# Patient Record
Sex: FEMALE | Race: WHITE | NOT HISPANIC OR LATINO | URBAN - METROPOLITAN AREA
[De-identification: names, ages, dates, MRNs, and addresses within clinical notes are randomized per-mention and may not be internally consistent; named-entity substitution may affect disease eponyms.]

---

## 2017-01-09 ENCOUNTER — APPOINTMENT (OUTPATIENT)
Dept: URBAN - METROPOLITAN AREA CLINIC 200 | Age: 52
Setting detail: DERMATOLOGY
End: 2017-01-14

## 2017-01-09 DIAGNOSIS — L21.8 OTHER SEBORRHEIC DERMATITIS: ICD-10-CM

## 2017-01-09 PROBLEM — D23.5 OTHER BENIGN NEOPLASM OF SKIN OF TRUNK: Status: ACTIVE | Noted: 2017-01-09

## 2017-01-09 PROCEDURE — 99213 OFFICE O/P EST LOW 20 MIN: CPT

## 2017-01-09 PROCEDURE — OTHER PRESCRIPTION: OTHER

## 2017-01-09 PROCEDURE — OTHER COUNSELING: OTHER

## 2017-01-09 RX ORDER — KETOCONAZOLE 20 MG/G
CREAM TOPICAL
Qty: 1 | Refills: 4 | Status: ERX | COMMUNITY
Start: 2017-01-09

## 2017-01-09 ASSESSMENT — LOCATION DETAILED DESCRIPTION DERM: LOCATION DETAILED: RIGHT CENTRAL FRONTAL SCALP

## 2017-01-09 ASSESSMENT — LOCATION SIMPLE DESCRIPTION DERM: LOCATION SIMPLE: RIGHT SCALP

## 2017-01-09 ASSESSMENT — LOCATION ZONE DERM: LOCATION ZONE: SCALP

## 2018-03-05 ENCOUNTER — TRANSCRIBE ORDERS (OUTPATIENT)
Dept: SCHEDULING | Age: 53
End: 2018-03-05

## 2018-03-05 DIAGNOSIS — D25.9 UTERINE LEIOMYOMA, UNSPECIFIED LOCATION: Primary | ICD-10-CM

## 2018-03-09 ENCOUNTER — HOSPITAL ENCOUNTER (OUTPATIENT)
Dept: RADIOLOGY | Facility: HOSPITAL | Age: 53
Discharge: HOME | End: 2018-03-09
Attending: OBSTETRICS & GYNECOLOGY
Payer: COMMERCIAL

## 2018-03-09 DIAGNOSIS — D25.9 UTERINE LEIOMYOMA, UNSPECIFIED LOCATION: ICD-10-CM

## 2018-03-09 PROCEDURE — 76856 US EXAM PELVIC COMPLETE: CPT

## 2018-03-22 RX ORDER — VENLAFAXINE HYDROCHLORIDE 150 MG/1
1 CAPSULE, EXTENDED RELEASE ORAL DAILY
COMMUNITY
End: 2018-03-26 | Stop reason: DRUGHIGH

## 2018-03-22 RX ORDER — BUDESONIDE AND FORMOTEROL FUMARATE DIHYDRATE 160; 4.5 UG/1; UG/1
2 AEROSOL RESPIRATORY (INHALATION) 2 TIMES DAILY
COMMUNITY
End: 2018-03-26 | Stop reason: DRUGHIGH

## 2018-03-22 RX ORDER — MONTELUKAST SODIUM 10 MG/1
1 TABLET ORAL DAILY
COMMUNITY
End: 2018-12-19 | Stop reason: SDUPTHER

## 2018-03-26 ENCOUNTER — OFFICE VISIT (OUTPATIENT)
Dept: GYNECOLOGIC ONCOLOGY | Facility: CLINIC | Age: 53
End: 2018-03-26
Attending: OBSTETRICS & GYNECOLOGY
Payer: COMMERCIAL

## 2018-03-26 VITALS
HEIGHT: 65 IN | DIASTOLIC BLOOD PRESSURE: 92 MMHG | WEIGHT: 162 LBS | SYSTOLIC BLOOD PRESSURE: 143 MMHG | BODY MASS INDEX: 26.99 KG/M2 | HEART RATE: 105 BPM

## 2018-03-26 DIAGNOSIS — D25.2 SUBMUCOUS AND SUBSEROUS LEIOMYOMA OF UTERUS: Primary | ICD-10-CM

## 2018-03-26 DIAGNOSIS — D25.0 SUBMUCOUS AND SUBSEROUS LEIOMYOMA OF UTERUS: Primary | ICD-10-CM

## 2018-03-26 PROBLEM — D25.1 INTRAMURAL AND SUBMUCOUS LEIOMYOMA OF UTERUS: Status: ACTIVE | Noted: 2018-03-26

## 2018-03-26 PROCEDURE — 99204 OFFICE O/P NEW MOD 45 MIN: CPT | Performed by: OBSTETRICS & GYNECOLOGY

## 2018-03-26 RX ORDER — SERTRALINE HYDROCHLORIDE 50 MG/1
25 TABLET, FILM COATED ORAL DAILY
Refills: 2 | COMMUNITY
Start: 2018-01-26 | End: 2019-02-10 | Stop reason: SDUPTHER

## 2018-03-26 RX ORDER — DILTIAZEM HYDROCHLORIDE 60 MG/1
1 TABLET, FILM COATED ORAL DAILY
Refills: 5 | COMMUNITY
Start: 2017-12-29 | End: 2019-01-15 | Stop reason: SDUPTHER

## 2018-03-26 RX ORDER — ALBUTEROL SULFATE 90 UG/1
2 AEROSOL, METERED RESPIRATORY (INHALATION) 4 TIMES DAILY PRN
Refills: 2 | COMMUNITY
Start: 2018-01-24 | End: 2019-09-05 | Stop reason: SDUPTHER

## 2018-03-26 ASSESSMENT — ENCOUNTER SYMPTOMS
NEUROLOGICAL NEGATIVE: 1
EYES NEGATIVE: 1
ALLERGIC/IMMUNOLOGIC COMMENTS: REVIEWED AND UPDATED
PSYCHIATRIC NEGATIVE: 1
RESPIRATORY NEGATIVE: 1
MUSCULOSKELETAL NEGATIVE: 1
CONSTITUTIONAL NEGATIVE: 1
ENDOCRINE NEGATIVE: 1
GASTROINTESTINAL NEGATIVE: 1
CARDIOVASCULAR NEGATIVE: 1

## 2018-03-26 ASSESSMENT — PAIN SCALES - GENERAL: PAINLEVEL: 0-NO PAIN

## 2018-03-26 NOTE — PROGRESS NOTES
3/26/2018    Madonna Tipton is a 52 y.o. female who presents with uterine fibroids. Periods are regular every 21-24 days, lasting 3 days. Dysmenorrhea:mild, occurring first 1-2 days of flow. Cyclic symptoms include bloating and pelvic pain. No intermenstrual bleeding, spotting, or discharge.    Current contraception: Infertility, tubal factor  History of abnormal Pap smear: no  Family history of uterine or ovarian cancer: no  Regular self breast exam: not applicable  History of abnormal mammogram: no  Family history of breast cancer: yes - Mother age 56 yo Mammogram normal May 2018  History of abnormal lipids: no  Menstrual History:  OB History      Para Term  AB Living    2 1     1 1    SAB TAB Ectopic Multiple Live Births                      Menarche age: 14  No LMP recorded. Patient is not currently having periods (Reason: Premenopausal).  Period Cycle (Days): 21  Period Duration (Days): 3  Period Pattern: Regular  Menstrual Flow: Moderate  ULTRASOUND PELVIS W TRANSVAGINAL   Status: Final result   Study Result     CLINICAL HISTORY:  History of fibroids; LMP three weeks ago.     COMMENT: Transabdominal and transvaginal ultrasound of the pelvis is performed.  Transvaginal examination was performed to better evaluate the endometrium and  the ovaries.  There are no prior examinations for comparison.  Transabdominally,  the urinary bladder is normal.  Uterus measures 13.7 cm in length x 5.1 cm in AP  x 8.6 cm in transverse dimension.  The endometrial stripe is homogeneous  measuring 0.9 cm in thickness.  There is no abnormal vascular flow within the  endometrium.  Multiple fibroids are identified.  The first fibroid is seen in  the right aspect of the mid-uterus measuring 5.3 cm x 5.8 cm x 6.2 cm.  Second  fibroid is seen in the left lower uterine segment anteriorly measuring 1.9 cm x  1.7 cm x 2.2 cm.  Third fibroid is seen posterior right aspect of the fundus  measuring 8.6 cm x 6.1 cm x 7.2 cm.   "Right ovary is not seen.  Left ovary  measures 3.4 cm x 4.7 cm x 4.1 cm.  A cyst is visualized within it measuring 3.4  cm x 2.3 cm x 2.8 cm.  No free fluid is noted.     --  IMPRESSION:  1.  Fibroid uterus with normal-appearing endometrial stripe.  2.  Nonvisualization of the right ovary with normal-appearing left ovary.   Result History     ULTRASOUND PELVIS W TRANSVAGINAL (Order #[id number]) on 3/9/2018 - Order Result History Report - Result Edited   Percentile Graphs     No pregnancy episode available   Signed by     Reading Radiologist Date/Time    Tomy Duran MD 3/09/2018 14:55       The following portions of the patient's history were reviewed and updated as appropriate: allergies, current medications, past family history, past medical history, past social history, past surgical history and problem list.    BP (!) 143/92   Pulse (!) 105   Ht 1.651 m (5' 5\")   Wt 73.5 kg (162 lb)   BMI 26.96 kg/m²   HPI  Review of Systems   Constitutional: Negative.    HENT: Negative.    Eyes: Negative.    Respiratory: Negative.    Cardiovascular: Negative.    Gastrointestinal: Negative.    Endocrine: Negative.    Genitourinary:        As per HPI   Musculoskeletal: Negative.    Skin: Negative.    Allergic/Immunologic:        Reviewed and updated   Neurological: Negative.    Psychiatric/Behavioral: Negative.      Physical Exam   Constitutional: She is oriented to person, place, and time. She appears well-developed and well-nourished.   Genitourinary: Vagina normal.   External female genitalia normal.   Urethral meatus normal.   Urethra normal.   Vagina normal.   Cervix exam normal.Uterus is enlarged (18 week size and irregular with low anterior fibroid.). Uterine contour is irregular.   Adnexa normal.   Cardiovascular: Normal rate.    Pulmonary/Chest: Effort normal.   Abdominal: Soft. She exhibits distension. There is no tenderness. There is no guarding.   Neurological: She is alert and oriented to person, place, and time. "   Skin: Skin is warm and dry.   Vitals reviewed.      Assessment/Plan   Symptomatic uterine fibroids.  Abdominal ultrasound..  Return if symptoms worsen or fail to improve, for Prep for Surgery >7 days REquires EMBx.  Ciro Monroe MD

## 2018-03-26 NOTE — PATIENT INSTRUCTIONS
Patient Education   Total Laparoscopic Hysterectomy  A total laparoscopic hysterectomy is a minimally invasive surgery to remove your uterus and cervix. This surgery is performed by making several small cuts (incisions) in your abdomen. It can also be done with a thin, lighted tube (laparoscope) inserted into two small incisions in your lower abdomen. Your fallopian tubes and ovaries can be removed (bilateral salpingo-oophorectomy) during this surgery as well. Benefits of minimally invasive surgery include:  · Less pain.  · Less risk of blood loss.  · Less risk of infection.  · Quicker return to normal activities.  Tell a health care provider about:  · Any allergies you have.  · All medicines you are taking, including vitamins, herbs, eye drops, creams, and over-the-counter medicines.  · Any problems you or family members have had with anesthetic medicines.  · Any blood disorders you have.  · Any surgeries you have had.  · Any medical conditions you have.  What are the risks?  Generally, this is a safe procedure. However, as with any procedure, complications can occur. Possible complications include:  · Bleeding.  · Blood clots in the legs or lung.  · Infection.  · Injury to surrounding organs.  · Problems with anesthesia.  · Early menopause symptoms (hot flashes, night sweats, insomnia).  · Risk of conversion to an open abdominal incision.  What happens before the procedure?  · Ask your health care provider about changing or stopping your regular medicines.  · Do not take aspirin or blood thinners (anticoagulants) for 1 week before the surgery or as told by your health care provider.  · Do not eat or drink anything for 8 hours before the surgery or as told by your health care provider.  · Quit smoking if you smoke.  · Arrange for a ride home after surgery and for someone to help you at home during recovery.  What happens during the procedure?  · You will be given antibiotic medicine.  · An IV tube will be placed in  your arm. You will be given medicine to make you sleep (general anesthetic).  · A gas (carbon dioxide) will be used to inflate your abdomen. This will allow your surgeon to look inside your abdomen, perform your surgery, and treat any other problems found if necessary.  · Three or four small incisions (often less than 1/2 inch) will be made in your abdomen. One of these incisions will be made in the area of your belly button (navel). The laparoscope will be inserted into the incision. Your surgeon will look through the laparoscope while doing your procedure.  · Other surgical instruments will be inserted through the other incisions.  · Your uterus may be removed through your vagina or cut into small pieces and removed through the small incisions.  · Your incisions will be closed.  What happens after the procedure?  · The gas will be released from inside your abdomen.  · You will be taken to the recovery area where a nurse will watch and check your progress. Once you are awake, stable, and taking fluids well, without other problems, you will return to your room or be allowed to go home.  · There is usually minimal discomfort following the surgery because the incisions are so small.  · You will be given pain medicine while you are in the hospital and for when you go home.  This information is not intended to replace advice given to you by your health care provider. Make sure you discuss any questions you have with your health care provider.  Document Released: 10/14/2008 Document Revised: 05/25/2017 Document Reviewed: 07/08/2014  [employer] Interactive Patient Education © 2017 [employer]

## 2018-04-02 PROBLEM — D25.2 SUBMUCOUS AND SUBSEROUS LEIOMYOMA OF UTERUS: Status: ACTIVE | Noted: 2018-04-02

## 2018-04-02 PROBLEM — D25.0 SUBMUCOUS AND SUBSEROUS LEIOMYOMA OF UTERUS: Status: ACTIVE | Noted: 2018-04-02

## 2018-05-07 ENCOUNTER — HOSPITAL ENCOUNTER (OUTPATIENT)
Dept: RADIOLOGY | Age: 53
Discharge: HOME | End: 2018-05-07
Attending: OBSTETRICS & GYNECOLOGY
Payer: COMMERCIAL

## 2018-05-07 DIAGNOSIS — Z12.31 ENCOUNTER FOR SCREENING MAMMOGRAM FOR MALIGNANT NEOPLASM OF BREAST: ICD-10-CM

## 2018-05-07 PROCEDURE — 77067 SCR MAMMO BI INCL CAD: CPT

## 2018-05-21 ENCOUNTER — OFFICE VISIT (OUTPATIENT)
Dept: SURGERY | Facility: CLINIC | Age: 53
End: 2018-05-21
Attending: SURGERY
Payer: COMMERCIAL

## 2018-05-21 VITALS
WEIGHT: 166.2 LBS | SYSTOLIC BLOOD PRESSURE: 126 MMHG | BODY MASS INDEX: 27.66 KG/M2 | TEMPERATURE: 98.4 F | DIASTOLIC BLOOD PRESSURE: 76 MMHG

## 2018-05-21 DIAGNOSIS — Z91.89 AT HIGH RISK FOR BREAST CANCER: Primary | ICD-10-CM

## 2018-05-21 PROCEDURE — 99213 OFFICE O/P EST LOW 20 MIN: CPT | Performed by: SURGERY

## 2018-05-21 NOTE — LETTER
May 23, 2018     Brenda Agustin MD  [address]  [address]  [place] [facility] [id number]    Patient: Madonna Tipton   YOB: 1965   Date of Visit: 2018       Dear Dr. Agustin:    Thank you for referring Madonna Tipton to me for evaluation. Below are my notes for this consultation.    If you have questions, please do not hesitate to call me. I look forward to following your patient along with you.         Sincerely,        Benja Herring MD        CC: Yardlie Toussaint-Foster, DO William Bradford Carter, MD  2018 10:02 AM  Signed  Patient ID: Madonna Tipton                              : 1965    Visit Date: 2018  Referring Provider: Brenda Agustin MD  PCP: Brenda Agustin MD  GYN: No care team member to display    Subjective   Chief Complaint: Breast Check (Follow up on Mammo and US)    Madonna Tipton is a 52 y.o. old female presenting today for follow-up for high risk for breast cancer.  Madonna has high risk for breast cancer with a [facility] lifetime risk of 24%.  She then followed with serial MRIs in , 2015, and 2016.  She is noted no change to her breast since her last exam.  She did have mammogram performed in May which I chance review.  The showed no architectural distortion masses or calcifications of concern for malignancy.       Review of Systems   Constitutional: Negative for fatigue and unexpected weight change.   HENT: Negative for trouble swallowing and voice change.    Eyes: Negative for photophobia and visual disturbance.   Respiratory: Negative for chest tightness, shortness of breath, wheezing and stridor.    Cardiovascular: Negative for chest pain, palpitations and leg swelling.   Gastrointestinal: Negative for abdominal pain and blood in stool.   Endocrine: Negative for cold intolerance, heat intolerance and polyuria.   Genitourinary: Negative for flank pain, frequency and hematuria.   Musculoskeletal: Negative for arthralgias, back  pain, joint swelling and myalgias.   Skin: Negative for color change and rash.   Neurological: Negative for syncope, weakness and headaches.   Hematological: Negative for adenopathy. Does not bruise/bleed easily.   Psychiatric/Behavioral: Negative for agitation, confusion and decreased concentration. The patient is not nervous/anxious.          Family History: family history includes Breast cancer in her mother; Colon cancer in her maternal grandfather.  Allergies: is allergic to feathers; mold; and shellfish derived.    Objective   Vitals: /76 (BP Location: Right forearm, Patient Position: Sitting)   Temp 36.9 °C (98.4 °F) (Temporal)   Wt 75.4 kg (166 lb 3.2 oz)   BMI 27.66 kg/m²    Physical Exam   Pulmonary/Chest:   There is no supraclavicular or cervical adenopathy.  Extra adenopathy is absent.  There is no scleral icterus.  The breasts are symmetrical. There is no dominant mass in either breast.There is no erythema or nipple discharge. There is no clinical suspicion for malignancy.            Assessment/Plan   Problem List Items Addressed This Visit     At high risk for breast cancer - Primary    Relevant Orders    BI SCREENING MAMMOGRAM BILATERAL        She appears without clinical evidence of disease at this time.  We have recommended bilateral mammogram in 1 year with clinical exam.  She is due for an MRI in 6 months.  Return in about 1 year (around 5/21/2019).       Benja Herring MD

## 2018-05-23 DIAGNOSIS — Z91.89 AT HIGH RISK FOR BREAST CANCER: Primary | ICD-10-CM

## 2018-05-23 ASSESSMENT — ENCOUNTER SYMPTOMS
MYALGIAS: 0
HEMATURIA: 0
BRUISES/BLEEDS EASILY: 0
HEADACHES: 0
STRIDOR: 0
ABDOMINAL PAIN: 0
SHORTNESS OF BREATH: 0
CHEST TIGHTNESS: 0
ADENOPATHY: 0
UNEXPECTED WEIGHT CHANGE: 0
VOICE CHANGE: 0
JOINT SWELLING: 0
AGITATION: 0
BLOOD IN STOOL: 0
FATIGUE: 0
CONFUSION: 0
FREQUENCY: 0
NERVOUS/ANXIOUS: 0
ARTHRALGIAS: 0
COLOR CHANGE: 0
DECREASED CONCENTRATION: 0
PALPITATIONS: 0
WHEEZING: 0
PHOTOPHOBIA: 0
TROUBLE SWALLOWING: 0
FLANK PAIN: 0
WEAKNESS: 0
BACK PAIN: 0

## 2018-05-30 ENCOUNTER — TELEPHONE (OUTPATIENT)
Dept: SURGERY | Facility: CLINIC | Age: 53
End: 2018-05-30

## 2018-05-30 NOTE — TELEPHONE ENCOUNTER
LEFT MESSAGE ([phone]) REGARDING BREAST MRI AUTH# [id number], EFFECTIVE DATES, 5/30/18 TO 7/28/18, SCHEDULING PHONE # [phone] AND TO CALL ME BACK AS I CAN SCHEDULE IT FOR HER NO PROBLEM.

## 2018-05-31 ENCOUNTER — TELEPHONE (OUTPATIENT)
Dept: SURGERY | Facility: CLINIC | Age: 53
End: 2018-05-31

## 2018-06-04 ENCOUNTER — PREP FOR CASE (OUTPATIENT)
Dept: GYNECOLOGIC ONCOLOGY | Facility: CLINIC | Age: 53
End: 2018-06-04

## 2018-06-04 ENCOUNTER — CONSULT (OUTPATIENT)
Dept: GYNECOLOGIC ONCOLOGY | Facility: CLINIC | Age: 53
End: 2018-06-04
Attending: OBSTETRICS & GYNECOLOGY
Payer: COMMERCIAL

## 2018-06-04 ENCOUNTER — APPOINTMENT (OUTPATIENT)
Dept: PREADMISSION TESTING | Facility: HOSPITAL | Age: 53
End: 2018-06-04
Attending: OBSTETRICS & GYNECOLOGY
Payer: COMMERCIAL

## 2018-06-04 VITALS
HEART RATE: 93 BPM | DIASTOLIC BLOOD PRESSURE: 81 MMHG | HEIGHT: 65 IN | BODY MASS INDEX: 27.32 KG/M2 | SYSTOLIC BLOOD PRESSURE: 158 MMHG | TEMPERATURE: 98.8 F | WEIGHT: 164 LBS | RESPIRATION RATE: 18 BRPM

## 2018-06-04 VITALS
BODY MASS INDEX: 27.29 KG/M2 | WEIGHT: 163.8 LBS | SYSTOLIC BLOOD PRESSURE: 163 MMHG | HEIGHT: 65 IN | RESPIRATION RATE: 16 BRPM | DIASTOLIC BLOOD PRESSURE: 84 MMHG | HEART RATE: 96 BPM

## 2018-06-04 DIAGNOSIS — D25.2 INTRAMURAL, SUBMUCOUS, AND SUBSEROUS LEIOMYOMA OF UTERUS: Primary | ICD-10-CM

## 2018-06-04 DIAGNOSIS — D25.1 INTRAMURAL AND SUBMUCOUS LEIOMYOMA OF UTERUS: Primary | ICD-10-CM

## 2018-06-04 DIAGNOSIS — D25.0 INTRAMURAL AND SUBMUCOUS LEIOMYOMA OF UTERUS: Primary | ICD-10-CM

## 2018-06-04 DIAGNOSIS — D25.0 INTRAMURAL, SUBMUCOUS, AND SUBSEROUS LEIOMYOMA OF UTERUS: Primary | ICD-10-CM

## 2018-06-04 DIAGNOSIS — D25.1 INTRAMURAL, SUBMUCOUS, AND SUBSEROUS LEIOMYOMA OF UTERUS: Primary | ICD-10-CM

## 2018-06-04 DIAGNOSIS — Z01.818 PREOP EXAMINATION: Primary | ICD-10-CM

## 2018-06-04 LAB
ABO + RH BLD: NORMAL
ANION GAP SERPL CALC-SCNC: 10 MEQ/L (ref 3–15)
ATRIAL RATE: 92
BLD GP AB SCN SERPL QL: NEGATIVE
BLOOD BANK CMNT PATIENT-IMP: NORMAL
BUN SERPL-MCNC: 14 MG/DL (ref 8–20)
CALCIUM SERPL-MCNC: 9.9 MG/DL (ref 8.9–10.3)
CHLORIDE SERPL-SCNC: 100 MMOL/L (ref 98–109)
CO2 SERPL-SCNC: 25 MMOL/L (ref 22–32)
CREAT SERPL-MCNC: 0.7 MG/DL (ref 0.6–1.1)
D AG BLD QL: NEGATIVE
ERYTHROCYTE [DISTWIDTH] IN BLOOD BY AUTOMATED COUNT: 12.5 % (ref 11.7–14.4)
GFR SERPL CREATININE-BSD FRML MDRD: >60 ML/MIN/1.73M*2
GLUCOSE SERPL-MCNC: 112 MG/DL (ref 70–99)
HCG UR QL: NEGATIVE
HCT VFR BLDCO AUTO: 43.7 % (ref 35–45)
HGB BLD-MCNC: 14.6 G/DL (ref 11.8–15.7)
LABORATORY COMMENT REPORT: NORMAL
MCH RBC QN AUTO: 31.9 PG (ref 28–33.2)
MCHC RBC AUTO-ENTMCNC: 33.4 G/DL (ref 32.2–35.5)
MCV RBC AUTO: 95.6 FL (ref 83–98)
P AXIS: 46
PDW BLD AUTO: 9.7 FL (ref 9.4–12.3)
PLATELET # BLD AUTO: 272 K/UL (ref 150–369)
POTASSIUM SERPL-SCNC: 4.2 MMOL/L (ref 3.6–5.1)
PR INTERVAL: 136
QRS DURATION: 94
QT INTERVAL: 378
QTC CALCULATION(BAZETT): 467
R AXIS: 24
RBC # BLD AUTO: 4.57 M/UL (ref 3.93–5.22)
SODIUM SERPL-SCNC: 135 MMOL/L (ref 136–144)
T WAVE AXIS: 42
VENTRICULAR RATE: 92
WBC # BLD AUTO: 5.13 K/UL (ref 3.8–10.5)

## 2018-06-04 PROCEDURE — 80048 BASIC METABOLIC PNL TOTAL CA: CPT

## 2018-06-04 PROCEDURE — 84703 CHORIONIC GONADOTROPIN ASSAY: CPT

## 2018-06-04 PROCEDURE — 85027 COMPLETE CBC AUTOMATED: CPT

## 2018-06-04 PROCEDURE — 93005 ELECTROCARDIOGRAM TRACING: CPT

## 2018-06-04 PROCEDURE — 99213 OFFICE O/P EST LOW 20 MIN: CPT | Mod: 25 | Performed by: OBSTETRICS & GYNECOLOGY

## 2018-06-04 PROCEDURE — 36415 COLL VENOUS BLD VENIPUNCTURE: CPT

## 2018-06-04 PROCEDURE — 88305 TISSUE EXAM BY PATHOLOGIST: CPT | Performed by: OBSTETRICS & GYNECOLOGY

## 2018-06-04 PROCEDURE — 58100 BIOPSY OF UTERUS LINING: CPT | Performed by: OBSTETRICS & GYNECOLOGY

## 2018-06-04 PROCEDURE — 86900 BLOOD TYPING SEROLOGIC ABO: CPT

## 2018-06-04 PROCEDURE — 93010 ELECTROCARDIOGRAM REPORT: CPT | Performed by: INTERNAL MEDICINE

## 2018-06-04 RX ORDER — ACETAMINOPHEN 325 MG/1
975 TABLET ORAL ONCE
Status: CANCELLED | OUTPATIENT
Start: 2018-06-04 | End: 2018-06-04

## 2018-06-04 RX ORDER — SODIUM CHLORIDE, SODIUM LACTATE, POTASSIUM CHLORIDE, CALCIUM CHLORIDE 600; 310; 30; 20 MG/100ML; MG/100ML; MG/100ML; MG/100ML
INJECTION, SOLUTION INTRAVENOUS CONTINUOUS
Status: CANCELLED | OUTPATIENT
Start: 2018-06-04 | End: 2018-06-05

## 2018-06-04 RX ORDER — CELECOXIB 100 MG/1
400 CAPSULE ORAL ONCE
Status: CANCELLED | OUTPATIENT
Start: 2018-06-04 | End: 2018-06-04

## 2018-06-04 RX ORDER — GABAPENTIN 100 MG/1
600 CAPSULE ORAL ONCE
Status: CANCELLED | OUTPATIENT
Start: 2018-06-04 | End: 2018-06-04

## 2018-06-04 ASSESSMENT — ENCOUNTER SYMPTOMS
ALLERGIC/IMMUNOLOGIC COMMENTS: REVIEWED AND UPDATED
MUSCULOSKELETAL NEGATIVE: 1
GASTROINTESTINAL NEGATIVE: 1
RESPIRATORY NEGATIVE: 1
PSYCHIATRIC NEGATIVE: 1
ENDOCRINE NEGATIVE: 1
CARDIOVASCULAR NEGATIVE: 1
EYES NEGATIVE: 1
NEUROLOGICAL NEGATIVE: 1
CONSTITUTIONAL NEGATIVE: 1

## 2018-06-04 ASSESSMENT — PAIN SCALES - GENERAL: PAINLEVEL: 0-NO PAIN

## 2018-06-04 NOTE — PROGRESS NOTES
2018    Madonna Tipton is a 53 y.o. LMP 31 May 2018 female who presents with uterine fibroids. Periods are regular every 21-24 days, lasting 3 days. Dysmenorrhea:mild, occurring first 1-2 days of flow. Cyclic symptoms include bloating and pelvic pain. No intermenstrual bleeding, spotting, or discharge. Patient reports no change in flow or symptoms and presents for preoperative exam for hysterectomy.    Current contraception: Infertility, tubal factor  History of abnormal Pap smear: no  Family history of uterine or ovarian cancer: no  Regular self breast exam: not applicable  History of abnormal mammogram: no  Family history of breast cancer: yes - Mother age 56 yo Mammogram normal May 2018  History of abnormal lipids: no  Menstrual History:  OB History      Para Term  AB Living    2 1     1 1    SAB TAB Ectopic Multiple Live Births                      Menarche age: 14  Patient's last menstrual period was 2018.     ULTRASOUND PELVIS W TRANSVAGINAL   Status: Final result   Study Result     CLINICAL HISTORY:  History of fibroids; LMP three weeks ago.     COMMENT: Transabdominal and transvaginal ultrasound of the pelvis is performed.  Transvaginal examination was performed to better evaluate the endometrium and  the ovaries.  There are no prior examinations for comparison.  Transabdominally,  the urinary bladder is normal.  Uterus measures 13.7 cm in length x 5.1 cm in AP  x 8.6 cm in transverse dimension.  The endometrial stripe is homogeneous  measuring 0.9 cm in thickness.  There is no abnormal vascular flow within the  endometrium.  Multiple fibroids are identified.  The first fibroid is seen in  the right aspect of the mid-uterus measuring 5.3 cm x 5.8 cm x 6.2 cm.  Second  fibroid is seen in the left lower uterine segment anteriorly measuring 1.9 cm x  1.7 cm x 2.2 cm.  Third fibroid is seen posterior right aspect of the fundus  measuring 8.6 cm x 6.1 cm x 7.2 cm.  Right ovary is not  "seen.  Left ovary  measures 3.4 cm x 4.7 cm x 4.1 cm.  A cyst is visualized within it measuring 3.4  cm x 2.3 cm x 2.8 cm.  No free fluid is noted.     --  IMPRESSION:  1.  Fibroid uterus with normal-appearing endometrial stripe.  2.  Nonvisualization of the right ovary with normal-appearing left ovary.   Result History     ULTRASOUND PELVIS W TRANSVAGINAL (Order #[id number]) on 3/9/2018 - Order Result History Report - Result Edited   Percentile Graphs     No pregnancy episode available   Signed by     Reading Radiologist Date/Time    Tomy Duran MD 3/09/2018 14:55       The following portions of the patient's history were reviewed and updated as appropriate: allergies, current medications, past family history, past medical history, past social history, past surgical history and problem list.    BP (!) 163/84 (BP Location: Left upper arm, Patient Position: Sitting)   Pulse 96   Resp 16   Ht 1.651 m (5' 5\")   Wt 74.3 kg (163 lb 12.8 oz)   LMP 05/31/2018   Breastfeeding? No   BMI 27.26 kg/m²   HPI  Review of Systems   Constitutional: Negative.    HENT: Negative.    Eyes: Negative.    Respiratory: Negative.    Cardiovascular: Negative.    Gastrointestinal: Negative.    Endocrine: Negative.    Genitourinary:        As per HPI   Musculoskeletal: Negative.    Skin: Negative.    Allergic/Immunologic:        Reviewed and updated   Neurological: Negative.    Psychiatric/Behavioral: Negative.      Physical Exam   Constitutional: She is oriented to person, place, and time. She appears well-developed and well-nourished.   Genitourinary: Vagina normal.   External female genitalia normal.   Urethral meatus normal.   Urethra normal.   Vagina normal.   Cervix exam normal.Uterus is enlarged (18 week size and irregular with low anterior fibroid.). Uterine contour is irregular.   Adnexa normal.   Cardiovascular: Normal rate.    Pulmonary/Chest: Effort normal.   Abdominal: Soft. She exhibits distension. There is no tenderness. " There is no guarding.   Neurological: She is alert and oriented to person, place, and time.   Skin: Skin is warm and dry.   Vitals reviewed.      Assessment/Plan   Symptomatic uterine fibroids.  Desires total hysterectomy bilateral salpingectomy cystoscopy  with ovarian conservation.  Risk and beneifts of procedure as well as alternatives discussed.  EMB done.    Counseling    The patient was counseled for  total laparoscopic hysterectomy, bilateral salpingectomy, cystoscopy and possible open abdominal surgery.  She was counseled for the risk of surgery to include bleeding infection or allergies to medication or anesthesia.  She was further informed of the risk of hysterectomy including risk of injury to adjacent organs including but not limited to bowel, bladder, ureters, major blood vessels and nerves.  Patient is aware of injury identified intraoperatively would be repaired at the time of surgery although there is the risk for additional surgery, prolonged surgical time or hospitalization related to unanticipated complication.  The patient is aware that the inability to complete procedure safely laparoscopically may require an open abdominal surgery which may further prolonged hospital stay and recovery time.  She was counseled of the risk for excessive operative blood loss requiring transfusion of blood products and complications of wound healing including wound separation and infection.  Patient verbalized understanding of the associated operative risk. Additional questions including sexual function, vaginal length and risk benefits of ovarian conservation were addressed.  Procedure: The patient was counseled on the necessity of endometrial biopsy to rule out pathology. She was advised of the risks and agreed to proceed.    The cervix was visualized with a speculum and prepped with betadine. The anterior lip was steadied with a single-tooth tenaculum. The os was dilated with plastic dilator and the uterus  sounded to 10 cm. A moderate amount of tissue with blood was obtained. The tenaculum site was hemostatic. The patient tolerated the procedure well.          Return in about 4 weeks (around 7/2/2018) for Post Op Visit after 6/28 Surgery.  Ciro Monroe MD

## 2018-06-04 NOTE — PATIENT INSTRUCTIONS
Patient Education   Total Laparoscopic Hysterectomy, Care After  Refer to this sheet in the next few weeks. These instructions provide you with information on caring for yourself after your procedure. Your health care provider may also give you more specific instructions. Your treatment has been planned according to current medical practices, but problems sometimes occur. Call your health care provider if you have any problems or questions after your procedure.  What can I expect after the procedure?  · Pain and bruising at the incision sites. You will be given pain medicine to control it.  · Menopausal symptoms such as hot flashes, night sweats, and insomnia if your ovaries were removed.  · Sore throat from the breathing tube that was inserted during surgery.  Follow these instructions at home:  · Only take over-the-counter or prescription medicines for pain, discomfort, or fever as directed by your health care provider.  · Do not take aspirin. It can cause bleeding.  · Do not drive when taking pain medicine.  · Follow your health care provider's advice regarding diet, exercise, lifting, driving, and general activities.  · Resume your usual diet as directed and allowed.  · Get plenty of rest and sleep.  · Do not douche, use tampons, or have sexual intercourse for at least 6 weeks, or until your health care provider gives you permission.  · Change your bandages (dressings) as directed by your health care provider.  · Monitor your temperature and notify your health care provider of a fever.  · Take showers instead of baths for 2-3 weeks.  · Do not drink alcohol until your health care provider gives you permission.  · If you develop constipation, you may take a mild laxative with your health care provider's permission. Bran foods may help with constipation problems. Drinking enough fluids to keep your urine clear or pale yellow may help as well.  · Try to have someone home with you for 1-2 weeks to help around the  house.  · Keep all of your follow-up appointments as directed by your health care provider.  · Activity  Walkling at your pace as tolerated, No heavy lifting or abdominal Exercises  Contact a health care provider if:  · You have swelling, redness, or increasing pain around your incision sites.  · You have pus coming from your incision.  · You notice a bad smell coming from your incision.  · Your incision breaks open.  · You feel dizzy or lightheaded.  · You have pain or bleeding when you urinate.  · You have persistent diarrhea.  · You have persistent nausea and vomiting.  · You have abnormal vaginal discharge.  · You have a rash.  · You have any type of abnormal reaction or develop an allergy to your medicine.  · You have poor pain control with your prescribed medicine.  Get help right away if:  · You have chest pain or shortness of breath.  · You have severe abdominal pain that is not relieved with pain medicine.  · You have pain or swelling in your legs.  This information is not intended to replace advice given to you by your health care provider. Make sure you discuss any questions you have with your health care provider.  Document Released: 10/08/2014 Document Revised: 05/25/2017 Document Reviewed: 07/08/2014  [employer] Interactive Patient Education © 2017 [employer]     POST-OP PAIN CONTROL  *TYLENOL 650mg by mouth every 6 hours  *IBUPROFEN 600MG BY MOUTH EVERY 6 HOURS  *COLACE 100MG by mouth 2 times a day   TRAMADOL 50 MG BY MOUTH AS NEEDED FOR PAIN

## 2018-06-04 NOTE — PRE-PROCEDURE INSTRUCTIONS
1. We will call you between 3 pm and 7 pm on June 27, 2018 to determine that arrival time for your procedure. If you do not hear by 6PM. Please call [phone] for arrival time.    2. Please report to [facility] [place] [facility] [place], walk into [facility] and report to the admission desk on first floor on the day of your procedure.   3. Please follow the following fasting guidelines:   Nothing to eat or drink after midnight unless otherwise instructed by  your physician.    4. Early on the morning of the procedure please take your usual dose of the listed medications with a sip of water:    Bring your inhaler on day of surgery.    AVOID ASPIRIN, ANTI-INFLAMMATORY MEDICATION 1 WEEK PRIOR TO SURGERY.     5. Other Instructions:    6. If you develop a cold, cough, fever, rash, or other symptom prior to the data of the procedure, please report it to your physician immediately.   7. If you need to cancel the procedure for any reason, please contact your physician or call the unit listed above.   8. Make arrangements to have someone drive you home from the procedure. If you have not arranged for transportation home, your surgery may be cancelled.    9. You may not take public transportation unless accompanied by a responsible person.   10. You may not drive a car or operate complex or potentially dangerous machinery for 24 hours following anesthesia and/or sedation.   11. If it is medically necessary for you to have a longer stay, you will be informed as soon as the decision is made.   12. Do not wear or bring anything of value to the hospital including jewelry of any kind. Do not wear make-up or contact lenses. DO bring your glasses and hearing aid.   13. No lotion, creams, powders, or oils on skin the morning of procedure    14. Dress in comfortable clothes.   15.  If instructed, please bring a copy of your Advanced Directive (Living Will/Durable Power of ) on the day of your procedure.      Pre operative instructions  given as per protocol.  Form explained by: EMMA Petty     I have read and understand the above information. I have had sufficient opportunity to ask questions I might have and they have been answered to my satisfaction. I agree to comply with the Patient Responsibilities listed above and have received a copy of this form.

## 2018-06-06 LAB
CASE RPRT: NORMAL
PATH REPORT.FINAL DX SPEC: NORMAL
PATH REPORT.GROSS SPEC: NORMAL

## 2018-06-28 ENCOUNTER — HOSPITAL ENCOUNTER (OUTPATIENT)
Facility: HOSPITAL | Age: 53
Discharge: HOME | End: 2018-06-29
Attending: OBSTETRICS & GYNECOLOGY | Admitting: OBSTETRICS & GYNECOLOGY
Payer: COMMERCIAL

## 2018-06-28 ENCOUNTER — ANESTHESIA (OUTPATIENT)
Dept: OPERATING ROOM | Facility: HOSPITAL | Age: 53
Setting detail: OBSERVATION
End: 2018-06-28
Payer: COMMERCIAL

## 2018-06-28 DIAGNOSIS — D25.1 INTRAMURAL, SUBMUCOUS, AND SUBSEROUS LEIOMYOMA OF UTERUS: ICD-10-CM

## 2018-06-28 DIAGNOSIS — D25.0 SUBMUCOUS AND SUBSEROUS LEIOMYOMA OF UTERUS: ICD-10-CM

## 2018-06-28 DIAGNOSIS — D25.2 SUBMUCOUS AND SUBSEROUS LEIOMYOMA OF UTERUS: ICD-10-CM

## 2018-06-28 DIAGNOSIS — D25.0 INTRAMURAL, SUBMUCOUS, AND SUBSEROUS LEIOMYOMA OF UTERUS: ICD-10-CM

## 2018-06-28 DIAGNOSIS — D25.2 INTRAMURAL, SUBMUCOUS, AND SUBSEROUS LEIOMYOMA OF UTERUS: ICD-10-CM

## 2018-06-28 LAB
B-HCG UR QL: NEGATIVE
ERYTHROCYTE [DISTWIDTH] IN BLOOD BY AUTOMATED COUNT: 12.7 % (ref 11.7–14.4)
ERYTHROCYTE [DISTWIDTH] IN BLOOD BY AUTOMATED COUNT: 13.2 % (ref 11.7–14.4)
HCT VFR BLDCO AUTO: 28.6 % (ref 35–45)
HCT VFR BLDCO AUTO: 31.3 % (ref 35–45)
HGB BLD-MCNC: 10.8 G/DL (ref 11.8–15.7)
HGB BLD-MCNC: 9.5 G/DL (ref 11.8–15.7)
MCH RBC QN AUTO: 32.3 PG (ref 28–33.2)
MCH RBC QN AUTO: 32.7 PG (ref 28–33.2)
MCHC RBC AUTO-ENTMCNC: 33.2 G/DL (ref 32.2–35.5)
MCHC RBC AUTO-ENTMCNC: 34.5 G/DL (ref 32.2–35.5)
MCV RBC AUTO: 94.8 FL (ref 83–98)
MCV RBC AUTO: 97.3 FL (ref 83–98)
PDW BLD AUTO: 9 FL (ref 9.4–12.3)
PDW BLD AUTO: 9.6 FL (ref 9.4–12.3)
PLATELET # BLD AUTO: 206 K/UL (ref 150–369)
PLATELET # BLD AUTO: 209 K/UL (ref 150–369)
RBC # BLD AUTO: 2.94 M/UL (ref 3.93–5.22)
RBC # BLD AUTO: 3.3 M/UL (ref 3.93–5.22)
WBC # BLD AUTO: 13.97 K/UL (ref 3.8–10.5)
WBC # BLD AUTO: 15.94 K/UL (ref 3.8–10.5)

## 2018-06-28 PROCEDURE — 36000004 HC OR LEVEL 4 INITIAL 30MIN: Performed by: OBSTETRICS & GYNECOLOGY

## 2018-06-28 PROCEDURE — 63700000 HC SELF-ADMINISTRABLE DRUG: Performed by: OBSTETRICS & GYNECOLOGY

## 2018-06-28 PROCEDURE — G0378 HOSPITAL OBSERVATION PER HR: HCPCS

## 2018-06-28 PROCEDURE — 36415 COLL VENOUS BLD VENIPUNCTURE: CPT | Performed by: OBSTETRICS & GYNECOLOGY

## 2018-06-28 PROCEDURE — 25000000 HC PHARMACY GENERAL: Performed by: OBSTETRICS & GYNECOLOGY

## 2018-06-28 PROCEDURE — 85027 COMPLETE CBC AUTOMATED: CPT | Performed by: OBSTETRICS & GYNECOLOGY

## 2018-06-28 PROCEDURE — P9016 RBC LEUKOCYTES REDUCED: HCPCS

## 2018-06-28 PROCEDURE — 25800000 HC PHARMACY IV SOLUTIONS: Performed by: NURSE ANESTHETIST, CERTIFIED REGISTERED

## 2018-06-28 PROCEDURE — 36000014 HC OR LEVEL 4 EA ADDL MIN: Performed by: OBSTETRICS & GYNECOLOGY

## 2018-06-28 PROCEDURE — 25000000 HC PHARMACY GENERAL: Performed by: NURSE ANESTHETIST, CERTIFIED REGISTERED

## 2018-06-28 PROCEDURE — 63600000 HC DRUGS/DETAIL CODE: Performed by: NURSE ANESTHETIST, CERTIFIED REGISTERED

## 2018-06-28 PROCEDURE — 36430 TRANSFUSION BLD/BLD COMPNT: CPT | Performed by: ANESTHESIOLOGY

## 2018-06-28 PROCEDURE — 27200000 HC STERILE SUPPLY: Performed by: OBSTETRICS & GYNECOLOGY

## 2018-06-28 PROCEDURE — P9045 ALBUMIN (HUMAN), 5%, 250 ML: HCPCS | Performed by: NURSE ANESTHETIST, CERTIFIED REGISTERED

## 2018-06-28 PROCEDURE — 58573 TLH W/T/O UTERUS OVER 250 G: CPT | Performed by: OBSTETRICS & GYNECOLOGY

## 2018-06-28 PROCEDURE — 63600000 HC DRUGS/DETAIL CODE: Performed by: OBSTETRICS & GYNECOLOGY

## 2018-06-28 PROCEDURE — 37000001 HC ANESTHESIA GENERAL: Performed by: OBSTETRICS & GYNECOLOGY

## 2018-06-28 PROCEDURE — 25800000 HC PHARMACY IV SOLUTIONS: Performed by: OBSTETRICS & GYNECOLOGY

## 2018-06-28 PROCEDURE — 71000001 HC PACU PHASE 1 INITIAL 30MIN: Performed by: OBSTETRICS & GYNECOLOGY

## 2018-06-28 PROCEDURE — 0UT74ZZ RESECTION OF BILATERAL FALLOPIAN TUBES, PERCUTANEOUS ENDOSCOPIC APPROACH: ICD-10-PCS | Performed by: OBSTETRICS & GYNECOLOGY

## 2018-06-28 PROCEDURE — 71000011 HC PACU PHASE 1 EA ADDL MIN: Performed by: OBSTETRICS & GYNECOLOGY

## 2018-06-28 PROCEDURE — 30233N1 TRANSFUSION OF NONAUTOLOGOUS RED BLOOD CELLS INTO PERIPHERAL VEIN, PERCUTANEOUS APPROACH: ICD-10-PCS | Performed by: OBSTETRICS & GYNECOLOGY

## 2018-06-28 PROCEDURE — 0UT94ZZ RESECTION OF UTERUS, PERCUTANEOUS ENDOSCOPIC APPROACH: ICD-10-PCS | Performed by: OBSTETRICS & GYNECOLOGY

## 2018-06-28 PROCEDURE — 63600000 HC DRUGS/DETAIL CODE: Mod: JW | Performed by: OBSTETRICS & GYNECOLOGY

## 2018-06-28 PROCEDURE — 88307 TISSUE EXAM BY PATHOLOGIST: CPT | Performed by: OBSTETRICS & GYNECOLOGY

## 2018-06-28 RX ORDER — ONDANSETRON 4 MG/1
4 TABLET, ORALLY DISINTEGRATING ORAL EVERY 8 HOURS PRN
Status: DISCONTINUED | OUTPATIENT
Start: 2018-06-28 | End: 2018-06-29

## 2018-06-28 RX ORDER — CEFAZOLIN SODIUM/WATER 1 G/10 ML
2 SYRINGE (ML) INTRAVENOUS
Status: COMPLETED | OUTPATIENT
Start: 2018-06-28 | End: 2018-06-28

## 2018-06-28 RX ORDER — ALBUMIN HUMAN 50 G/1000ML
SOLUTION INTRAVENOUS AS NEEDED
Status: DISCONTINUED | OUTPATIENT
Start: 2018-06-28 | End: 2018-06-28 | Stop reason: SURG

## 2018-06-28 RX ORDER — CELECOXIB 200 MG/1
400 CAPSULE ORAL ONCE
Status: COMPLETED | OUTPATIENT
Start: 2018-06-28 | End: 2018-06-28

## 2018-06-28 RX ORDER — GABAPENTIN 300 MG/1
600 CAPSULE ORAL ONCE
Status: COMPLETED | OUTPATIENT
Start: 2018-06-28 | End: 2018-06-28

## 2018-06-28 RX ORDER — ALBUTEROL SULFATE 0.83 MG/ML
2.5 SOLUTION RESPIRATORY (INHALATION) EVERY 6 HOURS PRN
Status: DISCONTINUED | OUTPATIENT
Start: 2018-06-28 | End: 2018-06-29

## 2018-06-28 RX ORDER — ONDANSETRON HYDROCHLORIDE 2 MG/ML
4 INJECTION, SOLUTION INTRAVENOUS EVERY 8 HOURS PRN
Status: DISCONTINUED | OUTPATIENT
Start: 2018-06-28 | End: 2018-06-29

## 2018-06-28 RX ORDER — ONDANSETRON HYDROCHLORIDE 2 MG/ML
4 INJECTION, SOLUTION INTRAVENOUS
Status: DISCONTINUED | OUTPATIENT
Start: 2018-06-28 | End: 2018-06-28 | Stop reason: HOSPADM

## 2018-06-28 RX ORDER — SODIUM CHLORIDE, SODIUM GLUCONATE, SODIUM ACETATE, POTASSIUM CHLORIDE AND MAGNESIUM CHLORIDE 30; 37; 368; 526; 502 MG/100ML; MG/100ML; MG/100ML; MG/100ML; MG/100ML
INJECTION, SOLUTION INTRAVENOUS CONTINUOUS PRN
Status: DISCONTINUED | OUTPATIENT
Start: 2018-06-28 | End: 2018-06-28 | Stop reason: SURG

## 2018-06-28 RX ORDER — HYDROMORPHONE HYDROCHLORIDE 1 MG/ML
0.5 INJECTION, SOLUTION INTRAMUSCULAR; INTRAVENOUS; SUBCUTANEOUS
Status: DISCONTINUED | OUTPATIENT
Start: 2018-06-28 | End: 2018-06-29

## 2018-06-28 RX ORDER — DEXTROSE 50 % IN WATER (D50W) INTRAVENOUS SYRINGE
25 AS NEEDED
Status: DISCONTINUED | OUTPATIENT
Start: 2018-06-28 | End: 2018-06-29

## 2018-06-28 RX ORDER — SODIUM CHLORIDE, SODIUM LACTATE, POTASSIUM CHLORIDE, CALCIUM CHLORIDE 600; 310; 30; 20 MG/100ML; MG/100ML; MG/100ML; MG/100ML
INJECTION, SOLUTION INTRAVENOUS CONTINUOUS
Status: DISCONTINUED | OUTPATIENT
Start: 2018-06-28 | End: 2018-06-29

## 2018-06-28 RX ORDER — LIDOCAINE HYDROCHLORIDE 10 MG/ML
INJECTION, SOLUTION INFILTRATION; PERINEURAL AS NEEDED
Status: DISCONTINUED | OUTPATIENT
Start: 2018-06-28 | End: 2018-06-28 | Stop reason: SURG

## 2018-06-28 RX ORDER — KETOROLAC TROMETHAMINE 30 MG/ML
15 INJECTION, SOLUTION INTRAMUSCULAR; INTRAVENOUS EVERY 6 HOURS
Status: DISCONTINUED | OUTPATIENT
Start: 2018-06-28 | End: 2018-06-29

## 2018-06-28 RX ORDER — FENTANYL CITRATE 50 UG/ML
50 INJECTION, SOLUTION INTRAMUSCULAR; INTRAVENOUS
Status: DISCONTINUED | OUTPATIENT
Start: 2018-06-28 | End: 2018-06-28 | Stop reason: HOSPADM

## 2018-06-28 RX ORDER — PHENYLEPHRINE HYDROCHLORIDE 10 MG/ML
INJECTION INTRAVENOUS AS NEEDED
Status: DISCONTINUED | OUTPATIENT
Start: 2018-06-28 | End: 2018-06-28 | Stop reason: SURG

## 2018-06-28 RX ORDER — SODIUM CHLORIDE 9 MG/ML
5 INJECTION, SOLUTION INTRAVENOUS AS NEEDED
Status: DISCONTINUED | OUTPATIENT
Start: 2018-06-28 | End: 2018-06-29

## 2018-06-28 RX ORDER — SODIUM CHLORIDE 9 MG/ML
INJECTION, SOLUTION INTRAVENOUS CONTINUOUS
Status: DISCONTINUED | OUTPATIENT
Start: 2018-06-28 | End: 2018-06-29

## 2018-06-28 RX ORDER — ACETAMINOPHEN 325 MG/1
975 TABLET ORAL EVERY 6 HOURS
Status: DISCONTINUED | OUTPATIENT
Start: 2018-06-28 | End: 2018-06-29

## 2018-06-28 RX ORDER — SODIUM CHLORIDE 9 MG/ML
INJECTION, SOLUTION INTRAVENOUS CONTINUOUS PRN
Status: DISCONTINUED | OUTPATIENT
Start: 2018-06-28 | End: 2018-06-28 | Stop reason: SURG

## 2018-06-28 RX ORDER — PROPOFOL 10 MG/ML
INJECTION, EMULSION INTRAVENOUS AS NEEDED
Status: DISCONTINUED | OUTPATIENT
Start: 2018-06-28 | End: 2018-06-28 | Stop reason: SURG

## 2018-06-28 RX ORDER — DOCUSATE SODIUM 100 MG/1
100 CAPSULE, LIQUID FILLED ORAL 2 TIMES DAILY
Status: DISCONTINUED | OUTPATIENT
Start: 2018-06-28 | End: 2018-06-29

## 2018-06-28 RX ORDER — FENTANYL CITRATE 50 UG/ML
INJECTION, SOLUTION INTRAMUSCULAR; INTRAVENOUS AS NEEDED
Status: DISCONTINUED | OUTPATIENT
Start: 2018-06-28 | End: 2018-06-28 | Stop reason: SURG

## 2018-06-28 RX ORDER — DEXTROSE 40 %
15-30 GEL (GRAM) ORAL AS NEEDED
Status: DISCONTINUED | OUTPATIENT
Start: 2018-06-28 | End: 2018-06-29

## 2018-06-28 RX ORDER — ACETAMINOPHEN 325 MG/1
975 TABLET ORAL ONCE
Status: COMPLETED | OUTPATIENT
Start: 2018-06-28 | End: 2018-06-28

## 2018-06-28 RX ORDER — ONDANSETRON HYDROCHLORIDE 2 MG/ML
INJECTION, SOLUTION INTRAVENOUS AS NEEDED
Status: DISCONTINUED | OUTPATIENT
Start: 2018-06-28 | End: 2018-06-28 | Stop reason: SURG

## 2018-06-28 RX ORDER — ROCURONIUM BROMIDE 10 MG/ML
INJECTION, SOLUTION INTRAVENOUS AS NEEDED
Status: DISCONTINUED | OUTPATIENT
Start: 2018-06-28 | End: 2018-06-28 | Stop reason: SURG

## 2018-06-28 RX ORDER — DEXAMETHASONE SODIUM PHOSPHATE 4 MG/ML
INJECTION, SOLUTION INTRA-ARTICULAR; INTRALESIONAL; INTRAMUSCULAR; INTRAVENOUS; SOFT TISSUE AS NEEDED
Status: DISCONTINUED | OUTPATIENT
Start: 2018-06-28 | End: 2018-06-28 | Stop reason: SURG

## 2018-06-28 RX ORDER — HYDROMORPHONE HYDROCHLORIDE 1 MG/ML
0.5 INJECTION, SOLUTION INTRAMUSCULAR; INTRAVENOUS; SUBCUTANEOUS
Status: DISCONTINUED | OUTPATIENT
Start: 2018-06-28 | End: 2018-06-28 | Stop reason: HOSPADM

## 2018-06-28 RX ORDER — HYDROMORPHONE HYDROCHLORIDE 2 MG/ML
INJECTION, SOLUTION INTRAMUSCULAR; INTRAVENOUS; SUBCUTANEOUS AS NEEDED
Status: DISCONTINUED | OUTPATIENT
Start: 2018-06-28 | End: 2018-06-28 | Stop reason: SURG

## 2018-06-28 RX ORDER — BUPIVACAINE HYDROCHLORIDE 5 MG/ML
INJECTION, SOLUTION EPIDURAL; INTRACAUDAL AS NEEDED
Status: DISCONTINUED | OUTPATIENT
Start: 2018-06-28 | End: 2018-06-28 | Stop reason: HOSPADM

## 2018-06-28 RX ORDER — HYDROMORPHONE HYDROCHLORIDE 2 MG/1
2-4 TABLET ORAL EVERY 4 HOURS PRN
Status: DISCONTINUED | OUTPATIENT
Start: 2018-06-28 | End: 2018-06-29

## 2018-06-28 RX ORDER — MONTELUKAST SODIUM 10 MG/1
10 TABLET ORAL DAILY
Status: DISCONTINUED | OUTPATIENT
Start: 2018-06-29 | End: 2018-06-29

## 2018-06-28 RX ORDER — MIDAZOLAM HYDROCHLORIDE 2 MG/2ML
INJECTION, SOLUTION INTRAMUSCULAR; INTRAVENOUS AS NEEDED
Status: DISCONTINUED | OUTPATIENT
Start: 2018-06-28 | End: 2018-06-28 | Stop reason: SURG

## 2018-06-28 RX ORDER — SERTRALINE HYDROCHLORIDE 25 MG/1
25 TABLET, FILM COATED ORAL DAILY
Status: DISCONTINUED | OUTPATIENT
Start: 2018-06-29 | End: 2018-06-29

## 2018-06-28 RX ORDER — GLYCOPYRROLATE 0.6MG/3ML
SYRINGE (ML) INTRAVENOUS AS NEEDED
Status: DISCONTINUED | OUTPATIENT
Start: 2018-06-28 | End: 2018-06-28 | Stop reason: SURG

## 2018-06-28 RX ORDER — IBUPROFEN 200 MG
16-32 TABLET ORAL AS NEEDED
Status: DISCONTINUED | OUTPATIENT
Start: 2018-06-28 | End: 2018-06-29

## 2018-06-28 RX ORDER — NEOSTIGMINE METHYLSULFATE 1 MG/ML
INJECTION INTRAVENOUS AS NEEDED
Status: DISCONTINUED | OUTPATIENT
Start: 2018-06-28 | End: 2018-06-28 | Stop reason: SURG

## 2018-06-28 RX ORDER — SENNOSIDES 8.6 MG/1
1 TABLET ORAL 2 TIMES DAILY PRN
Status: DISCONTINUED | OUTPATIENT
Start: 2018-06-28 | End: 2018-06-29

## 2018-06-28 RX ADMIN — PHENYLEPHRINE HYDROCHLORIDE 100 MCG: 10 INJECTION INTRAVENOUS at 08:03

## 2018-06-28 RX ADMIN — PHENYLEPHRINE HYDROCHLORIDE 100 MCG: 10 INJECTION INTRAVENOUS at 09:53

## 2018-06-28 RX ADMIN — HYDROMORPHONE HYDROCHLORIDE 0.5 MG: 2 INJECTION, SOLUTION INTRAMUSCULAR; INTRAVENOUS; SUBCUTANEOUS at 09:06

## 2018-06-28 RX ADMIN — PHENYLEPHRINE HYDROCHLORIDE 100 MCG: 10 INJECTION INTRAVENOUS at 10:51

## 2018-06-28 RX ADMIN — ROCURONIUM BROMIDE 10 MG: 10 INJECTION, SOLUTION INTRAVENOUS at 13:19

## 2018-06-28 RX ADMIN — LIDOCAINE HYDROCHLORIDE 5 ML: 10 INJECTION, SOLUTION INFILTRATION; PERINEURAL at 07:35

## 2018-06-28 RX ADMIN — Medication 2 G: at 07:35

## 2018-06-28 RX ADMIN — ONDANSETRON 4 MG: 2 INJECTION INTRAMUSCULAR; INTRAVENOUS at 07:50

## 2018-06-28 RX ADMIN — ROCURONIUM BROMIDE 10 MG: 10 INJECTION, SOLUTION INTRAVENOUS at 09:43

## 2018-06-28 RX ADMIN — PHENYLEPHRINE HYDROCHLORIDE 100 MCG: 10 INJECTION INTRAVENOUS at 09:12

## 2018-06-28 RX ADMIN — ROCURONIUM BROMIDE 10 MG: 10 INJECTION, SOLUTION INTRAVENOUS at 13:02

## 2018-06-28 RX ADMIN — FENTANYL CITRATE 50 MCG: 50 INJECTION, SOLUTION INTRAMUSCULAR; INTRAVENOUS at 08:16

## 2018-06-28 RX ADMIN — PHENYLEPHRINE HYDROCHLORIDE 150 MCG: 10 INJECTION INTRAVENOUS at 09:33

## 2018-06-28 RX ADMIN — ROCURONIUM BROMIDE 10 MG: 10 INJECTION, SOLUTION INTRAVENOUS at 11:10

## 2018-06-28 RX ADMIN — PHENYLEPHRINE HYDROCHLORIDE 150 MCG: 10 INJECTION INTRAVENOUS at 09:35

## 2018-06-28 RX ADMIN — KETOROLAC TROMETHAMINE 15 MG: 30 INJECTION, SOLUTION INTRAMUSCULAR at 23:48

## 2018-06-28 RX ADMIN — FENTANYL CITRATE 100 MCG: 50 INJECTION, SOLUTION INTRAMUSCULAR; INTRAVENOUS at 07:35

## 2018-06-28 RX ADMIN — PHENYLEPHRINE HYDROCHLORIDE 100 MCG: 10 INJECTION INTRAVENOUS at 09:22

## 2018-06-28 RX ADMIN — PHENYLEPHRINE HYDROCHLORIDE 100 MCG: 10 INJECTION INTRAVENOUS at 09:43

## 2018-06-28 RX ADMIN — HYDROMORPHONE HYDROCHLORIDE 0.75 MG: 2 INJECTION, SOLUTION INTRAMUSCULAR; INTRAVENOUS; SUBCUTANEOUS at 14:01

## 2018-06-28 RX ADMIN — KETOROLAC TROMETHAMINE 15 MG: 30 INJECTION, SOLUTION INTRAMUSCULAR at 17:51

## 2018-06-28 RX ADMIN — ALBUMIN (HUMAN) 250 ML: 12.5 INJECTION, SOLUTION INTRAVENOUS at 10:01

## 2018-06-28 RX ADMIN — ACETAMINOPHEN 975 MG: 325 TABLET ORAL at 20:13

## 2018-06-28 RX ADMIN — PHENYLEPHRINE HYDROCHLORIDE 100 MCG: 10 INJECTION INTRAVENOUS at 08:12

## 2018-06-28 RX ADMIN — CELECOXIB 400 MG: 200 CAPSULE ORAL at 06:55

## 2018-06-28 RX ADMIN — HYDROMORPHONE HYDROCHLORIDE 0.25 MG: 2 INJECTION, SOLUTION INTRAMUSCULAR; INTRAVENOUS; SUBCUTANEOUS at 11:35

## 2018-06-28 RX ADMIN — PROPOFOL 200 MG: 10 INJECTION, EMULSION INTRAVENOUS at 07:35

## 2018-06-28 RX ADMIN — PHENYLEPHRINE HYDROCHLORIDE 100 MCG: 10 INJECTION INTRAVENOUS at 09:28

## 2018-06-28 RX ADMIN — SODIUM CHLORIDE: 9 INJECTION, SOLUTION INTRAVENOUS at 07:30

## 2018-06-28 RX ADMIN — SODIUM CHLORIDE 1000 ML: 9 INJECTION, SOLUTION INTRAVENOUS at 16:07

## 2018-06-28 RX ADMIN — SODIUM CHLORIDE, SODIUM GLUCONATE, SODIUM ACETATE, POTASSIUM CHLORIDE AND MAGNESIUM CHLORIDE: 526; 502; 368; 37; 30 INJECTION, SOLUTION INTRAVENOUS at 07:39

## 2018-06-28 RX ADMIN — GLYCOPYRROLATE 0.6 MG: 0.2 INJECTION INTRAMUSCULAR; INTRAVENOUS at 13:33

## 2018-06-28 RX ADMIN — MIDAZOLAM HYDROCHLORIDE 2 MG: 1 INJECTION, SOLUTION INTRAMUSCULAR; INTRAVENOUS at 07:29

## 2018-06-28 RX ADMIN — ROCURONIUM BROMIDE 10 MG: 10 INJECTION, SOLUTION INTRAVENOUS at 10:13

## 2018-06-28 RX ADMIN — Medication 1 G: at 11:35

## 2018-06-28 RX ADMIN — FENTANYL CITRATE 50 MCG: 50 INJECTION, SOLUTION INTRAMUSCULAR; INTRAVENOUS at 08:34

## 2018-06-28 RX ADMIN — GABAPENTIN 600 MG: 300 CAPSULE ORAL at 06:55

## 2018-06-28 RX ADMIN — DEXAMETHASONE SODIUM PHOSPHATE 4 MG: 4 INJECTION, SOLUTION INTRAMUSCULAR; INTRAVENOUS at 07:50

## 2018-06-28 RX ADMIN — ONDANSETRON 4 MG: 2 INJECTION INTRAMUSCULAR; INTRAVENOUS at 13:24

## 2018-06-28 RX ADMIN — SODIUM CHLORIDE: 9 INJECTION, SOLUTION INTRAVENOUS at 23:52

## 2018-06-28 RX ADMIN — SODIUM CHLORIDE, SODIUM LACTATE, POTASSIUM CHLORIDE, CALCIUM CHLORIDE: 600; 310; 30; 20 INJECTION, SOLUTION INTRAVENOUS at 06:56

## 2018-06-28 RX ADMIN — ROCURONIUM BROMIDE 50 MG: 10 INJECTION, SOLUTION INTRAVENOUS at 07:35

## 2018-06-28 RX ADMIN — ACETAMINOPHEN 975 MG: 325 TABLET ORAL at 06:54

## 2018-06-28 RX ADMIN — PHENYLEPHRINE HYDROCHLORIDE 150 MCG: 10 INJECTION INTRAVENOUS at 09:58

## 2018-06-28 RX ADMIN — ROCURONIUM BROMIDE 10 MG: 10 INJECTION, SOLUTION INTRAVENOUS at 09:06

## 2018-06-28 RX ADMIN — PHENYLEPHRINE HYDROCHLORIDE 100 MCG: 10 INJECTION INTRAVENOUS at 10:10

## 2018-06-28 RX ADMIN — PHENYLEPHRINE HYDROCHLORIDE 25 MCG/MIN: 10 INJECTION INTRAVENOUS at 11:05

## 2018-06-28 RX ADMIN — ROCURONIUM BROMIDE 20 MG: 10 INJECTION, SOLUTION INTRAVENOUS at 08:15

## 2018-06-28 RX ADMIN — Medication 3 MG: at 13:33

## 2018-06-28 RX ADMIN — DOCUSATE SODIUM 100 MG: 100 CAPSULE, LIQUID FILLED ORAL at 20:14

## 2018-06-28 RX ADMIN — ROCURONIUM BROMIDE 10 MG: 10 INJECTION, SOLUTION INTRAVENOUS at 08:45

## 2018-06-28 RX ADMIN — PHENYLEPHRINE HYDROCHLORIDE 100 MCG: 10 INJECTION INTRAVENOUS at 09:37

## 2018-06-28 RX ADMIN — HYDROMORPHONE HYDROCHLORIDE 0.5 MG: 2 INJECTION, SOLUTION INTRAMUSCULAR; INTRAVENOUS; SUBCUTANEOUS at 08:46

## 2018-06-28 NOTE — OP NOTE
HYSTERECTOMY ABDOMINAL TOTAL LAPAROSCOPIC, Bilateral salpingectomy (B), EUA, CYSTO, PROCTO Procedure Note     Procedure:    HYSTERECTOMY ABDOMINAL TOTAL LAPAROSCOPIC, Bilateral salpingectomy  CPT(R) Code:  52861 - UT LAPAROSCOPY TOT HYSTERECTOMY UTERUS >250 GRAM W TUBE/OVARY    Procedure:    EUA, CYSTO, PROCTO  CPT(R) Code:  79246 - UT CYSTOURETHROSCOPY  o  Indications: The patient was admitted to the hospital with a brief history of fibroid uterus and bleeding. A n exam revealed findings of large fibroid uterus. The patient now presents for total lap hysterectomy, bilateral salpingectomy and cystoscopy after discussing therapeutic alternatives.          Pre-op Diagnosis     * Submucous and subserous leiomyoma of uterus [D25.0, D25.2]    Post-op Diagnosis     * Submucous and subserous leiomyoma of uterus [D25.0, D25.2]    Surgeon: Ciro Monroe MD     Assistants: Jo Ann Waller Py3     Anesthesia: General endotracheal anesthesia    ASA Class: 2      Findings:  18 Week size irregular uterus, Normal external genitalia, urethra, skenes and bartholin glands.  laparoscopy with pelvic adhesions of bowel to left pelvic sidewall and adnexa.  Large uterine fundal Fiborid >10cm with uterine fundus and bilateral cornu approximately 9cm from internal cervical os  Large subserosal fibroid approximately 5 cm in left lower uterine segment.  Climbed left fallopian tube.  Normal appearing bilateral ovaries and right fallopian tube    Procedure Details   TLH, Cysto  Procedure:  The patient was identified in pre-op and patient/surgeon mutually identified. Consents were reconfirmed. She was then taken to the operating room. She was given general anesthesia and endotracheal intubation by the anesthesia team was completed without complication.  She was then placed in the dorsal lithotomy position in universal stirrups and sequential compression devices were put on the lower extremities.  She was prepped and draped in the normal  sterile fashion and a timeout was taken.  A Dickson catheter was placed and a Gray speculum was placed in the vagina.  The cervix was identified and was grasped on the anterior lip with a single-tooth tenaculum. anteriior lip secured with 0-vicyl on Ur6 needle. The uterus sounded to 8cm.  A Matilda 2 uterine manipulator and colpotomy ring was then placed and the remaining instruments were removed. Cervical suture was secured to the colpotomy ring.      After change of gloves, we then turned our attention to the patient's abdomen.  A 5mm supra umbilical incision was made using a scalpel. Verress needle entry technique using a Verrees needle  was used for abdominal entry .Correct placement was confirmed with the drip test. The underlying bowel was examined with no signs of trauma or injury. The abdomen was then insufflated with CO2 gas and pneumoperitoneum then obtained. The patient was placed in Trendelenberg. Left and Right lower quadrant ports were then placed under direct visualization 12 and 5mm respectedly. An additional 5mm port was placed in the Left lower abdomen at the level of the umbilicus.  The uterus was noted to be enlarge in appearance and  mobile. There was a abnormal appearing left tube and normal ovary.  Additional findings included as noted above. A brief survey of the upper abdomen revealed no abnormalities. There were left pelvic sidewall adhesions elsewhere which was removed with Lysis of adhesions to expose the adnexa..      We focused our attention on the left adnexae using harmonic scalpel throughout the case is energy. The left fallopian tube was isolated along the mesosalpinx, serially cauterized and transected to approximately 2 cm lateral to the uterine fundus.  The fallopian tube was grasped at its proximal location removed through 12mm port. The uteroovarian and round ligaments were then serially cauterized transected.  The retroperitoneal space was dissected to identify the course of the  ureter but inferior to uterine artery.  The anterior bladder flap was then created from the left side, and further dissected off the cervix.  The uterine artery was cauterized at the level of the internal cervical loss and transected. Subsequent bleeding in this area persisted despit application of clips laterally on the pedical.  A ligasure device was utilized both proximal and distal to transection site to control active and back-bleeding.    We then focused our attention on careful dissection of the right adnexal adhesions.  The left fallopian tube was asked at its distal end and isolated along the mesosalpinx, serially cauterized and transected to approximately 2 cm lateral to the uterine fundus.  The tube was grasped at its most proximal and and removed through 12mm port.  The right ureter was visualized trans peritoneum. The right round ligament was then transected and a retroperitoneal dissection identified the course of the ureter was completed.  An anterior bladder flap was completed using both blunt and sharp dissection.The uretero-ovarian ligament was then cauterized transected.  The right uterine artery was skeletonized cauterized and transected with Ligasure energy.     At this time, the bladder was well isolated from the cervical cup in avascular planes. copious irrigation and suction to clear blood and clot was performed.The cardinal ligaments were then transected towards the colpotomizer ring.  The posterior colpotomy was made and the dissection was completed in a circumferential manner using the colpotomizer as a guide until the entire specimen was detached from the vagina.  The specimen was then removed to the upper abdomen.    A gel port was placed through a 4cm infra umbilical incsion and cap applied.  A 2 L contained extraction bag was  placed into the abdomen through the umbilical port.  The abdomen was insufflated and the specimen placed inside the bag.  Traction bag was delivered through the  umbilical port and the protective barrier placed.  The specimen was extrctracted utilizinag the contained extraction technique.  The specimen was handed off for final pathology.    The bag was removed and noted to be intact.  The abdomen was reinsufflated and inspected for hemostasis.  Was copiously irrigated and cleared of clot and debris with suction. An area of left ovary was cauterized with ligasure.        Survey of the pelvis was then carried out. A suction  was used to clean out the pelvic cavity and hemostasis of the cuff was apparent. No active bleeding was apparent.  The vaginal cuff was then closed with a running stitch of 2-0 unidirectional barbed suture double layer closure. The ureters were again visualized bilaterally and noted to be peristalsing.     At that time, the Dickson catheter was removed and the bladder was filled with 300 mL of sterile water and a cystoscopy was carried out.  Thorough bladder survey was performed. The bladder was intact with no obvious injuries and both ureteral orifices were identified and seen active ureteral jets bilaterally. The bladder was drained. The Dickson catheter was not replaced.    Pneumoperitoneum was then released through the laparoscopic ports, which were then removed. The fascia of the infra umbilicalport was approximated with 0 PDS in a running  and LLQ port fascia approximated with 0-vicryl stitch.  The all incision and port sites were closed each with a subcuticular stitch of 4-0 Monocryl suture and Dermabond dressing. All instruments were removed and counts were correct by the operating room staff. The vagina was inspected and noted clear of all instruments and sponges. She was extubated and taken to the recovery room in stable condition.  She tolerated the procedure well.      Estimated Blood Loss:  700 mL           Drains: none           Total IV Fluids: See Anesthesia record;  Includes 1unit PRBC  & colloid           Specimens:   ID Type Source  Tests Collected by Time Destination   1 : Left Fallopian Tube  Tissue Fallopian Tube, Left PATHOLOGY TISSUE EXAM Ciro Monroe MD 6/28/2018 0849    2 : Right Fallopian Tube Tissue Fallopian Tube, Right PATHOLOGY TISSUE EXAM Ciro Monroe MD 6/28/2018 0852    3 : Uterus and Cervix Tissue Uterus PATHOLOGY TISSUE EXAM Ciro Monroe MD 6/28/2018 1325    A : CBC Blood Blood, Venous CBC Sheila Gardner CRNA 6/28/2018 1031               Implants: * No implants in log *           Complications:  None; complexity of procedure increased secondary to pelvic adhesions requiring lysisi of adhesions for approximately 1-20 minutes to expose adnexa on left and vasculature.  Furthermore, complex opertaion secondary to  large uterus with parasitic vessels and greater than expected bleeding and requiring manual contained extraction.            Disposition: PACU - hemodynamically stable.           Condition: stable    Ciro Monroe MD  Phone Number: [phone]

## 2018-06-28 NOTE — INTERVAL H&P NOTE
H&P reviewed. The patient was examined and there are no changes to the H&P. patient acknowledges planned procedure TLH/BS/Cystoscopy and desires to retain ovaries accepting associated risk and benefits.  Presents with spouse and authorizes release of perioperative findings.

## 2018-06-28 NOTE — DISCHARGE INSTRUCTIONS
Laparoscopic Surgery  Discharge Instructions    What to Expect  It is a myth that patients who have laparoscopic surgery bounce right back after surgery. Please resume activities slowly.    It is normal for the incisions to have some staining/spotting initially. It is also normal for one incision to be slighter larger and sometimes more painful than the rest.    Typically patients only need narcotic pain medication for a few days following surgery. You should continue to take Tylenol (Acetaminophen) Extra Strength 2 tablets every 6 hours or Ibuprofen (Motrin, Advil) 600 every 6 hours with food if needed for pain.    Try to drink plenty of fluids. It you become constipated you may try taking a laxative (e.g. Milk of Magnesia, Miralax, Sennakot, Dulcolax).      Activity  Do not lift more than 25lbs. for at least 2 weeks from the time of surgery.    Climbing stairs is ok but keep to a minimum the first couple of weeks.    If you have had a hysterectomy (removal of the uterus) no sexual intercourse, tampons or douching until you’re examined at your first post operative exam.    You may resume driving when you feel able to control a vehicle. Do not drive if using prescription pain medications.     You may shower normally. The incisions usually have dissolvable sutures and/or glue at the incision sites. Please keep clean and dry. Just pat the area dry after a shower.      PLEASE CALL THE OFFICE TO SCHEDULE A FOLLOW-UP EXAM IN 2 WEEKS FROM TIME OF SURGERY.    PLEASE CALL THE OFFICE IF YOU EXPERIENCE ANY OF THE FOLLOWING:  - Heavy bleeding. This is if you are using more than 1 pad per hour.  - Fever greater than 100.4.   - Foul smelling vaginal discharge.   - Constipation not relieved by milk of magnesia.  - Redness at incision or worsening pain or any other concerns.

## 2018-06-28 NOTE — ANESTHESIA PROCEDURE NOTES
Airway  Urgency: elective    Start Time: 6/28/2018 7:37 AM  Airway not difficult    General Information and Staff    Patient location during procedure: OR  Anesthesiologist: AYSE CORNEJO  Resident/CRNA: JON JOLLEY  Performed: anesthesiologist and resident/CRNA     Indications and Patient Condition  Indications for airway management: anesthesia  Sedation level: deep  Preoxygenated: yes  Patient position: sniffing  Mask difficulty assessment: 1 - vent by mask    Final Airway Details  Final airway type: endotracheal airway      Successful airway: ETT  Cuffed: yes   Successful intubation technique: direct laryngoscopy  Facilitating devices/methods: intubating stylet  Endotracheal tube insertion site: oral  Blade: Nydia  Blade size: #3  ETT size: 7.0 mm  Cormack-Lehane Classification: grade I - full view of glottis  Placement verified by: chest auscultation and capnometry   Measured from: lips  ETT to lips (cm): 22  Number of attempts at approach: 1  Atraumatic airway insertion

## 2018-06-28 NOTE — ANESTHESIA PREPROCEDURE EVALUATION
Anesthesia ROS/MED HX      Pulmonary    asthma  Neuro/Psych    Anxiety   Substance abuse (daily wine)  Musculoskeletal   Arthritis (RA - well controlled. No steroid use)  ROS/MED HX Comments:    Cardiology: palpitations    Patient Active Problem List   Diagnosis   • Intramural and submucous leiomyoma of uterus   • Submucous and subserous leiomyoma of uterus   • At high risk for breast cancer   • Fibroid (bleeding) (uterine)       Past Surgical History:   Procedure Laterality Date   • COLONOSCOPY  2016   • HERNIA REPAIR  12/2016       Current Facility-Administered Medications   Medication Dose Route Frequency   • ceFAZolin  2 g intravenous On call to OR   • celecoxib  400 mg oral Once   • gabapentin  600 mg oral Once   • lactated ringer's   intravenous Continuous       Prior to Admission medications    Medication Sig Start Date End Date Taking? Authorizing Provider   montelukast (SINGULAIR) 10 mg tablet Take 1 tablet by mouth daily.    Historical Provider, MD   PROAIR HFA 90 mcg/actuation inhaler Inhale 2 puffs 4 (four) times a day as needed. 1/24/18   Historical Provider, MD   sertraline (ZOLOFT) 50 mg tablet Take 25 mg by mouth daily.   1/26/18   Historical Provider, MD   SYMBICORT 80-4.5 mcg/actuation inhaler Inhale 1 puff daily. 12/29/17   Historical Provider, MD       CBC Results       06/04/18                          0821           WBC 5.13           RBC 4.57           HGB 14.6           HCT 43.7           MCV 95.6           MCH 31.9           MCHC 33.4                                      BMP Results       06/04/18                          0821            (L)           K 4.2           Cl 100           CO2 25           Glucose 112 (H)           BUN 14           Creatinine 0.7           Calcium 9.9           Anion Gap 10           EGFR &gt;60.0                           Lab Results   Component Value Date    HCGPREGUR Negative 06/28/2018             No orders to display       Physical  Exam    Airway   Mallampati: III   TM distance: >3 FB   Neck ROM: full  Cardiovascular - normal   Rhythm: regular   Rate: normal  Pulmonary - normal   clear to auscultation  Dental - normal        Anesthesia Plan    Plan: general    Technique: general endotracheal     Lines and Monitors: PIV and additional IV     Airway: direct visual laryngoscopy and oral intubation   ASA 2  Anesthetic plan and risks discussed with: patient  Induction:    intravenous   Postop Plan:   Patient Disposition: inpatient floor planned admission   Pain Management: IV analgesics

## 2018-06-28 NOTE — H&P (VIEW-ONLY)
2018    Madonna Tipton is a 53 y.o. LMP 31 May 2018 female who presents with uterine fibroids. Periods are regular every 21-24 days, lasting 3 days. Dysmenorrhea:mild, occurring first 1-2 days of flow. Cyclic symptoms include bloating and pelvic pain. No intermenstrual bleeding, spotting, or discharge. Patient reports no change in flow or symptoms and presents for preoperative exam for hysterectomy.    Current contraception: Infertility, tubal factor  History of abnormal Pap smear: no  Family history of uterine or ovarian cancer: no  Regular self breast exam: not applicable  History of abnormal mammogram: no  Family history of breast cancer: yes - Mother age 54 yo Mammogram normal May 2018  History of abnormal lipids: no  Menstrual History:  OB History      Para Term  AB Living    2 1     1 1    SAB TAB Ectopic Multiple Live Births                      Menarche age: 14  Patient's last menstrual period was 2018.     ULTRASOUND PELVIS W TRANSVAGINAL   Status: Final result   Study Result     CLINICAL HISTORY:  History of fibroids; LMP three weeks ago.     COMMENT: Transabdominal and transvaginal ultrasound of the pelvis is performed.  Transvaginal examination was performed to better evaluate the endometrium and  the ovaries.  There are no prior examinations for comparison.  Transabdominally,  the urinary bladder is normal.  Uterus measures 13.7 cm in length x 5.1 cm in AP  x 8.6 cm in transverse dimension.  The endometrial stripe is homogeneous  measuring 0.9 cm in thickness.  There is no abnormal vascular flow within the  endometrium.  Multiple fibroids are identified.  The first fibroid is seen in  the right aspect of the mid-uterus measuring 5.3 cm x 5.8 cm x 6.2 cm.  Second  fibroid is seen in the left lower uterine segment anteriorly measuring 1.9 cm x  1.7 cm x 2.2 cm.  Third fibroid is seen posterior right aspect of the fundus  measuring 8.6 cm x 6.1 cm x 7.2 cm.  Right ovary is not  "seen.  Left ovary  measures 3.4 cm x 4.7 cm x 4.1 cm.  A cyst is visualized within it measuring 3.4  cm x 2.3 cm x 2.8 cm.  No free fluid is noted.     --  IMPRESSION:  1.  Fibroid uterus with normal-appearing endometrial stripe.  2.  Nonvisualization of the right ovary with normal-appearing left ovary.   Result History     ULTRASOUND PELVIS W TRANSVAGINAL (Order #[id number]) on 3/9/2018 - Order Result History Report - Result Edited   Percentile Graphs     No pregnancy episode available   Signed by     Reading Radiologist Date/Time    Tomy Duran MD 3/09/2018 14:55       The following portions of the patient's history were reviewed and updated as appropriate: allergies, current medications, past family history, past medical history, past social history, past surgical history and problem list.    BP (!) 163/84 (BP Location: Left upper arm, Patient Position: Sitting)   Pulse 96   Resp 16   Ht 1.651 m (5' 5\")   Wt 74.3 kg (163 lb 12.8 oz)   LMP 05/31/2018   Breastfeeding? No   BMI 27.26 kg/m²   HPI  Review of Systems   Constitutional: Negative.    HENT: Negative.    Eyes: Negative.    Respiratory: Negative.    Cardiovascular: Negative.    Gastrointestinal: Negative.    Endocrine: Negative.    Genitourinary:        As per HPI   Musculoskeletal: Negative.    Skin: Negative.    Allergic/Immunologic:        Reviewed and updated   Neurological: Negative.    Psychiatric/Behavioral: Negative.      Physical Exam   Constitutional: She is oriented to person, place, and time. She appears well-developed and well-nourished.   Genitourinary: Vagina normal.   External female genitalia normal.   Urethral meatus normal.   Urethra normal.   Vagina normal.   Cervix exam normal.Uterus is enlarged (18 week size and irregular with low anterior fibroid.). Uterine contour is irregular.   Adnexa normal.   Cardiovascular: Normal rate.    Pulmonary/Chest: Effort normal.   Abdominal: Soft. She exhibits distension. There is no tenderness. " There is no guarding.   Neurological: She is alert and oriented to person, place, and time.   Skin: Skin is warm and dry.   Vitals reviewed.      Assessment/Plan   Symptomatic uterine fibroids.  Desires total hysterectomy bilateral salpingectomy cystoscopy  with ovarian conservation.  Risk and beneifts of procedure as well as alternatives discussed.  EMB done.    Counseling    The patient was counseled for  total laparoscopic hysterectomy, bilateral salpingectomy, cystoscopy and possible open abdominal surgery.  She was counseled for the risk of surgery to include bleeding infection or allergies to medication or anesthesia.  She was further informed of the risk of hysterectomy including risk of injury to adjacent organs including but not limited to bowel, bladder, ureters, major blood vessels and nerves.  Patient is aware of injury identified intraoperatively would be repaired at the time of surgery although there is the risk for additional surgery, prolonged surgical time or hospitalization related to unanticipated complication.  The patient is aware that the inability to complete procedure safely laparoscopically may require an open abdominal surgery which may further prolonged hospital stay and recovery time.  She was counseled of the risk for excessive operative blood loss requiring transfusion of blood products and complications of wound healing including wound separation and infection.  Patient verbalized understanding of the associated operative risk. Additional questions including sexual function, vaginal length and risk benefits of ovarian conservation were addressed.  Procedure: The patient was counseled on the necessity of endometrial biopsy to rule out pathology. She was advised of the risks and agreed to proceed.    The cervix was visualized with a speculum and prepped with betadine. The anterior lip was steadied with a single-tooth tenaculum. The os was dilated with plastic dilator and the uterus  sounded to 10 cm. A moderate amount of tissue with blood was obtained. The tenaculum site was hemostatic. The patient tolerated the procedure well.          Return in about 4 weeks (around 7/2/2018) for Post Op Visit after 6/28 Surgery.  Ciro Monroe MD

## 2018-06-28 NOTE — ANESTHESIA POSTPROCEDURE EVALUATION
Patient: Madonna Tipton    Procedure Summary     Date:  06/28/18 Room / Location:  [facility] OR 6 / [facility]    Anesthesia Start:  0730 Anesthesia Stop:  1355    Procedures:       HYSTERECTOMY ABDOMINAL TOTAL LAPAROSCOPIC, Bilateral salpingectomy (Bilateral )      EUA, CYSTO, PROCTO (N/A ) Diagnosis:       Submucous and subserous leiomyoma of uterus      (Submucous and subserous leiomyoma of uterus [D25.0, D25.2])    Surgeon:  Ciro Monroe MD Responsible Provider:  Harvey Villagomez MD    Anesthesia Type:  general ASA Status:  2          Anesthesia Type: general  PACU Vitals  6/28/2018 1351 - 6/28/2018 1451      6/28/2018 1400 6/28/2018 1415 6/28/2018 1430 6/28/2018 1445    BP: 133/72 (!)  96/58 (!)  109/58 (!)  103/52    Temp: 36.7 °C (98 °F) - - -    Pulse: (!)  116 (!)  104 100 (!)  114    Resp: (!)  7 13 12 20    SpO2: 99 % 99 % 99 % 99 %            Anesthesia Post Evaluation    Pain management: satisfactory to patient  Mode of pain management: IV medication  Patient location during evaluation: PACU  Patient participation: complete - patient participated  Level of consciousness: awake and alert  Cardiovascular status: acceptable and hemodynamically stable  Airway Patency: adequate  Respiratory status: acceptable  Hydration status: stable  Anesthetic complications: no

## 2018-06-28 NOTE — PROGRESS NOTES
Post-Op Progress Note    Subjective  Seen and examined after Dr. Monroe. Pain is controlled with toradol and tylenol; not requiring narcotics yet. Not yet OOB, but planning to sit in chair now. Tolerating clears, ordered dinner chair. Dickson was removed in OR, for TOV by 2000. Denies HA, CP, SOB, N/V, dizziness, palpitations.    Vitals  Temp:  [36.5 °C (97.7 °F)-36.8 °C (98.3 °F)] 36.7 °C (98 °F)  Heart Rate:  [] 82  Resp:  [7-20] 16  BP: ()/(52-72) 122/63    I&O    Intake/Output Summary (Last 24 hours) at 06/28/18 1844  Last data filed at 06/28/18 1322   Gross per 24 hour   Intake             3625 ml   Output             1050 ml   Net             2575 ml       Physical Exam  General: A&Ox3 and NAD   Heart: Regular rate and rhythm  Lungs: Clear to auscultation bilaterally  Abdomen: soft, mild distention, normoactive BS, mild tenderness to palpation, no rebound/rigidity/guarding.  Incision: c/d/i with dermabond  Extremities: symmetric and no edema  Neuro: Grossly intact, no deficits noted.      Assessment/Plan   Problem-based Assessment and Plan    Madonna Tipton is a 53 y.o. POD#0 s/p TLH, BS, cystoscopy 2/2 fibroids    1. AFVSS  2. Dickson catheter removed in OR, for TOV until 2200  3. Pain control: continue tylenol/toradol. Dilaudid PO/IV PRN  4. EBL 700cc - s/p 1u pRBC in OR. PACU CBC 10.8. For repeat CBC in AM  5. DVT px: SCDs, encourage ambulation tonight.   6. Advance diet as tolerated  7. Anxiety: zolofrt 25mg qD   8. Discussed with Dr. Monroe. Continue routine PO care.    Julisa Mccormick MD     ATTENDING NOTE    I saw and evaluated the patient.  I discussed the procedure and findings including indication for transfusion with the patient and spouse.   I reviewed the resident's note and agree with the findings and plan as documented in the note.Antiicapte discharge tomorrow if meeting discharge criteria.      MARIA LUISA BYRNES M.D. FACOG  ATTENDING

## 2018-06-29 VITALS
RESPIRATION RATE: 18 BRPM | WEIGHT: 164 LBS | OXYGEN SATURATION: 100 % | BODY MASS INDEX: 27.32 KG/M2 | DIASTOLIC BLOOD PRESSURE: 60 MMHG | HEIGHT: 65 IN | HEART RATE: 87 BPM | TEMPERATURE: 98 F | SYSTOLIC BLOOD PRESSURE: 117 MMHG

## 2018-06-29 LAB
CASE RPRT: NORMAL
CLINICAL INFO: NORMAL
ERYTHROCYTE [DISTWIDTH] IN BLOOD BY AUTOMATED COUNT: 13.9 % (ref 11.7–14.4)
ERYTHROCYTE [DISTWIDTH] IN BLOOD BY AUTOMATED COUNT: 14.2 % (ref 11.7–14.4)
HCT VFR BLDCO AUTO: 24 % (ref 35–45)
HCT VFR BLDCO AUTO: 25.9 % (ref 35–45)
HGB BLD-MCNC: 8 G/DL (ref 11.8–15.7)
HGB BLD-MCNC: 8.6 G/DL (ref 11.8–15.7)
MCH RBC QN AUTO: 32.3 PG (ref 28–33.2)
MCH RBC QN AUTO: 32.3 PG (ref 28–33.2)
MCHC RBC AUTO-ENTMCNC: 33.2 G/DL (ref 32.2–35.5)
MCHC RBC AUTO-ENTMCNC: 33.3 G/DL (ref 32.2–35.5)
MCV RBC AUTO: 96.8 FL (ref 83–98)
MCV RBC AUTO: 97.4 FL (ref 83–98)
PATH REPORT.FINAL DX SPEC: NORMAL
PATH REPORT.GROSS SPEC: NORMAL
PDW BLD AUTO: 9.7 FL (ref 9.4–12.3)
PDW BLD AUTO: 9.8 FL (ref 9.4–12.3)
PLATELET # BLD AUTO: 158 K/UL (ref 150–369)
PLATELET # BLD AUTO: 170 K/UL (ref 150–369)
RBC # BLD AUTO: 2.48 M/UL (ref 3.93–5.22)
RBC # BLD AUTO: 2.66 M/UL (ref 3.93–5.22)
WBC # BLD AUTO: 9.55 K/UL (ref 3.8–10.5)
WBC # BLD AUTO: 9.75 K/UL (ref 3.8–10.5)

## 2018-06-29 PROCEDURE — 63700000 HC SELF-ADMINISTRABLE DRUG: Performed by: OBSTETRICS & GYNECOLOGY

## 2018-06-29 PROCEDURE — 36415 COLL VENOUS BLD VENIPUNCTURE: CPT | Performed by: OBSTETRICS & GYNECOLOGY

## 2018-06-29 PROCEDURE — 63600000 HC DRUGS/DETAIL CODE: Performed by: OBSTETRICS & GYNECOLOGY

## 2018-06-29 PROCEDURE — 85027 COMPLETE CBC AUTOMATED: CPT | Performed by: OBSTETRICS & GYNECOLOGY

## 2018-06-29 PROCEDURE — G0378 HOSPITAL OBSERVATION PER HR: HCPCS

## 2018-06-29 RX ORDER — IBUPROFEN 400 MG/1
400 TABLET ORAL ONCE
Status: DISCONTINUED | OUTPATIENT
Start: 2018-06-29 | End: 2018-06-29 | Stop reason: HOSPADM

## 2018-06-29 RX ADMIN — ACETAMINOPHEN 975 MG: 325 TABLET ORAL at 09:20

## 2018-06-29 RX ADMIN — KETOROLAC TROMETHAMINE 15 MG: 30 INJECTION, SOLUTION INTRAMUSCULAR at 05:52

## 2018-06-29 RX ADMIN — ACETAMINOPHEN 975 MG: 325 TABLET ORAL at 02:11

## 2018-06-29 RX ADMIN — DOCUSATE SODIUM 100 MG: 100 CAPSULE, LIQUID FILLED ORAL at 09:20

## 2018-06-29 NOTE — PLAN OF CARE
Problem: Patient Care Overview  Goal: Plan of Care Review  Outcome: Ongoing (interventions implemented as appropriate)   06/29/18 0512   Coping/Psychosocial   Plan Of Care Reviewed With spouse;patient   Plan of Care Review   Progress progress toward functional goals as expected

## 2018-06-29 NOTE — PROGRESS NOTES
Progress Note    Events  Pt seen/examined. No acute events overnight.     Subjective  Pain: Yes,  controlled  Voiding: without difficulty  Diet: taking regular diet  Bowel: no flatus no bowel movement  Ambulating: as tolerated  Denies: HA, CP, SOB, N/V and F/C    Vitals  Temp:  [36.5 °C (97.7 °F)-36.8 °C (98.3 °F)] 36.7 °C (98 °F)  Heart Rate:  [] 80  Resp:  [7-166] 16  BP: ()/(52-72) 108/52    I&O    Intake/Output Summary (Last 24 hours) at 06/29/18 0644  Last data filed at 06/29/18 0556   Gross per 24 hour   Intake             5385 ml   Output             2825 ml   Net             2560 ml       Physical Exam  General: A&Ox3 and NAD   Heart: Regular rate and rhythm  Lungs: Clear to auscultation bilaterally  Abdomen: soft, mildly distended, minimally tender to palpation, positive bowel sounds, no rebound/rigidity/guarding.  Incisions: healing well  Extremities: symmetric and no edema      Assessment/Plan   Problem-based Assessment and Plan    Madonna Tipton is a 53 y.o. POD#0 s/p TLH, BS, cystoscopy 2/2 fibroids     1. AFVSS. Patient voiding and tolerating regular diet.  2. Pain control: continue tylenol/toradol. Dilaudid PO/IV PRN  3. EBL 700cc - s/p 1u pRBC in OR. Hgb 14.6 preop --> 9.5 intraop --> 10.8 immediately postop s/p 1U PRBCs --> 8.0 this Am. Patient with no signs or symptoms of acute anemia.   4. DVT px: SCDs, encourage ambulation.   5. Anxiety: zolofrt 25mg qD   6. Routine postop care. Likely stable for discharge home today.     Will discuss further plan of care with Dr. Monroe.       Jo Ann Waller MD     ATTENDING NOTE    I saw and evaluated the patient.  I reviewed the resident's note and agree with the findings and plan as documented in the note. n patient ambulating at time of visit alert and conversive. Objectively - VSS, adequate UOP, Exam - Abd mild distention with no rebound guarding or pain.  Appropriate wound healing. hgb 10.8-8.0.  Patient meeting all clinical criteria for  discharge.  Hgb drop is concerning for on-going blood loss vs. Equilibration. No clinical evidence of hemopeirtoneum or on-going blood loss. Repeat at 4 hours.  Patient aware.  May be discharged to home if stable.      MARIA LUISA BYRNES M.D. FACOG  ATTENDING

## 2018-06-29 NOTE — NURSING NOTE
Pt was bladder scanned at 2030 for 386ml. She was only able to void 75ml. I straight cathed her for 550ml at 2135.

## 2018-06-29 NOTE — PROGRESS NOTES
Met with patient at bedside; resides with spouse and disabled son in [address] home; has first floor bedroom and bath; spouse available to assist with care of son; no barriers to surgical follow up; confirmed PCP and pharmacy information; no skilled needs identified; patient with no concerns regarding discharge.

## 2018-06-29 NOTE — NURSING NOTE
I verified with Nuzhat Winslow that pt received 1 unit prbc's and the 2nd unit is on hold pending H+H in the am.

## 2018-06-29 NOTE — PROGRESS NOTES
Patient: Madonna Blackin  Procedure(s):  HYSTERECTOMY ABDOMINAL TOTAL LAPAROSCOPIC, Bilateral salpingectomy  EUA, CYSTO, PROCTO  Patient location: [facility] Floor    Last vitals:   Vitals:    06/29/18 0630   BP: (!) 111/55   Pulse: 84   Resp: 16   Temp: 36.7 °C (98 °F)   SpO2:      Level of consciousness: awake, alert and oriented  Post-anesthesia pain: adequate analgesia  Anesthetic complications: no

## 2018-07-02 ENCOUNTER — TELEPHONE (OUTPATIENT)
Dept: GYNECOLOGIC ONCOLOGY | Facility: CLINIC | Age: 53
End: 2018-07-02

## 2018-07-02 LAB
CROSSMATCH: NORMAL
CROSSMATCH: NORMAL
ISBT CODE: 600
ISBT CODE: 600
PRODUCT CODE: NORMAL
PRODUCT CODE: NORMAL
PRODUCT STATUS: NORMAL
PRODUCT STATUS: NORMAL
SPECIMEN EXP DATE BLD: NORMAL
SPECIMEN EXP DATE BLD: NORMAL
UNIT ABO: NORMAL
UNIT ABO: NORMAL
UNIT ID: NORMAL
UNIT ID: NORMAL
UNIT RH: NEGATIVE
UNIT RH: NEGATIVE

## 2018-07-02 NOTE — TELEPHONE ENCOUNTER
I tried to get in contact with Madonna about her concerns and left a message. I let her know I will be back in the office tomorrow so we can talk further about what is happening.

## 2018-07-30 ENCOUNTER — OFFICE VISIT (OUTPATIENT)
Dept: GYNECOLOGIC ONCOLOGY | Facility: CLINIC | Age: 53
End: 2018-07-30
Attending: OBSTETRICS & GYNECOLOGY
Payer: COMMERCIAL

## 2018-07-30 VITALS
WEIGHT: 158.2 LBS | HEIGHT: 65 IN | SYSTOLIC BLOOD PRESSURE: 147 MMHG | HEART RATE: 103 BPM | DIASTOLIC BLOOD PRESSURE: 89 MMHG | TEMPERATURE: 98.3 F | BODY MASS INDEX: 26.36 KG/M2

## 2018-07-30 DIAGNOSIS — Z78.0 MENOPAUSE: ICD-10-CM

## 2018-07-30 DIAGNOSIS — Z90.710 STATUS POST LAPAROSCOPIC HYSTERECTOMY: Primary | ICD-10-CM

## 2018-07-30 PROCEDURE — 99024 POSTOP FOLLOW-UP VISIT: CPT | Performed by: OBSTETRICS & GYNECOLOGY

## 2018-07-30 ASSESSMENT — ENCOUNTER SYMPTOMS
MUSCULOSKELETAL NEGATIVE: 1
ENDOCRINE NEGATIVE: 1
ALLERGIC/IMMUNOLOGIC COMMENTS: REVIEWED AND UPDATED
CONSTITUTIONAL NEGATIVE: 1
CARDIOVASCULAR NEGATIVE: 1
GASTROINTESTINAL NEGATIVE: 1
EYES NEGATIVE: 1
PSYCHIATRIC NEGATIVE: 1
NEUROLOGICAL NEGATIVE: 1
RESPIRATORY NEGATIVE: 1

## 2018-07-30 ASSESSMENT — PAIN SCALES - GENERAL: PAINLEVEL: 0-NO PAIN

## 2018-07-30 NOTE — PROGRESS NOTES
"Patient ID: Madonna Tipton                              : 1965  MRN: [id number]                                          Visit Date: 2018     Patient presents to the office 4 weeks status post TLH BS for 18 week  fibroid uterus 18 for fibroids. Doing well postoperatively.   Eating a regular diet without difficulty. Bowel movements are normal. Pain is controlled without any medications.    Operative findings again reviewed. Pathology report discussed.    1. Continue any current medications.  2. Wound care discussed.  3. Activity restrictions: Pelvic rest; nothing in the vagina x 8 weeks  4. Anticipated return to work: now.  5. Follow up: 1 year  for annual GYN exam.    The following have been reviewed and updated as appropriate in this visit:      BP (!) 147/89 (BP Location: Left upper arm, Patient Position: Sitting)   Pulse (!) 103   Temp 36.8 °C (98.3 °F) (Oral)   Ht 1.651 m (5' 5\")   Wt 71.8 kg (158 lb 3.2 oz)   LMP 2018   Breastfeeding? No   BMI 26.33 kg/m²           Review of Systems   Constitutional: Negative.    HENT: Negative.    Eyes: Negative.    Respiratory: Negative.    Cardiovascular: Negative.    Gastrointestinal: Negative.    Endocrine: Negative.    Genitourinary:        As per HPI   Musculoskeletal: Negative.    Skin: Negative.    Allergic/Immunologic:        Reviewed and updated   Neurological: Negative.    Psychiatric/Behavioral: Negative.      Physical Exam   Constitutional: She is oriented to person, place, and time. She appears well-developed and well-nourished.   Genitourinary: Vagina normal.   External female genitalia normal.   Urethral meatus normal.   Urethra normal.   Normal bladder.   Vagina normal. The cervix is absent.   Uterus is absent.   Adnexa normal.   Genitourinary Comments: Vaginal cuff intact with residual v-loc suture fragments.  Well supported without evidence of cystocele or rectocele.   Eyes: Pupils are equal, round, and reactive to light. "   Cardiovascular: Normal rate.    Pulmonary/Chest: Effort normal. No respiratory distress.   Neurological: She is alert and oriented to person, place, and time.   Skin: Skin is warm and dry.   Well healed laparoscopic incisions.  4-0 suture tail at RUQ site removed with pick ups     Psychiatric: She has a normal mood and affect.   Nursing note and vitals reviewed.    Notes Recorded by Ciro Monroe MD on 7/6/2018 at 12:00 PM EDT  FYI    1mo ago Resulting Agency   Final Diagnosis   A.  Left Fallopian Tube :    Benign fallopian tube with no specific pathologic changes.     B.  Right Fallopian Tube:    Benign fallopian tube with no specific pathologic changes.     C.  Uterus and Cervix:    Benign secretory endometrium.  Leiomyomas.  Cervix with no specific pathologic changes.         Electronically signed by Vidhya Arzola MD on 6/29/2018 at 1558             The following portions of the patient's history were reviewed and updated as appropriate: allergies, current medications, past family history, past medical history, past social history, past surgical history and problem list.  Diagnoses and all orders for this visit:    Status post laparoscopic hysterectomy  Comments:  4 weeks s/p TLH/ bilateral salpingectomy.  Doing well.  May resume normal activity with out restriction.  No vaginal intercourse for 8weeks post op.     Menopause    .  All questions answered.  Pathology reviewed..  Follow-up  Annual Exam.  No further PAP indicated.   Ciro Monroe MD

## 2018-08-29 ENCOUNTER — OFFICE VISIT (OUTPATIENT)
Dept: GYNECOLOGIC ONCOLOGY | Facility: CLINIC | Age: 53
End: 2018-08-29
Attending: OBSTETRICS & GYNECOLOGY
Payer: COMMERCIAL

## 2018-08-29 ENCOUNTER — TELEPHONE (OUTPATIENT)
Dept: GYNECOLOGIC ONCOLOGY | Facility: CLINIC | Age: 53
End: 2018-08-29

## 2018-08-29 VITALS
RESPIRATION RATE: 18 BRPM | HEIGHT: 65 IN | HEART RATE: 97 BPM | SYSTOLIC BLOOD PRESSURE: 144 MMHG | DIASTOLIC BLOOD PRESSURE: 88 MMHG | TEMPERATURE: 98.7 F | WEIGHT: 164.4 LBS | BODY MASS INDEX: 27.39 KG/M2

## 2018-08-29 DIAGNOSIS — Z90.710 STATUS POST LAPAROSCOPIC HYSTERECTOMY: Primary | ICD-10-CM

## 2018-08-29 PROCEDURE — 99024 POSTOP FOLLOW-UP VISIT: CPT | Performed by: OBSTETRICS & GYNECOLOGY

## 2018-09-05 ASSESSMENT — ENCOUNTER SYMPTOMS
RESPIRATORY NEGATIVE: 1
CARDIOVASCULAR NEGATIVE: 1
ENDOCRINE NEGATIVE: 1
GASTROINTESTINAL NEGATIVE: 1
EYES NEGATIVE: 1
ALLERGIC/IMMUNOLOGIC COMMENTS: REVIEWED AND UPDATED
PSYCHIATRIC NEGATIVE: 1
NEUROLOGICAL NEGATIVE: 1
MUSCULOSKELETAL NEGATIVE: 1
CONSTITUTIONAL NEGATIVE: 1

## 2018-09-05 NOTE — PROGRESS NOTES
"Patient ID: Madonna Tipton   : 1965  MRN: [id number]   Visit Date: 2018    Subjective   Madonna Tipton is presenting today for Follow-up      HPI:  Ms. Tipton is s/p [facility] BS for 18 week  fibroid uterus 18 who reports rob-incisional pain at umbilicus and concern for hernia.  She denies a palpable mass, nausea or vomiting, abdominal or pelvic pain.  Emanuel reports no change in bowel or bladder.  There is no fever or chills.          Vital Signs for this encounter: BP (!) 144/88   Pulse 97   Temp 37.1 °C (98.7 °F) (Oral)   Resp 18   Ht 1.651 m (5' 5\")   Wt 74.6 kg (164 lb 6.4 oz)   LMP 2018   BMI 27.36 kg/m²     Obstetric History:   OB History      Para Term  AB Living    2 1     1 1    SAB TAB Ectopic Multiple Live Births                     Past Medical History:  has a past medical history of Abnormal Pap smear of cervix; Anxiety; Asthma; Chlamydia; H/O mammogram (2017); Palpitations; Rheumatoid arthritis (CMS/HCC) ([facility]); Sensation of pressure in bladder area; and Urinary retention with incomplete bladder emptying.  Past Surgical History:  has a past surgical history that includes Colonoscopy (); Hernia repair (2016); Myomectomy; and Laparoscopic total hysterectomy (N/A, 2018).  Family History: family history includes Breast cancer in her mother; Colon cancer in her maternal grandfather.  Social History:  reports that she has quit smoking. Her smoking use included Cigarettes. She has never used smokeless tobacco. She reports that she drinks about 4.2 oz of alcohol per week . She reports that she does not use drugs.  Medications:   Current Outpatient Prescriptions:   •  montelukast (SINGULAIR) 10 mg tablet, Take 1 tablet by mouth daily., Disp: , Rfl:   •  PROAIR HFA 90 mcg/actuation inhaler, Inhale 2 puffs 4 (four) times a day as needed., Disp: , Rfl: 2  •  sertraline (ZOLOFT) 50 mg tablet, Take 25 mg by mouth daily.  , Disp: , Rfl: 2  •  SYMBICORT " 80-4.5 mcg/actuation inhaler, Inhale 1 puff daily., Disp: , Rfl: 5    Allergies: is allergic to feathers; mold; and shellfish derived.       Review of Systems   Constitutional: Negative.    HENT: Negative.    Eyes: Negative.    Respiratory: Negative.    Cardiovascular: Negative.    Gastrointestinal: Negative.    Endocrine: Negative.    Genitourinary:        As per HPI   Musculoskeletal: Negative.    Skin: Negative.    Allergic/Immunologic:        Reviewed and updated   Neurological: Negative.    Psychiatric/Behavioral: Negative.      Physical Exam   Constitutional: She is oriented to person, place, and time. She appears well-developed and well-nourished.   HENT:   Head: Normocephalic and atraumatic.   Eyes: Pupils are equal, round, and reactive to light.   Cardiovascular: Normal rate.    Pulmonary/Chest: Effort normal. No respiratory distress.   Abdominal: Soft. She exhibits no distension. There is no tenderness.   No abdominal mass or periumbilical bulge with valsalva.  Non tender to palpation.  Laparoscopic incision sies appropriately healing and intact.  No induration, erythema or wound drainage.    Neurological: She is alert and oriented to person, place, and time.   Skin: Skin is warm and dry.   Psychiatric: She has a normal mood and affect.   Nursing note and vitals reviewed.    Assessment/Plan   Problem List Items Addressed This Visit     Status post laparoscopic hysterectomy - Primary      Appropriate wound healing and pain control.  No evidenc of wound infection or hernia.          The following have been marked reviewed and updated as appropriate in this visit:  Allergies  Meds  Problems       Return if symptoms worsen or fail to improve.  Ciro Monroe MD

## 2018-09-19 PROBLEM — E55.9 MILD VITAMIN D DEFICIENCY: Status: ACTIVE | Noted: 2018-09-19

## 2018-09-19 PROBLEM — J45.909 MILD ASTHMA: Status: ACTIVE | Noted: 2018-09-19

## 2018-09-24 ENCOUNTER — OFFICE VISIT (OUTPATIENT)
Dept: PRIMARY CARE | Facility: CLINIC | Age: 53
End: 2018-09-24
Payer: COMMERCIAL

## 2018-09-24 VITALS
SYSTOLIC BLOOD PRESSURE: 128 MMHG | HEART RATE: 110 BPM | WEIGHT: 164 LBS | DIASTOLIC BLOOD PRESSURE: 84 MMHG | BODY MASS INDEX: 27.32 KG/M2 | HEIGHT: 65 IN

## 2018-09-24 DIAGNOSIS — Z23 NEED FOR PROPHYLACTIC VACCINATION AND INOCULATION AGAINST INFLUENZA: ICD-10-CM

## 2018-09-24 DIAGNOSIS — M53.3 COCCYGODYNIA: Primary | ICD-10-CM

## 2018-09-24 PROCEDURE — 90686 IIV4 VACC NO PRSV 0.5 ML IM: CPT | Performed by: INTERNAL MEDICINE

## 2018-09-24 PROCEDURE — 90471 IMMUNIZATION ADMIN: CPT | Performed by: INTERNAL MEDICINE

## 2018-09-24 PROCEDURE — 99213 OFFICE O/P EST LOW 20 MIN: CPT | Mod: 25 | Performed by: INTERNAL MEDICINE

## 2018-09-24 RX ORDER — MELOXICAM 15 MG/1
15 TABLET ORAL DAILY
Qty: 30 TABLET | Refills: 0 | Status: SHIPPED | OUTPATIENT
Start: 2018-09-24 | End: 2018-10-23 | Stop reason: SDUPTHER

## 2018-09-24 RX ORDER — GABAPENTIN 100 MG/1
100 CAPSULE ORAL 3 TIMES DAILY
Qty: 90 CAPSULE | Refills: 0 | Status: SHIPPED | OUTPATIENT
Start: 2018-09-24 | End: 2019-04-01

## 2018-09-24 ASSESSMENT — ENCOUNTER SYMPTOMS
WEAKNESS: 0
BOWEL INCONTINENCE: 0
NUMBNESS: 0
JOINT SWELLING: 0
DIZZINESS: 0
TREMORS: 0
BACK PAIN: 1

## 2018-09-24 NOTE — PROGRESS NOTES
[facility]  Dr. Brenda Agustin  [address]  [place] [address]  Phone: [phone]  Fax: [phone]      Patient ID: Madonna Tipton                              : 1965    Visit Date: 2018    Chief Complaint: Back Pain (c/o tailbone pain starting in February-remembers no injury. )      HPI  Pain in tailbone since 2018.  Had hysterectomy in 2018, for enlarged uterus with the gynecologist oncologist at [facility]. Surgery took 5 hours.Was having problems with having her period q 2 weeks and bladder pressure issues.   The sxs with bleeding and bladder got better but not the coccyx pain. Always worse around the time of her cycle and she still has cyclical feelings even though she has not uterus. Never had low back pain before. Last saw gynecologist, Dr. Toussaint-Foster.   More with sitting back amd relieved with sitting forward. Has not taken any medication to relieve it.      Back Pain   This is a chronic problem. The current episode started more than 1 month ago. The problem occurs constantly. The quality of the pain is described as aching. The pain is moderate. Pertinent negatives include no bladder incontinence, bowel incontinence, numbness, pelvic pain or weakness. She has tried NSAIDs for the symptoms. The treatment provided mild relief.       Subjective      Patient ID: Madonna Tipton is a 53 y.o. female.    Patient Active Problem List   Diagnosis   • Intramural and submucous leiomyoma of uterus   • Submucous and subserous leiomyoma of uterus   • At high risk for breast cancer   • Fibroid (bleeding) (uterine)   • Status post laparoscopic hysterectomy   • Body mass index (BMI) greater than 99th percentile for age in overweight child   • Mild asthma   • Mild vitamin D deficiency         Current Outpatient Prescriptions:   •  montelukast (SINGULAIR) 10 mg tablet, Take 1 tablet by mouth daily., Disp: , Rfl:   •  PROAIR HFA 90  mcg/actuation inhaler, Inhale 2 puffs 4 (four) times a day as needed., Disp: , Rfl: 2  •  sertraline (ZOLOFT) 50 mg tablet, Take 25 mg by mouth daily.  , Disp: , Rfl: 2  •  SYMBICORT 80-4.5 mcg/actuation inhaler, Inhale 1 puff daily., Disp: , Rfl: 5  •  gabapentin (NEURONTIN) 100 mg capsule, Take 1 capsule (100 mg total) by mouth 3 (three) times a day., Disp: 90 capsule, Rfl: 0  •  meloxicam (MOBIC) 15 mg tablet, Take 1 tablet (15 mg total) by mouth daily., Disp: 30 tablet, Rfl: 0    Allergies   Allergen Reactions   • Feathers Shortness of breath   • Mold Shortness of breath   • Shellfish Derived GI intolerance       Social History     Social History   • Marital status:      Spouse name: N/A   • Number of children: N/A   • Years of education: N/A     Occupational History   • Not on file.     Social History Main Topics   • Smoking status: Former Smoker     Types: Cigarettes   • Smokeless tobacco: Never Used   • Alcohol use 4.2 oz/week     7 Glasses of wine per week   • Drug use: No   • Sexual activity: Yes     Partners: Male     Other Topics Concern   • Not on file     Social History Narrative   • No narrative on file       Health Maintenance   Topic Date Due   • Influenza Vaccine (1) 09/01/2018   • Mammogram  05/07/2020   • DTaP, Tdap, and Td Vaccines (2 - Td) 06/27/2022   • Colorectal Cancer Screening  03/31/2027   • HPV Vaccines  Aged Out   • Meningococcal Vaccine  Aged Out   • HIB Vaccines  Aged Out   • IPV Vaccines  Aged Out       Past Medical History:   Diagnosis Date   • Abnormal Pap smear of cervix    • Anxiety    • Asthma    • Body mass index (BMI) greater than 99th percentile for age in overweight child    • Chlamydia    • H/O mammogram 05/2017   • Palpitations    • Personal history of urinary disorder    • Rheumatoid arthritis (CMS/HCC) (HCC)    • Sensation of pressure in bladder area    • Urinary retention with incomplete bladder emptying        The following have been reviewed and updated as  "appropriate in this visit:  Allergies  Meds  Problems         Review of System  Review of Systems   Gastrointestinal: Negative for bowel incontinence.   Genitourinary: Negative for bladder incontinence and pelvic pain.   Musculoskeletal: Positive for back pain. Negative for gait problem and joint swelling.   Neurological: Negative for dizziness, tremors, weakness and numbness.       Objective     Vitals  Vitals:    09/24/18 0824   BP: 128/84   Pulse: (!) 110   Weight: 74.4 kg (164 lb)   Height: 1.657 m (5' 5.25\")       Body mass index is 27.08 kg/m².    Physical Exam  Physical Exam   Constitutional: She is oriented to person, place, and time. She appears well-developed and well-nourished. No distress.   Musculoskeletal: Normal range of motion. She exhibits no edema or deformity.   Neurological: She is alert and oriented to person, place, and time. She has normal strength. She displays normal reflexes. No sensory deficit. She exhibits normal muscle tone. Coordination and gait normal.   Skin: Skin is warm and dry.   Vitals reviewed.      Assessment/Plan     Problem List Items Addressed This Visit     None      Visit Diagnoses     Coccygodynia    -  Primary    Pt will try the Meloxicam for one week and check for pain relief. If persists will begin the gabapentin for one week. Side effects discussed. Avoid other NSAIDs    Relevant Medications    meloxicam (MOBIC) 15 mg tablet    gabapentin (NEURONTIN) 100 mg capsule    Need for prophylactic vaccination and inoculation against influenza        Relevant Orders    Influenza vaccine quadrivalent preservative free 6 mon and older IM (FluLaval) (Completed)      Pt will follow up if sxs fail to clear and further work up will be discussed following further evaluation.    There are no Patient Instructions on file for this visit.    Return in about 4 weeks (around 10/22/2018).      Brenda Agustin MD  9/24/2018      "

## 2018-10-23 DIAGNOSIS — M53.3 COCCYGODYNIA: ICD-10-CM

## 2018-10-23 RX ORDER — MELOXICAM 15 MG/1
TABLET ORAL
Qty: 30 TABLET | Refills: 0 | Status: SHIPPED | OUTPATIENT
Start: 2018-10-23 | End: 2019-04-01

## 2018-11-05 ENCOUNTER — OFFICE VISIT (OUTPATIENT)
Dept: PRIMARY CARE | Facility: CLINIC | Age: 53
End: 2018-11-05
Payer: COMMERCIAL

## 2018-11-05 VITALS
HEART RATE: 98 BPM | HEIGHT: 62 IN | SYSTOLIC BLOOD PRESSURE: 130 MMHG | DIASTOLIC BLOOD PRESSURE: 82 MMHG | BODY MASS INDEX: 30 KG/M2 | WEIGHT: 163 LBS

## 2018-11-05 DIAGNOSIS — M53.3 COCCYODYNIA: Primary | ICD-10-CM

## 2018-11-05 PROCEDURE — 99214 OFFICE O/P EST MOD 30 MIN: CPT | Performed by: INTERNAL MEDICINE

## 2018-11-05 RX ORDER — METHYLPREDNISOLONE 4 MG/1
TABLET ORAL
Qty: 21 TABLET | Refills: 0 | Status: SHIPPED | OUTPATIENT
Start: 2018-11-05 | End: 2019-04-01

## 2018-11-05 ASSESSMENT — ENCOUNTER SYMPTOMS
FEVER: 0
MYALGIAS: 0
ACTIVITY CHANGE: 0
FATIGUE: 0
BACK PAIN: 1

## 2018-11-05 NOTE — PROGRESS NOTES
[facility]  Dr. Brenda Agustin  [address]  [place] [address]  Phone: [phone]  Fax: [phone]      Patient ID: Madonna Tipton                              : 1965    Visit Date: 2018    Chief Complaint: Back Pain (tail bone months no injury)      HPI  Reporting that she tried the meloxicam for her coccyx pain. Admits to being paranoid. Her grandfather had rectal cancer that spread to his tailbone.  Is UTD with colonoscopy.  Was also prescribed another medication, gabapentin, but did not try it yet.  She has taken steroids before for her asthma.  Also reminds examiner that she was diagnosed years ago with seronegative RA that spontaneously went away.      Back Pain   This is a chronic problem. The current episode started more than 1 month ago. The problem occurs constantly. The problem has been gradually worsening since onset. The quality of the pain is described as aching. The pain does not radiate. The pain is moderate. The symptoms are aggravated by sitting. Pertinent negatives include no fever.       Subjective      Patient ID: Madonna Tipton is a 53 y.o. female.    Patient Active Problem List   Diagnosis   • Intramural and submucous leiomyoma of uterus   • Submucous and subserous leiomyoma of uterus   • At high risk for breast cancer   • Fibroid (bleeding) (uterine)   • Status post laparoscopic hysterectomy   • Body mass index (BMI) greater than 99th percentile for age in overweight child   • Mild asthma   • Mild vitamin D deficiency         Current Outpatient Prescriptions:   •  montelukast (SINGULAIR) 10 mg tablet, Take 1 tablet by mouth daily., Disp: , Rfl:   •  PROAIR HFA 90 mcg/actuation inhaler, Inhale 2 puffs 4 (four) times a day as needed., Disp: , Rfl: 2  •  sertraline (ZOLOFT) 50 mg tablet, Take 25 mg by mouth daily.  , Disp: , Rfl: 2  •  SYMBICORT 80-4.5 mcg/actuation inhaler, Inhale 1 puff daily., Disp: , Rfl: 5  •   meloxicam (MOBIC) 15 mg tablet, TAKE 1 TABLET BY MOUTH EVERY DAY (Patient not taking: Reported on 11/5/2018), Disp: 30 tablet, Rfl: 0  •  methylPREDNISolone (MEDROL DOSEPACK) 4 mg tablet, Follow package directions., Disp: 21 tablet, Rfl: 0    Allergies   Allergen Reactions   • Feathers Shortness of breath   • Mold Shortness of breath   • Shellfish Derived GI intolerance       Social History     Social History   • Marital status:      Spouse name: N/A   • Number of children: N/A   • Years of education: N/A     Occupational History   • Not on file.     Social History Main Topics   • Smoking status: Former Smoker     Types: Cigarettes   • Smokeless tobacco: Never Used   • Alcohol use 4.2 oz/week     7 Glasses of wine per week   • Drug use: No   • Sexual activity: Yes     Partners: Male     Other Topics Concern   • Not on file     Social History Narrative   • No narrative on file       Health Maintenance   Topic Date Due   • Breast Cancer Screening  05/07/2020   • DTaP, Tdap, and Td Vaccines (2 - Td) 06/27/2022   • Colonoscopy  03/31/2027   • HPV Vaccines  Aged Out   • Meningococcal Vaccine  Aged Out   • HIB Vaccines  Aged Out   • IPV Vaccines  Aged Out   • Influenza Vaccine  Completed       Past Medical History:   Diagnosis Date   • Abnormal Pap smear of cervix    • Anxiety    • Asthma    • Body mass index (BMI) greater than 99th percentile for age in overweight child    • Chlamydia    • H/O mammogram 05/2017   • Palpitations    • Personal history of urinary disorder    • Rheumatoid arthritis (CMS/HCC) ([facility])    • Sensation of pressure in bladder area    • Urinary retention with incomplete bladder emptying        The following have been reviewed and updated as appropriate in this visit:  Allergies  Meds  Problems         Review of System  Review of Systems   Constitutional: Negative for activity change, fatigue and fever.   Musculoskeletal: Positive for back pain (at the tip of the tailbone). Negative for gait  "problem and myalgias.       Objective     Vitals  Vitals:    11/05/18 0821   BP: 130/82   Pulse: 98   Weight: 73.9 kg (163 lb)   Height: 1.581 m (5' 2.25\")       Body mass index is 29.57 kg/m².    Physical Exam  Physical Exam   Constitutional: She is oriented to person, place, and time. She appears well-developed and well-nourished. No distress.   HENT:   Head: Normocephalic and atraumatic.   Right Ear: External ear normal.   Left Ear: External ear normal.   Nose: Nose normal.   Eyes: Conjunctivae and EOM are normal. Pupils are equal, round, and reactive to light.   Neck: Normal range of motion. Neck supple.   Cardiovascular: Normal rate and regular rhythm.    Pulmonary/Chest: Effort normal. No stridor. No respiratory distress.   Musculoskeletal: Normal range of motion. She exhibits no edema or deformity.   Neurological: She is alert and oriented to person, place, and time. Coordination normal.   Skin: Skin is warm and dry.   Psychiatric: She has a normal mood and affect. Judgment and thought content normal.   Vitals reviewed.      Assessment/Plan     Problem List Items Addressed This Visit     None      Visit Diagnoses     Coccyodynia    -  Primary    Consider gabapentin. Consider pain managment or orthopedist consultation depending on findings of proposed work up.    Relevant Medications    methylPREDNISolone (MEDROL DOSEPACK) 4 mg tablet    Other Relevant Orders    Comprehensive metabolic panel    CBC and differential    TSH    T4, free    Sedimentation rate, automated    X-RAY SACRUM AND COCCYX          There are no Patient Instructions on file for this visit.    Return if symptoms worsen or fail to improve.      Brenda Agustin MD  11/5/2018      "

## 2018-11-06 ENCOUNTER — TELEPHONE (OUTPATIENT)
Dept: PRIMARY CARE | Facility: CLINIC | Age: 53
End: 2018-11-06

## 2018-11-06 LAB
ALBUMIN SERPL-MCNC: 4.7 G/DL (ref 3.5–5.5)
ALBUMIN/GLOB SERPL: 1.5 {RATIO} (ref 1.2–2.2)
ALP SERPL-CCNC: 45 IU/L (ref 39–117)
ALT SERPL-CCNC: 17 IU/L (ref 0–32)
AST SERPL-CCNC: 18 IU/L (ref 0–40)
BASOPHILS # BLD AUTO: 0 X10E3/UL (ref 0–0.2)
BASOPHILS NFR BLD AUTO: 1 %
BILIRUB SERPL-MCNC: 0.4 MG/DL (ref 0–1.2)
BUN SERPL-MCNC: 15 MG/DL (ref 6–24)
BUN/CREAT SERPL: 21 (ref 9–23)
CALCIUM SERPL-MCNC: 9.6 MG/DL (ref 8.7–10.2)
CHLORIDE SERPL-SCNC: 102 MMOL/L (ref 96–106)
CO2 SERPL-SCNC: 22 MMOL/L (ref 20–29)
CREAT SERPL-MCNC: 0.72 MG/DL (ref 0.57–1)
EOSINOPHIL # BLD AUTO: 0.1 X10E3/UL (ref 0–0.4)
EOSINOPHIL NFR BLD AUTO: 3 %
ERYTHROCYTE [DISTWIDTH] IN BLOOD BY AUTOMATED COUNT: 15.4 % (ref 12.3–15.4)
ERYTHROCYTE [SEDIMENTATION RATE] IN BLOOD BY WESTERGREN METHOD: 6 MM/HR (ref 0–40)
GLOBULIN SER CALC-MCNC: 3.2 G/DL (ref 1.5–4.5)
GLUCOSE SERPL-MCNC: 113 MG/DL (ref 65–99)
HCT VFR BLD AUTO: 43.5 % (ref 34–46.6)
HGB BLD-MCNC: 14.8 G/DL (ref 11.1–15.9)
IMM GRANULOCYTES # BLD: 0 X10E3/UL (ref 0–0.1)
IMM GRANULOCYTES NFR BLD: 0 %
LAB CORP EGFR IF AFRICN AM: 111 ML/MIN/1.73
LAB CORP EGFR IF NONAFRICN AM: 97 ML/MIN/1.73
LYMPHOCYTES # BLD AUTO: 1.4 X10E3/UL (ref 0.7–3.1)
LYMPHOCYTES NFR BLD AUTO: 29 %
MCH RBC QN AUTO: 30.1 PG (ref 26.6–33)
MCHC RBC AUTO-ENTMCNC: 34 G/DL (ref 31.5–35.7)
MCV RBC AUTO: 88 FL (ref 79–97)
MONOCYTES # BLD AUTO: 0.5 X10E3/UL (ref 0.1–0.9)
MONOCYTES NFR BLD AUTO: 10 %
NEUTROPHILS # BLD AUTO: 2.8 X10E3/UL (ref 1.4–7)
NEUTROPHILS NFR BLD AUTO: 57 %
PLATELET # BLD AUTO: 275 X10E3/UL (ref 150–379)
POTASSIUM SERPL-SCNC: 4.3 MMOL/L (ref 3.5–5.2)
PROT SERPL-MCNC: 7.9 G/DL (ref 6–8.5)
RBC # BLD AUTO: 4.92 X10E6/UL (ref 3.77–5.28)
SODIUM SERPL-SCNC: 140 MMOL/L (ref 134–144)
T4 FREE SERPL-MCNC: 1.16 NG/DL (ref 0.82–1.77)
TSH SERPL DL<=0.005 MIU/L-ACNC: 1.18 UIU/ML (ref 0.45–4.5)
WBC # BLD AUTO: 4.8 X10E3/UL (ref 3.4–10.8)

## 2018-11-06 NOTE — TELEPHONE ENCOUNTER
----- Message from Brenda Agustin MD sent at 11/6/2018  1:24 PM EST -----  Please advise the pt all her recent labs were normal except for slight elevation of blood sugar. Not certain she was fasting. Thx

## 2018-12-03 ENCOUNTER — HOSPITAL ENCOUNTER (OUTPATIENT)
Dept: RADIOLOGY | Facility: HOSPITAL | Age: 53
Discharge: HOME | End: 2018-12-03
Attending: SURGERY
Payer: COMMERCIAL

## 2018-12-03 DIAGNOSIS — Z91.89 AT HIGH RISK FOR BREAST CANCER: ICD-10-CM

## 2018-12-03 RX ORDER — GADOBUTROL 604.72 MG/ML
7 INJECTION INTRAVENOUS ONCE
Status: COMPLETED | OUTPATIENT
Start: 2018-12-03 | End: 2018-12-03

## 2018-12-03 RX ADMIN — GADOBUTROL 7 ML: 604.72 INJECTION INTRAVENOUS at 08:15

## 2018-12-04 DIAGNOSIS — Z12.31 ENCOUNTER FOR SCREENING MAMMOGRAM FOR HIGH-RISK PATIENT: ICD-10-CM

## 2018-12-04 DIAGNOSIS — R92.30 DENSE BREAST TISSUE: ICD-10-CM

## 2018-12-04 DIAGNOSIS — Z80.3 FAMILY HISTORY OF MALIGNANT NEOPLASM OF BREAST: Primary | ICD-10-CM

## 2018-12-04 NOTE — PROGRESS NOTES
Spoke to Madonna on the phone and reviewed her MRI results. All questions answered. She will be due for her annual mammogram in May. She requested to have the Rx mailed. I wrote and printed out the screening mammo to be mailed. Lior Gallo, MSN, CRNP

## 2018-12-19 RX ORDER — MONTELUKAST SODIUM 10 MG/1
TABLET ORAL
Qty: 30 TABLET | Refills: 3 | Status: SHIPPED | OUTPATIENT
Start: 2018-12-19 | End: 2019-05-13 | Stop reason: SDUPTHER

## 2019-01-15 RX ORDER — DILTIAZEM HYDROCHLORIDE 60 MG/1
TABLET, FILM COATED ORAL
Qty: 10.2 INHALER | Refills: 2 | Status: SHIPPED | OUTPATIENT
Start: 2019-01-15 | End: 2019-09-04 | Stop reason: SDUPTHER

## 2019-02-11 ENCOUNTER — OFFICE VISIT (OUTPATIENT)
Dept: PRIMARY CARE | Facility: CLINIC | Age: 54
End: 2019-02-11
Payer: COMMERCIAL

## 2019-02-11 VITALS
WEIGHT: 159 LBS | SYSTOLIC BLOOD PRESSURE: 130 MMHG | BODY MASS INDEX: 29.26 KG/M2 | HEIGHT: 62 IN | DIASTOLIC BLOOD PRESSURE: 80 MMHG | HEART RATE: 80 BPM

## 2019-02-11 DIAGNOSIS — M53.3 COCCYDYNIA: Primary | ICD-10-CM

## 2019-02-11 PROCEDURE — 99213 OFFICE O/P EST LOW 20 MIN: CPT | Performed by: INTERNAL MEDICINE

## 2019-02-11 RX ORDER — SERTRALINE HYDROCHLORIDE 50 MG/1
TABLET, FILM COATED ORAL
Qty: 30 TABLET | Refills: 2 | Status: SHIPPED | OUTPATIENT
Start: 2019-02-11 | End: 2019-05-12 | Stop reason: SDUPTHER

## 2019-02-11 ASSESSMENT — ENCOUNTER SYMPTOMS
FEVER: 0
FATIGUE: 0
ARTHRALGIAS: 0
ACTIVITY CHANGE: 0
BACK PAIN: 0
PALPITATIONS: 0
UNEXPECTED WEIGHT CHANGE: 0
WEAKNESS: 0
TREMORS: 0
SHORTNESS OF BREATH: 0
DIZZINESS: 0

## 2019-02-11 ASSESSMENT — PAIN SCALES - GENERAL: PAINLEVEL: 6

## 2019-02-11 NOTE — PROGRESS NOTES
[facility]  Dr. Brenda Agustin  [address]  [place] [address]  Phone: [phone]  Fax: [phone]      Patient ID: Madonna Tipton                              : 1965    Visit Date: 2019    Chief Complaint: Tailbone Pain (off and on since November)      HPI  Tailbone pain has persisted.  Not getting better. Worries that is could be something internal.  Progressively worse.  Had gynecologist f/u and hysterectomy in the meantime.  performed a laparoscopic hysterectomy.  Worst with sitting and leaning forward to stand.   Meloxicam helped some.  Never took the steroid that was recommended months ago.  Thinking of seeing a chiropractor.        Subjective      Patient ID: Madonna Tipton is a 53 y.o. female.    Patient Active Problem List   Diagnosis   • Intramural and submucous leiomyoma of uterus   • Submucous and subserous leiomyoma of uterus   • At high risk for breast cancer   • Fibroid (bleeding) (uterine)   • Status post laparoscopic hysterectomy   • Body mass index (BMI) greater than 99th percentile for age in overweight child   • Mild asthma   • Mild vitamin D deficiency         Current Outpatient Prescriptions:   •  montelukast (SINGULAIR) 10 mg tablet, TAKE 1 TABLET BY MOUTH EVERY DAY, Disp: 30 tablet, Rfl: 3  •  PROAIR HFA 90 mcg/actuation inhaler, Inhale 2 puffs 4 (four) times a day as needed., Disp: , Rfl: 2  •  SYMBICORT 80-4.5 mcg/actuation inhaler, TAKE 1 PUFF TWICE A DAY, Disp: 10.2 Inhaler, Rfl: 2  •  meloxicam (MOBIC) 15 mg tablet, TAKE 1 TABLET BY MOUTH EVERY DAY (Patient not taking: Reported on 2018), Disp: 30 tablet, Rfl: 0  •  sertraline (ZOLOFT) 50 mg tablet, TAKE 1 TABLET BY MOUTH EVERY DAY, Disp: 30 tablet, Rfl: 2    Allergies   Allergen Reactions   • Feathers Shortness of breath   • Mold Shortness of breath   • Shellfish Derived GI intolerance       Social History     Social History   • Marital status:  "     Spouse name: N/A   • Number of children: N/A   • Years of education: N/A     Occupational History   • Not on file.     Social History Main Topics   • Smoking status: Former Smoker     Types: Cigarettes   • Smokeless tobacco: Never Used   • Alcohol use 4.2 oz/week     7 Glasses of wine per week   • Drug use: No   • Sexual activity: Yes     Partners: Male     Other Topics Concern   • Not on file     Social History Narrative   • No narrative on file       Health Maintenance   Topic Date Due   • Mammogram  05/07/2020   • DTaP, Tdap, and Td Vaccines (2 - Td) 06/27/2022   • Colonoscopy  03/31/2027   • HPV Vaccines  Aged Out   • Meningococcal Vaccine  Aged Out   • HIB Vaccines  Aged Out   • IPV Vaccines  Aged Out   • Influenza Vaccine  Completed       Past Medical History:   Diagnosis Date   • Abnormal Pap smear of cervix    • Anxiety    • Asthma    • Body mass index (BMI) greater than 99th percentile for age in overweight child    • Chlamydia    • H/O mammogram 05/2017   • Palpitations    • Personal history of urinary disorder    • Rheumatoid arthritis (CMS/HCC) ([facility])    • Sensation of pressure in bladder area    • Urinary retention with incomplete bladder emptying        The following have been reviewed and updated as appropriate in this visit:  Allergies  Meds  Problems         Review of System  Review of Systems   Constitutional: Negative for activity change, fatigue, fever and unexpected weight change.   HENT: Negative for congestion.    Respiratory: Negative for shortness of breath.    Cardiovascular: Negative for chest pain and palpitations.   Musculoskeletal: Negative for arthralgias, back pain and gait problem.   Neurological: Negative for dizziness, tremors and weakness.       Objective     Vitals  Vitals:    02/11/19 0951   BP: 130/80   Pulse: 80   Weight: 72.1 kg (159 lb)   Height: 1.581 m (5' 2.25\")       Body mass index is 28.85 kg/m².    Physical Exam  Physical Exam   Constitutional: She is " oriented to person, place, and time. She appears well-nourished. No distress.   HENT:   Head: Normocephalic.   Nose: Nose normal.   Mouth/Throat: Oropharynx is clear and moist.   Eyes: Conjunctivae and EOM are normal. Pupils are equal, round, and reactive to light.   Neck: Normal range of motion. Neck supple.   Cardiovascular: Normal rate, regular rhythm and normal heart sounds.    Pulmonary/Chest: Effort normal and breath sounds normal.   Abdominal: Soft. Bowel sounds are normal. She exhibits no mass. There is no tenderness.   Musculoskeletal: Normal range of motion. She exhibits no edema, tenderness or deformity.   Lymphadenopathy:     She has no cervical adenopathy.   Neurological: She is alert and oriented to person, place, and time. Coordination normal.   Skin: Skin is warm and dry.   Psychiatric: She has a normal mood and affect. Thought content normal.   Vitals reviewed.  Labs incl EST, TFTs, CBC, CMP 11/5/18 normal except     Assessment/Plan     Problem List Items Addressed This Visit     None      Visit Diagnoses     Coccydynia    -  Primary    Believe the gynecologist may have seen a signifciant internal abnormality on laparoscopy so will look at any donna or soft tissue abnormality at the coccyx.    Relevant Orders    Ambulatory referral to Pain Medicine    FIT    MRI LUMBAR SPINE WITHOUT CONTRAST      Reviewed in detail the medical history over the past year and the labs and surgery..      There are no Patient Instructions on file for this visit.    No Follow-up on file.      Brenda Agustin MD  2/11/2019

## 2019-02-19 DIAGNOSIS — M53.3 COCCYDYNIA: Primary | ICD-10-CM

## 2019-02-22 ENCOUNTER — HOSPITAL ENCOUNTER (OUTPATIENT)
Dept: RADIOLOGY | Facility: HOSPITAL | Age: 54
Discharge: HOME | End: 2019-02-22
Attending: NURSE PRACTITIONER
Payer: COMMERCIAL

## 2019-02-22 DIAGNOSIS — M53.3 COCCYDYNIA: ICD-10-CM

## 2019-02-25 NOTE — PROGRESS NOTES
Staff, Please advise the pt that no bone abnormalities were seen on the MRI and no soft tissue or internal abnormalities at the region of her complaint. There is an ovarian cyst present on the left. Please have the pt give the name of her gynecologist so we can forward the report. I did not check to see if we do her gynecology exams. Thx

## 2019-02-27 LAB
HEMOCCULT STL QL IA: NEGATIVE
SPECIMEN STATUS: NORMAL

## 2019-03-06 ENCOUNTER — OFFICE VISIT (OUTPATIENT)
Dept: GYNECOLOGIC ONCOLOGY | Facility: CLINIC | Age: 54
End: 2019-03-06
Attending: OBSTETRICS & GYNECOLOGY
Payer: COMMERCIAL

## 2019-03-06 VITALS
HEART RATE: 103 BPM | DIASTOLIC BLOOD PRESSURE: 90 MMHG | BODY MASS INDEX: 26.16 KG/M2 | SYSTOLIC BLOOD PRESSURE: 149 MMHG | WEIGHT: 157 LBS | HEIGHT: 65 IN

## 2019-03-06 DIAGNOSIS — N63.0 BREAST MASS: ICD-10-CM

## 2019-03-06 DIAGNOSIS — Z01.419 WELL WOMAN EXAM WITH ROUTINE GYNECOLOGICAL EXAM: ICD-10-CM

## 2019-03-06 DIAGNOSIS — Z01.411 ENCOUNTER FOR GYNECOLOGICAL EXAMINATION WITH ABNORMAL FINDING: Primary | ICD-10-CM

## 2019-03-06 DIAGNOSIS — N83.202 OVARIAN CYST, LEFT: ICD-10-CM

## 2019-03-06 DIAGNOSIS — N63.20 BREAST MASS, LEFT: ICD-10-CM

## 2019-03-06 PROCEDURE — S0612 ANNUAL GYNECOLOGICAL EXAMINA: HCPCS | Performed by: OBSTETRICS & GYNECOLOGY

## 2019-03-06 ASSESSMENT — ENCOUNTER SYMPTOMS
ALLERGIC/IMMUNOLOGIC COMMENTS: REVIEWED AND UPDATED
EYES NEGATIVE: 1
CONSTITUTIONAL NEGATIVE: 1
NAUSEA: 0
APPETITE CHANGE: 0
PSYCHIATRIC NEGATIVE: 1
FATIGUE: 0
PALPITATIONS: 0
CONSTIPATION: 0
DIZZINESS: 0
WEAKNESS: 0
BACK PAIN: 1
DIAPHORESIS: 0
FREQUENCY: 0
ENDOCRINE NEGATIVE: 1
RECTAL PAIN: 0
NOCTURNAL DYSPNEA: 0
GASTROINTESTINAL NEGATIVE: 1
VOMITING: 0
NEUROLOGICAL NEGATIVE: 1
ACTIVITY CHANGE: 0
CHILLS: 0
ABDOMINAL DISTENTION: 0
CARDIOVASCULAR NEGATIVE: 1
BLOOD IN STOOL: 0
UNEXPECTED WEIGHT CHANGE: 0
FEVER: 0
DYSURIA: 0
ABDOMINAL PAIN: 0
RESPIRATORY NEGATIVE: 1
BREAST PAIN: 0
BREAST MASS: 0
DIARRHEA: 0
DIFFICULTY URINATING: 0
ANAL BLEEDING: 0

## 2019-03-06 NOTE — LETTER
"March 6, 2019     Brenda Agustin MD  [address]  [address]  [facility] [address]    Patient: Madonna Tipton   YOB: 1965   Date of Visit: 3/6/2019       Dear Dr. Agustin:    Thank you for referring Madonna Tipton to me for evaluation. Below are my notes for this consultation.    If you have questions, please do not hesitate to call me. I look forward to following your patient along with you.         Sincerely,          Ciro Monroe MD        CC: No Recipients      Visit Date: 3/6/2019   Madonna Tipton is 53 y.o. female presenting today for Gynecologic Exam    The following have been reviewed and updated as appropriate in this visit: Tobacco  Allergies  Meds  Problems  Med Hx  Surg Hx  Fam Hx  Soc Hx        BP (!) 149/90 (BP Location: Left upper arm, Patient Position: Sitting)   Pulse (!) 103   Ht 1.651 m (5' 5\")   Wt 71.2 kg (157 lb)   LMP 05/31/2018   BMI 26.13 kg/m²         Patient's last menstrual period was 05/31/2018.       Medications:   Current Outpatient Prescriptions:   •  meloxicam (MOBIC) 15 mg tablet, TAKE 1 TABLET BY MOUTH EVERY DAY, Disp: 30 tablet, Rfl: 0  •  montelukast (SINGULAIR) 10 mg tablet, TAKE 1 TABLET BY MOUTH EVERY DAY, Disp: 30 tablet, Rfl: 3  •  PROAIR HFA 90 mcg/actuation inhaler, Inhale 2 puffs 4 (four) times a day as needed., Disp: , Rfl: 2  •  sertraline (ZOLOFT) 50 mg tablet, TAKE 1 TABLET BY MOUTH EVERY DAY, Disp: 30 tablet, Rfl: 2  •  SYMBICORT 80-4.5 mcg/actuation inhaler, TAKE 1 PUFF TWICE A DAY, Disp: 10.2 Inhaler, Rfl: 2    Allergies: is allergic to feathers; mold; and shellfish derived.     Past Medical History:  has a past medical history of Abnormal Pap smear of cervix; Anxiety; Asthma; Body mass index (BMI) greater than 99th percentile for age in overweight child; Chlamydia; H/O mammogram (05/2017); Palpitations; Personal history of urinary disorder; Rheumatoid arthritis (CMS/HCC) ([facility]); Sensation of pressure in bladder " area; and Urinary retention with incomplete bladder emptying.  Past Surgical History:  has a past surgical history that includes Colonoscopy (2016); Hernia repair (12/2016); Myomectomy; and Laparoscopic total hysterectomy (N/A, 06/28/2018).  Family History: family history includes Breast cancer in her mother; COPD in her maternal grandmother; Colon cancer in her maternal grandfather.  Social History:  reports that she has quit smoking. Her smoking use included Cigarettes. She has never used smokeless tobacco. She reports that she drinks about 4.2 oz of alcohol per week . She reports that she does not use drugs.  The patient is sexually active. GYN screening history: last pap: was normal. Domestic violence: no.     Ms. Tipton is a 53 year old post-menopasual female here today for annual GYN exam. She has a surgical history of a [facility] BS for 18 week  fibroid uterus on 6/28/18. She reports she is doing well. She does recently report that she had an MRI of breasts on 12/2018 with follow up with Dr. Herring. She reports that the mri was benign. She also reports that she has been having complaints of low back pain x 1 year. She followed up with Jennifer Boyce who ordered MRI and an incidental finding of a left ovarian cyst. She was told to follow up with GYN for further eval. Patient is aware that she has had a hx of left ovarian cyst that was noted on pelvic ultrasound 03/2018 and found to be roughly 3cm and is now found to be roughly 2cm(ct scan). She denies any associated symptoms such as pelvic pressure, abd/pelvic pain/bloating, urinary urgency, urinary frequency, vaginal bleeding. She otherwise is doing well and reports no other problems or concerns at todays visit.          Review of Systems   Constitutional: Negative.  Negative for activity change, appetite change, chills, diaphoresis, fatigue, fever and unexpected weight change.   HENT: Negative.    Eyes: Negative.    Respiratory: Negative.    Cardiovascular:  Negative.  Negative for chest pain, nocturnal dyspnea, palpitations and leg swelling.   Gastrointestinal: Negative.  Negative for abdominal distention, abdominal pain, anal bleeding, blood in stool, change in stool, constipation, diarrhea, nausea, rectal pain and vomiting.   Endocrine: Negative.    Genitourinary: Negative for decreased urine volume, difficulty urinating, dyspareunia, dysuria, frequency, pain with intercourse, pelvic pain, sexual dysfunction, urgency, vaginal bleeding, vaginal discharge, vaginal itching and vaginal pain.        As per HPI   Breast: Negative for breast discharge, breast mass and breast pain.   Musculoskeletal: Positive for back pain (x 1year low back pain- MRI to review).   Skin: Negative.    Allergic/Immunologic:        Reviewed and updated   Neurological: Negative.  Negative for dizziness and weakness.   Psychiatric/Behavioral: Negative.      Physical Exam   Constitutional: She is oriented to person, place, and time. She appears well-developed and well-nourished.   Genitourinary: Vagina normal. Pelvic exam was performed with patient in lithotomy exam position.     External female genitalia normal.   Urethral meatus normal.   Urethra normal.   Normal bladder. No bladder tenderness.   Vagina normal. Vagina exhibits no lesion. No vaginal discharge found. There is no vaginal vault prolapse, cystocele or rectocele present. The cervix is absent.   Uterus is absent.   Adnexa normal.   Right adnexum displays tenderness. Right adnexum does not display mass. Left adnexum does not display mass and does not display tenderness.   HENT:   Head: Normocephalic.   Mouth/Throat: Oropharynx is clear and moist.   Neck: Neck supple. No thyromegaly present.   Cardiovascular: Normal rate.    Pulmonary/Chest: Effort normal. Right breast exhibits no mass, no nipple discharge and no skin change. Left breast exhibits mass (firm mobile , non tender mass 1x2 cm.). Left breast exhibits no nipple discharge and no  skin change.       Abdominal: Soft. She exhibits no distension and no mass. There is no tenderness. There is no rebound.   Musculoskeletal: She exhibits no deformity.   Lymphadenopathy:     She has no cervical adenopathy.     She has no axillary adenopathy.   Neurological: She is alert and oriented to person, place, and time.   Skin: Skin is warm and dry.   Nursing note and vitals reviewed.    Problem List Items Addressed This Visit     Well woman exam with routine gynecological exam     Saeed Barfield is doing well today. We ask that she follows up with another pelvic ultrasound in 4 weeks to further evaluate left ovarian cyst. Patient is aware to follow up for results. Discussed healthy diet and regular exercise. Discussed proper supplementation of vitamin c and d.         Breast mass      Other Visit Diagnoses     Encounter for gynecological examination with abnormal finding    -  Primary    Breast mass, left        Relevant Orders    BI DIAGNOSTIC MAMMOGRAM LEFT    Ovarian cyst, left        Follow-up ultrasound      Relevant Orders    ULTRASOUND PELVIS COMPLETE TRANSABDOMINAL ONLY          Return in about 1 year (around 3/6/2020), or if symptoms worsen or fail to improve, for Annual GYN Exam or sooner.  Ciro Monroe MD

## 2019-03-06 NOTE — PROGRESS NOTES
"  Visit Date: 3/6/2019   Madonna Tipton is 53 y.o. female presenting today for Gynecologic Exam    The following have been reviewed and updated as appropriate in this visit: Tobacco  Allergies  Meds  Problems  Med Hx  Surg Hx  Fam Hx  Soc Hx        BP (!) 149/90 (BP Location: Left upper arm, Patient Position: Sitting)   Pulse (!) 103   Ht 1.651 m (5' 5\")   Wt 71.2 kg (157 lb)   LMP 05/31/2018   BMI 26.13 kg/m²        Patient's last menstrual period was 05/31/2018.       Medications:   Current Outpatient Prescriptions:   •  meloxicam (MOBIC) 15 mg tablet, TAKE 1 TABLET BY MOUTH EVERY DAY, Disp: 30 tablet, Rfl: 0  •  montelukast (SINGULAIR) 10 mg tablet, TAKE 1 TABLET BY MOUTH EVERY DAY, Disp: 30 tablet, Rfl: 3  •  PROAIR HFA 90 mcg/actuation inhaler, Inhale 2 puffs 4 (four) times a day as needed., Disp: , Rfl: 2  •  sertraline (ZOLOFT) 50 mg tablet, TAKE 1 TABLET BY MOUTH EVERY DAY, Disp: 30 tablet, Rfl: 2  •  SYMBICORT 80-4.5 mcg/actuation inhaler, TAKE 1 PUFF TWICE A DAY, Disp: 10.2 Inhaler, Rfl: 2    Allergies: is allergic to feathers; mold; and shellfish derived.     Past Medical History:  has a past medical history of Abnormal Pap smear of cervix; Anxiety; Asthma; Body mass index (BMI) greater than 99th percentile for age in overweight child; Chlamydia; H/O mammogram (05/2017); Palpitations; Personal history of urinary disorder; Rheumatoid arthritis (CMS/HCC) (HCC); Sensation of pressure in bladder area; and Urinary retention with incomplete bladder emptying.  Past Surgical History:  has a past surgical history that includes Colonoscopy (2016); Hernia repair (12/2016); Myomectomy; and Laparoscopic total hysterectomy (N/A, 06/28/2018).  Family History: family history includes Breast cancer in her mother; COPD in her maternal grandmother; Colon cancer in her maternal grandfather.  Social History:  reports that she has quit smoking. Her smoking use included Cigarettes. She has never used smokeless " tobacco. She reports that she drinks about 4.2 oz of alcohol per week . She reports that she does not use drugs.  The patient is sexually active. GYN screening history: last pap: was normal. Domestic violence: no.     Ms. Tipton is a 53 year old post-menopasual female here today for annual GYN exam. She has a surgical history of a [facility] BS for 18 week  fibroid uterus on 6/28/18. She reports she is doing well. She does recently report that she had an MRI of breasts on 12/2018 with follow up with Dr. Herring. She reports that the mri was benign. She also reports that she has been having complaints of low back pain x 1 year. She followed up with Jennifer Boyce who ordered MRI and an incidental finding of a left ovarian cyst. She was told to follow up with GYN for further eval. Patient is aware that she has had a hx of left ovarian cyst that was noted on pelvic ultrasound 03/2018 and found to be roughly 3cm and is now found to be roughly 2cm(ct scan). She denies any associated symptoms such as pelvic pressure, abd/pelvic pain/bloating, urinary urgency, urinary frequency, vaginal bleeding. She otherwise is doing well and reports no other problems or concerns at todays visit.          Review of Systems   Constitutional: Negative.  Negative for activity change, appetite change, chills, diaphoresis, fatigue, fever and unexpected weight change.   HENT: Negative.    Eyes: Negative.    Respiratory: Negative.    Cardiovascular: Negative.  Negative for chest pain, nocturnal dyspnea, palpitations and leg swelling.   Gastrointestinal: Negative.  Negative for abdominal distention, abdominal pain, anal bleeding, blood in stool, change in stool, constipation, diarrhea, nausea, rectal pain and vomiting.   Endocrine: Negative.    Genitourinary: Negative for decreased urine volume, difficulty urinating, dyspareunia, dysuria, frequency, pain with intercourse, pelvic pain, sexual dysfunction, urgency, vaginal bleeding, vaginal discharge,  vaginal itching and vaginal pain.        As per HPI   Breast: Negative for breast discharge, breast mass and breast pain.   Musculoskeletal: Positive for back pain (x 1year low back pain- MRI to review).   Skin: Negative.    Allergic/Immunologic:        Reviewed and updated   Neurological: Negative.  Negative for dizziness and weakness.   Psychiatric/Behavioral: Negative.      Physical Exam   Constitutional: She is oriented to person, place, and time. She appears well-developed and well-nourished.   Genitourinary: Vagina normal. Pelvic exam was performed with patient in lithotomy exam position.     External female genitalia normal.   Urethral meatus normal.   Urethra normal.   Normal bladder. No bladder tenderness.   Vagina normal. Vagina exhibits no lesion. No vaginal discharge found. There is no vaginal vault prolapse, cystocele or rectocele present. The cervix is absent.   Uterus is absent.   Adnexa normal.   Right adnexum displays tenderness. Right adnexum does not display mass. Left adnexum does not display mass and does not display tenderness.   HENT:   Head: Normocephalic.   Mouth/Throat: Oropharynx is clear and moist.   Neck: Neck supple. No thyromegaly present.   Cardiovascular: Normal rate.    Pulmonary/Chest: Effort normal. Right breast exhibits no mass, no nipple discharge and no skin change. Left breast exhibits mass (firm mobile , non tender mass 1x2 cm.). Left breast exhibits no nipple discharge and no skin change.       Abdominal: Soft. She exhibits no distension and no mass. There is no tenderness. There is no rebound.   Musculoskeletal: She exhibits no deformity.   Lymphadenopathy:     She has no cervical adenopathy.     She has no axillary adenopathy.   Neurological: She is alert and oriented to person, place, and time.   Skin: Skin is warm and dry.   Nursing note and vitals reviewed.    Problem List Items Addressed This Visit     Well woman exam with routine gynecological exam     Saeed Barfield is  doing well today. We ask that she follows up with another pelvic ultrasound in 4 weeks to further evaluate left ovarian cyst. Patient is aware to follow up for results. Discussed healthy diet and regular exercise. Discussed proper supplementation of vitamin c and d.         Breast mass      Other Visit Diagnoses     Encounter for gynecological examination with abnormal finding    -  Primary    Breast mass, left        Relevant Orders    BI DIAGNOSTIC MAMMOGRAM LEFT    Ovarian cyst, left        Follow-up ultrasound      Relevant Orders    ULTRASOUND PELVIS COMPLETE TRANSABDOMINAL ONLY          Return in about 1 year (around 3/6/2020), or if symptoms worsen or fail to improve, for Annual GYN Exam or sooner.  Ciro Monroe MD

## 2019-03-06 NOTE — ASSESSMENT & PLAN NOTE
Saeed Barfield is doing well today. We ask that she follows up with another pelvic ultrasound in 4 weeks to further evaluate left ovarian cyst. Patient is aware to follow up for results. Discussed healthy diet and regular exercise. Discussed proper supplementation of vitamin c and d.

## 2019-03-18 ENCOUNTER — HOSPITAL ENCOUNTER (OUTPATIENT)
Dept: RADIOLOGY | Age: 54
Discharge: HOME | End: 2019-03-18
Attending: RADIOLOGY
Payer: COMMERCIAL

## 2019-03-18 ENCOUNTER — HOSPITAL ENCOUNTER (OUTPATIENT)
Dept: RADIOLOGY | Age: 54
Discharge: HOME | End: 2019-03-18
Attending: PHYSICIAN ASSISTANT
Payer: COMMERCIAL

## 2019-03-18 DIAGNOSIS — N63.20 BREAST MASS, LEFT: ICD-10-CM

## 2019-03-18 PROCEDURE — 76642 ULTRASOUND BREAST LIMITED: CPT | Mod: LT

## 2019-03-18 PROCEDURE — G0279 TOMOSYNTHESIS, MAMMO: HCPCS | Mod: LT

## 2019-03-19 ENCOUNTER — TELEPHONE (OUTPATIENT)
Dept: GYNECOLOGIC ONCOLOGY | Facility: CLINIC | Age: 54
End: 2019-03-19

## 2019-03-19 NOTE — TELEPHONE ENCOUNTER
Called patient with normal mammogram results. Patient due back for routine mammo in may 2019. Patient to follow up if she feels any lumps/masses.    Keke Botello PA-C

## 2019-03-19 NOTE — TELEPHONE ENCOUNTER
Also called patient to notify her that if she has any concerns with mass/lumps to follow up with Dr. Herring in that regard.    Keke Botello PA-c

## 2019-04-01 ENCOUNTER — OFFICE VISIT (OUTPATIENT)
Dept: PRIMARY CARE | Facility: CLINIC | Age: 54
End: 2019-04-01
Payer: COMMERCIAL

## 2019-04-01 ENCOUNTER — TRANSCRIBE ORDERS (OUTPATIENT)
Dept: SCHEDULING | Age: 54
End: 2019-04-01

## 2019-04-01 VITALS
HEIGHT: 65 IN | TEMPERATURE: 98.2 F | SYSTOLIC BLOOD PRESSURE: 120 MMHG | DIASTOLIC BLOOD PRESSURE: 80 MMHG | BODY MASS INDEX: 26.33 KG/M2 | WEIGHT: 158 LBS | OXYGEN SATURATION: 99 % | HEART RATE: 90 BPM

## 2019-04-01 DIAGNOSIS — J45.909 MILD ASTHMA, UNSPECIFIED WHETHER COMPLICATED, UNSPECIFIED WHETHER PERSISTENT: ICD-10-CM

## 2019-04-01 DIAGNOSIS — H65.03 BILATERAL ACUTE SEROUS OTITIS MEDIA, RECURRENCE NOT SPECIFIED: Primary | ICD-10-CM

## 2019-04-01 DIAGNOSIS — J06.9 UPPER RESPIRATORY TRACT INFECTION, UNSPECIFIED TYPE: ICD-10-CM

## 2019-04-01 PROCEDURE — 99213 OFFICE O/P EST LOW 20 MIN: CPT | Performed by: INTERNAL MEDICINE

## 2019-04-01 RX ORDER — FLUTICASONE PROPIONATE 50 MCG
1 SPRAY, SUSPENSION (ML) NASAL DAILY
Qty: 1 BOTTLE | Refills: 1 | Status: SHIPPED | OUTPATIENT
Start: 2019-04-01 | End: 2020-10-06 | Stop reason: ALTCHOICE

## 2019-04-01 RX ORDER — CEFDINIR 300 MG/1
CAPSULE ORAL
Refills: 0 | COMMUNITY
Start: 2019-03-24 | End: 2019-08-28

## 2019-04-01 ASSESSMENT — ENCOUNTER SYMPTOMS
SINUS PAIN: 1
NECK PAIN: 0
COUGH: 1
SWOLLEN GLANDS: 0
RHINORRHEA: 0
SORE THROAT: 1

## 2019-04-01 NOTE — PROGRESS NOTES
[facility]  Dr. Valentine Patel  [address], [address]  [place] [address]  Phone: [phone]  Fax: [phone]      Patient ID: Madonna Tipton                              : 1965    Visit Date: 2019    Chief Complaint: URI (Sick for 2 weeks, went to Urgent Care for pink eye, currently on Omnicef/ Cephalexin, now also with right ear pain and sore throat)      Patient ID: Madonna Tipton is a 53 y.o. female.    HPI  Has had a cold for 2 weeks  Got pink eye last week - 3/23/19 and went to Urgent Care.  Has been on oral antibiotic for a 10 days - 8th - 9th day now of cefalexin.  Eyes are still crusty in the morning but improved after a week of eye drops.  Here primarily because of continuing right ear pain-was bothering her when she was seen at .  Still has sore throat too.  Using her inhalers for asthma as she does when gets a URI, but problem has been more with symptoms in her head.      URI    This is a new problem. The current episode started 1 to 4 weeks ago. The problem has been gradually improving. There has been no fever. Associated symptoms include coughing (mild), ear pain (right still painful - no drainage), a plugged ear sensation (on the left), sinus pain (but better) and a sore throat. Pertinent negatives include no chest pain, neck pain, rhinorrhea or swollen glands. Treatments tried: antibiotic.         Patient Active Problem List   Diagnosis   • Intramural and submucous leiomyoma of uterus   • Submucous and subserous leiomyoma of uterus   • At high risk for breast cancer   • Fibroid (bleeding) (uterine)   • Status post laparoscopic hysterectomy   • Body mass index (BMI) greater than 99th percentile for age in overweight child   • Mild asthma   • Mild vitamin D deficiency   • Well woman exam with routine gynecological exam   • Breast mass   • Cyst of left ovary   • Bilateral acute serous otitis media   • Upper respiratory tract  infection         Current Outpatient Prescriptions:   •  cefdinir (OMNICEF) 300 mg capsule, take 1 capsule by mouth twice a day for 10 days, Disp: , Rfl: 0  •  montelukast (SINGULAIR) 10 mg tablet, TAKE 1 TABLET BY MOUTH EVERY DAY, Disp: 30 tablet, Rfl: 3  •  PROAIR HFA 90 mcg/actuation inhaler, Inhale 2 puffs 4 (four) times a day as needed., Disp: , Rfl: 2  •  sertraline (ZOLOFT) 50 mg tablet, TAKE 1 TABLET BY MOUTH EVERY DAY, Disp: 30 tablet, Rfl: 2  •  SYMBICORT 80-4.5 mcg/actuation inhaler, TAKE 1 PUFF TWICE A DAY, Disp: 10.2 Inhaler, Rfl: 2  •  fluticasone propionate (FLONASE) 50 mcg/actuation nasal spray, Administer 1 spray into each nostril daily., Disp: 1 Bottle, Rfl: 1    Allergies   Allergen Reactions   • Feathers Shortness of breath   • Mold Shortness of breath   • Shellfish Derived GI intolerance       Social History     Social History   • Marital status:      Spouse name: N/A   • Number of children: N/A   • Years of education: N/A     Occupational History   • Not on file.     Social History Main Topics   • Smoking status: Former Smoker     Types: Cigarettes   • Smokeless tobacco: Never Used   • Alcohol use 4.2 oz/week     7 Glasses of wine per week   • Drug use: No   • Sexual activity: Yes     Partners: Male     Other Topics Concern   • Not on file     Social History Narrative   • No narrative on file       Health Maintenance   Topic Date Due   • Mammogram  05/07/2020   • DTaP, Tdap, and Td Vaccines (2 - Td) 06/27/2022   • Colonoscopy  03/31/2027   • HPV Vaccines  Aged Out   • Meningococcal Vaccine  Aged Out   • HIB Vaccines  Aged Out   • IPV Vaccines  Aged Out   • Influenza Vaccine  Completed       Past Medical History:   Diagnosis Date   • Abnormal Pap smear of cervix    • Anxiety    • Asthma    • Body mass index (BMI) greater than 99th percentile for age in overweight child    • Chlamydia    • H/O mammogram 05/2017   • Palpitations    • Personal history of urinary disorder    • Rheumatoid  "arthritis (CMS/HCC) (HCC)    • Sensation of pressure in bladder area    • Urinary retention with incomplete bladder emptying        The following have been reviewed and updated as appropriate in this visit:         Review of System  Review of Systems   HENT: Positive for ear pain (right still painful - no drainage), hearing loss, sinus pain (but better) and sore throat. Negative for rhinorrhea.    Respiratory: Positive for cough (mild).    Cardiovascular: Negative for chest pain.   Musculoskeletal: Negative for neck pain.       Objective     Vitals  Vitals:    04/01/19 1028   BP: 120/80   BP Location: Left upper arm   Patient Position: Sitting   Pulse: 90   Temp: 36.8 °C (98.2 °F)   SpO2: 99%   Weight: 71.7 kg (158 lb)   Height: 1.651 m (5' 5\")       Body mass index is 26.29 kg/m².    Physical Exam  Physical Exam   Constitutional: She appears well-nourished.   HENT:   Right Ear: Hearing, external ear and ear canal normal. Tympanic membrane is erythematous (slightly but cone of light present). A middle ear effusion is present.   Left Ear: Hearing, external ear and ear canal normal. Tympanic membrane is not erythematous. A middle ear effusion (small) is present.   Nose: Mucosal edema present. No rhinorrhea. Right sinus exhibits no maxillary sinus tenderness and no frontal sinus tenderness. Left sinus exhibits no maxillary sinus tenderness and no frontal sinus tenderness.   Mouth/Throat: Uvula is midline. Uvula swelling (mild) present. Posterior oropharyngeal edema (mild) and posterior oropharyngeal erythema present.   Eyes: Conjunctivae and lids are normal.   Neck: Neck supple.   Cardiovascular: Normal rate, regular rhythm and normal heart sounds.    Pulmonary/Chest: Effort normal and breath sounds normal. She has no decreased breath sounds. She has no wheezes.   Lymphadenopathy:     She has no cervical adenopathy.   Skin: Skin is warm, dry and intact.   Vitals reviewed.      Assessment/Plan     Problem List Items " Addressed This Visit        Nervous    Bilateral acute serous otitis media - Primary     Improved appearing but with pain persisting.  Start fluticasone to shrink membranes in posterior pharynx and allow middle ears to drain.  Continue cefdinir as prescribed by  provider - complete 10 day course.  If not improved in 2 - 3 days, consider staring medrol dose pack.         Relevant Medications    fluticasone propionate (FLONASE) 50 mcg/actuation nasal spray       Respiratory    Mild asthma     Appears comfortable and no wheezing  Continue inhalers   If this worsens, medrol dose cristin should be considered  See serous OM plan         Upper respiratory tract infection     Improving.  See plan for  Serous OM         Relevant Medications    cefdinir (OMNICEF) 300 mg capsule          No Follow-up on file.      Valentine Patel MD  4/1/2019

## 2019-04-02 NOTE — ASSESSMENT & PLAN NOTE
Improved appearing but with pain persisting.  Start fluticasone to shrink membranes in posterior pharynx and allow middle ears to drain.  Continue cefdinir as prescribed by UC provider - complete 10 day course.  If not improved in 2 - 3 days, consider staring medrol dose pack.

## 2019-04-02 NOTE — ASSESSMENT & PLAN NOTE
Appears comfortable and no wheezing  Continue inhalers   If this worsens, medrol dose cristin should be considered  See serous OM plan

## 2019-04-09 ENCOUNTER — HOSPITAL ENCOUNTER (OUTPATIENT)
Dept: RADIOLOGY | Facility: HOSPITAL | Age: 54
Discharge: HOME | End: 2019-04-09
Attending: PHYSICIAN ASSISTANT
Payer: COMMERCIAL

## 2019-04-09 DIAGNOSIS — N83.202 OVARIAN CYST, LEFT: Primary | ICD-10-CM

## 2019-04-09 DIAGNOSIS — N83.202 OVARIAN CYST, LEFT: ICD-10-CM

## 2019-04-09 PROCEDURE — 76856 US EXAM PELVIC COMPLETE: CPT

## 2019-04-09 PROCEDURE — 76830 TRANSVAGINAL US NON-OB: CPT

## 2019-04-10 ENCOUNTER — TELEPHONE (OUTPATIENT)
Dept: GYNECOLOGIC ONCOLOGY | Facility: CLINIC | Age: 54
End: 2019-04-10

## 2019-04-10 DIAGNOSIS — N83.202 LEFT OVARIAN CYST: Primary | ICD-10-CM

## 2019-04-10 NOTE — TELEPHONE ENCOUNTER
Called patient with normal pelvic ultrasound results.      IMPRESSION:  1.  Left ovarian simple cyst measuring 2.0 x 1.4 x 1.4 cm.  Annual follow-up is  recommended.  2.  Right ovary not visualized.  3.  Surgically absent uterus.    Recommended patient to have follow up ultrasound in 6-12 months.     Keke Botello PA-C

## 2019-05-04 DIAGNOSIS — M53.3 COCCYODYNIA: ICD-10-CM

## 2019-05-06 ENCOUNTER — HOSPITAL ENCOUNTER (OUTPATIENT)
Dept: RADIOLOGY | Age: 54
Discharge: HOME | End: 2019-05-06
Attending: NURSE PRACTITIONER
Payer: COMMERCIAL

## 2019-05-06 ENCOUNTER — OFFICE VISIT (OUTPATIENT)
Dept: SURGERY | Facility: CLINIC | Age: 54
End: 2019-05-06
Payer: COMMERCIAL

## 2019-05-06 VITALS
BODY MASS INDEX: 25.39 KG/M2 | TEMPERATURE: 98 F | HEIGHT: 65 IN | HEART RATE: 121 BPM | DIASTOLIC BLOOD PRESSURE: 74 MMHG | WEIGHT: 152.4 LBS | OXYGEN SATURATION: 98 % | SYSTOLIC BLOOD PRESSURE: 128 MMHG

## 2019-05-06 DIAGNOSIS — Z12.31 ENCOUNTER FOR SCREENING MAMMOGRAM FOR HIGH-RISK PATIENT: ICD-10-CM

## 2019-05-06 DIAGNOSIS — R92.30 DENSE BREAST TISSUE: ICD-10-CM

## 2019-05-06 DIAGNOSIS — Z91.89 AT HIGH RISK FOR BREAST CANCER: Primary | ICD-10-CM

## 2019-05-06 DIAGNOSIS — Z80.3 FAMILY HISTORY OF MALIGNANT NEOPLASM OF BREAST: ICD-10-CM

## 2019-05-06 PROCEDURE — 77063 BREAST TOMOSYNTHESIS BI: CPT

## 2019-05-06 PROCEDURE — 99213 OFFICE O/P EST LOW 20 MIN: CPT | Performed by: SURGERY

## 2019-05-06 ASSESSMENT — ENCOUNTER SYMPTOMS
TROUBLE SWALLOWING: 0
VOICE CHANGE: 0
STRIDOR: 0
SHORTNESS OF BREATH: 0
COLOR CHANGE: 0
DECREASED CONCENTRATION: 0
PALPITATIONS: 0
NERVOUS/ANXIOUS: 0
CONFUSION: 0
MYALGIAS: 0
ABDOMINAL PAIN: 0
WHEEZING: 0
BLOOD IN STOOL: 0
PHOTOPHOBIA: 0
UNEXPECTED WEIGHT CHANGE: 0
HEMATURIA: 0
BACK PAIN: 0
WEAKNESS: 0
FREQUENCY: 0
BRUISES/BLEEDS EASILY: 0
HEADACHES: 0
JOINT SWELLING: 0
CHEST TIGHTNESS: 0
FATIGUE: 0
AGITATION: 0
ARTHRALGIAS: 0
ADENOPATHY: 0
FLANK PAIN: 0

## 2019-05-06 NOTE — PROGRESS NOTES
Patient ID: Madonna Tipton                              : 1965    Visit Date: 19  Referring Provider: No ref. provider found  PCP: Brenda Agustin MD  GYN: Patient Care Team:  Kevin Vasquez MD as Consulting Physician (Gynecologic Oncology)    Subjective     Chief Complaint: Follow Up      Madonna Tipton is a 53 y.o. old female presenting today for high risk follow-up.  The patient has a lifetime risk of 24% by the Tyrer [place] model.  She is been followed with serial clinical exams, MRI, and 3D mammogram.  To date she has had one left breast biopsy for benign disease.    Patient mother had breast cancer at age 55, and  of primary liver cancer after hepatitis.    Patient has not had genetic testing, although we will refer her for genetics evaluation.    She did have an MRI for evaluation of her coccyx which demonstrated an ovarian cyst.  MRI and ultrasound of the ovarian cyst suggested a benign cyst, with follow-up recommended in 1 year.  The time of her GYN exam, left breast lump was identified with prompting diagnostic left mammogram and ultrasound.  No architecture distortion callus occasions or masses were identified on the mammogram, and the area of palpable concern on ultrasound demonstrated a focal area of dense fibrocystic change.    I reviewed all images and agree with the assessment is listed.       Review of Systems   Constitutional: Negative for fatigue and unexpected weight change.   HENT: Negative for trouble swallowing and voice change.    Eyes: Negative for photophobia and visual disturbance.   Respiratory: Negative for chest tightness, shortness of breath, wheezing and stridor.    Cardiovascular: Negative for chest pain, palpitations and leg swelling.   Gastrointestinal: Negative for abdominal pain and blood in stool.   Endocrine: Negative for cold intolerance, heat intolerance and polyuria.   Genitourinary: Negative for flank pain, frequency and hematuria.   Musculoskeletal:  "Negative for arthralgias, back pain, joint swelling and myalgias.   Skin: Negative for color change and rash.   Neurological: Negative for syncope, weakness and headaches.   Hematological: Negative for adenopathy. Does not bruise/bleed easily.   Psychiatric/Behavioral: Negative for agitation, confusion and decreased concentration. The patient is not nervous/anxious.          Medications: has a current medication list which includes the following prescription(s): alprazolam, fluticasone propionate, montelukast, proair hfa, sertraline, symbicort, and cefdinir.      Allergies: is allergic to feathers; mold; and shellfish derived.    Family History:   Family History   Problem Relation Age of Onset   • Colon cancer Maternal Grandfather    • Breast cancer Mother    • COPD Maternal Grandmother        Past Medical History:  has a past medical history of Abnormal Pap smear of cervix; Anxiety; Asthma; Body mass index (BMI) greater than 99th percentile for age in overweight child; Chlamydia; H/O mammogram (05/2017); Palpitations; Personal history of urinary disorder; Rheumatoid arthritis (CMS/HCC) ([facility]); Sensation of pressure in bladder area; and Urinary retention with incomplete bladder emptying.    Past Surgical History:  has a past surgical history that includes Colonoscopy (2016); Hernia repair (12/2016); Myomectomy; and Laparoscopic total hysterectomy (N/A, 06/28/2018).    Social History:  reports that she has quit smoking. Her smoking use included Cigarettes. She has never used smokeless tobacco. She reports that she drinks about 4.2 oz of alcohol per week . She reports that she does not use drugs.       The following have been reviewed and updated as appropriate in this visit:         Objective   Vitals: /74 (BP Location: Left upper arm, Patient Position: Sitting)   Pulse (!) 121   Temp 36.7 °C (98 °F) (Temporal)   Ht 1.651 m (5' 5\")   Wt 69.1 kg (152 lb 6.4 oz)   LMP 05/31/2018   SpO2 98%   BMI 25.36 kg/m² "     Physical Exam      The patient is awake alert.  There is no supraclavicular or cervical adenopathy.  There is no scleral icterus.  There is no thyroid nodules palpable.  There is no axillary adenopathy.  The breasts are symmetrical bilaterally.  There is no dominant mass in either breast, erythema, retraction or finding concerning for malignancy.    Assessment/Plan   Problem List Items Addressed This Visit     At high risk for breast cancer - Primary    Relevant Orders    BI SCREENING MAMMOGRAM BILATERAL    MRI BREAST BILATERAL WITH AND WITHOUT CONTRAST        She has a negative clinical exam for suspicion for malignancy, with negative imaging.  We have recommended continuation of high risk screening protocol, including MRI in 6 months and 3D mammogram in 1 year.  We have referred her for genetic counseling discussion and possible testing with her mother with breast cancer at age 55, and a Tyrer [place] lifetime risk score of 24%.       Return in about 1 year (around 5/6/2020).      Benja Herring MD

## 2019-05-06 NOTE — LETTER
May 6, 2019     Brenda Agustin MD  [address]  [facility] [address]    Patient: Madonna Tipton   YOB: 1965   Date of Visit: 2019       Dear Dr. Agustin:    Thank you for referring Madonna Tipton to me for evaluation. Below are my notes for this consultation.    If you have questions, please do not hesitate to call me. I look forward to following your patient along with you.         Sincerely,        Benja Herring MD        CC: MD Nima Kolb, Benja Clemente MD  2019  9:56 AM  Signed  Patient ID: Madonna Tipton                              : 1965    Visit Date: 19  Referring Provider: No ref. provider found  PCP: Brenda Agustin MD  GYN: Patient Care Team:  Kevin Vasquez MD as Consulting Physician (Gynecologic Oncology)    Subjective     Chief Complaint: Follow Up      Madonna Tipton is a 53 y.o. old female presenting today for high risk follow-up.  The patient has a lifetime risk of 24% by the Tyrer [place] model.  She is been followed with serial clinical exams, MRI, and 3D mammogram.  To date she has had one left breast biopsy for benign disease.    Patient mother had breast cancer at age 55, and  of primary liver cancer after hepatitis.    Patient has not had genetic testing, although we will refer her for genetics evaluation.    She did have an MRI for evaluation of her coccyx which demonstrated an ovarian cyst.  MRI and ultrasound of the ovarian cyst suggested a benign cyst, with follow-up recommended in 1 year.  The time of her GYN exam, left breast lump was identified with prompting diagnostic left mammogram and ultrasound.  No architecture distortion callus occasions or masses were identified on the mammogram, and the area of palpable concern on ultrasound demonstrated a focal area of dense fibrocystic change.    I reviewed all images and agree with the assessment is listed.       Review of Systems    Constitutional: Negative for fatigue and unexpected weight change.   HENT: Negative for trouble swallowing and voice change.    Eyes: Negative for photophobia and visual disturbance.   Respiratory: Negative for chest tightness, shortness of breath, wheezing and stridor.    Cardiovascular: Negative for chest pain, palpitations and leg swelling.   Gastrointestinal: Negative for abdominal pain and blood in stool.   Endocrine: Negative for cold intolerance, heat intolerance and polyuria.   Genitourinary: Negative for flank pain, frequency and hematuria.   Musculoskeletal: Negative for arthralgias, back pain, joint swelling and myalgias.   Skin: Negative for color change and rash.   Neurological: Negative for syncope, weakness and headaches.   Hematological: Negative for adenopathy. Does not bruise/bleed easily.   Psychiatric/Behavioral: Negative for agitation, confusion and decreased concentration. The patient is not nervous/anxious.          Medications: has a current medication list which includes the following prescription(s): alprazolam, fluticasone propionate, montelukast, proair hfa, sertraline, symbicort, and cefdinir.      Allergies: is allergic to feathers; mold; and shellfish derived.    Family History:   Family History   Problem Relation Age of Onset   • Colon cancer Maternal Grandfather    • Breast cancer Mother    • COPD Maternal Grandmother        Past Medical History:  has a past medical history of Abnormal Pap smear of cervix; Anxiety; Asthma; Body mass index (BMI) greater than 99th percentile for age in overweight child; Chlamydia; H/O mammogram (05/2017); Palpitations; Personal history of urinary disorder; Rheumatoid arthritis (CMS/HCC) ([facility]); Sensation of pressure in bladder area; and Urinary retention with incomplete bladder emptying.    Past Surgical History:  has a past surgical history that includes Colonoscopy (2016); Hernia repair (12/2016); Myomectomy; and Laparoscopic total hysterectomy  "(N/A, 06/28/2018).    Social History:  reports that she has quit smoking. Her smoking use included Cigarettes. She has never used smokeless tobacco. She reports that she drinks about 4.2 oz of alcohol per week . She reports that she does not use drugs.       The following have been reviewed and updated as appropriate in this visit:         Objective   Vitals: /74 (BP Location: Left upper arm, Patient Position: Sitting)   Pulse (!) 121   Temp 36.7 °C (98 °F) (Temporal)   Ht 1.651 m (5' 5\")   Wt 69.1 kg (152 lb 6.4 oz)   LMP 05/31/2018   SpO2 98%   BMI 25.36 kg/m²      Physical Exam      The patient is awake alert.  There is no supraclavicular or cervical adenopathy.  There is no scleral icterus.  There is no thyroid nodules palpable.  There is no axillary adenopathy.  The breasts are symmetrical bilaterally.  There is no dominant mass in either breast, erythema, retraction or finding concerning for malignancy.    Assessment/Plan   Problem List Items Addressed This Visit     At high risk for breast cancer - Primary    Relevant Orders    BI SCREENING MAMMOGRAM BILATERAL    MRI BREAST BILATERAL WITH AND WITHOUT CONTRAST        She has a negative clinical exam for suspicion for malignancy, with negative imaging.  We have recommended continuation of high risk screening protocol, including MRI in 6 months and 3D mammogram in 1 year.  We have referred her for genetic counseling discussion and possible testing with her mother with breast cancer at age 55, and a Radhagisel Garcias lifetime risk score of 24%.       Return in about 1 year (around 5/6/2020).      Benja Herring MD                     "

## 2019-05-07 ENCOUNTER — DOCUMENTATION (OUTPATIENT)
Dept: SURGERY | Facility: CLINIC | Age: 54
End: 2019-05-07

## 2019-05-07 NOTE — PROGRESS NOTES
Madonna called in regards to her genetic counseling appointment. I saw in Dr. Jaime note that he had put in a referral. I sent a note to Shaq Simms and sent Madonna New's information through the portal as requested. Lior Gallo, MSN, CRNP

## 2019-05-10 ENCOUNTER — DOCUMENTATION (OUTPATIENT)
Dept: SURGERY | Facility: CLINIC | Age: 54
End: 2019-05-10

## 2019-05-10 NOTE — PROGRESS NOTES
I contacted Shaq with Genetics and there would be a $250 out of pocket for Madonna. I called Madonna to let her know and she is still going to consider but this may affect timing. Lior Gallo, MSN,CRNP

## 2019-05-13 RX ORDER — SERTRALINE HYDROCHLORIDE 50 MG/1
TABLET, FILM COATED ORAL
Qty: 30 TABLET | Refills: 2 | Status: SHIPPED | OUTPATIENT
Start: 2019-05-13 | End: 2019-08-09 | Stop reason: SDUPTHER

## 2019-05-13 RX ORDER — MONTELUKAST SODIUM 10 MG/1
TABLET ORAL
Qty: 30 TABLET | Refills: 3 | Status: SHIPPED | OUTPATIENT
Start: 2019-05-13 | End: 2019-09-04 | Stop reason: SDUPTHER

## 2019-07-09 ENCOUNTER — TELEPHONE (OUTPATIENT)
Dept: GYNECOLOGIC ONCOLOGY | Facility: CLINIC | Age: 54
End: 2019-07-09

## 2019-07-09 NOTE — TELEPHONE ENCOUNTER
Recommended patient to use OTC care such as monistat in interim. IF symptoms worsens she is aware to call the office.    Keke Botello PA-C

## 2019-07-09 NOTE — TELEPHONE ENCOUNTER
Called and spoke with patient. She reports symptoms of vaginal itchiness began x 4 days ago. She reports no discharge at this time. Denies any dysuria, or any other symptoms. Scheduled patient to come in for swab on July 15th.    Keke Botello PA-C

## 2019-07-09 NOTE — TELEPHONE ENCOUNTER
----- Message from Lorena Trotter MA sent at 7/9/2019  8:14 AM EDT -----  Regarding: FW: Non-Urgent Medical Question  Contact: [phone]      ----- Message -----  From: Madonna Tipton  Sent: 7/8/2019  10:53 PM  To: Gyn Onc [facility] Clinical Support P  Subject: Non-Urgent Medical Question                      Hello,    I am wondering if Dr. Monroe can prescribe  fluconozole for a possible yeast infection.  I am having symptoms, possibly related to the heat (sweating) and wearing a wet swimsuit.  Thank you.    Madonna Tipton  [phone]

## 2019-07-10 ENCOUNTER — TELEPHONE (OUTPATIENT)
Dept: GYNECOLOGIC ONCOLOGY | Facility: CLINIC | Age: 54
End: 2019-07-10

## 2019-07-12 NOTE — TELEPHONE ENCOUNTER
Called patient to check to see how she was feeling. No answer, left detailed message for patient to call back if no resolution in symptoms    Keke Botello PA-C

## 2019-08-09 NOTE — TELEPHONE ENCOUNTER
Medicine Refill Request    Last Office Visit: 4/1/2019  Next Office Visit: Visit date not found    Needs appointment last seen for acute reason     Current Outpatient Prescriptions:   •  ALPRAZolam (XANAX) 0.5 mg tablet, 1/2-1 tablet one hour prior to flight, Disp: 8 tablet, Rfl: 0  •  cefdinir (OMNICEF) 300 mg capsule, take 1 capsule by mouth twice a day for 10 days, Disp: , Rfl: 0  •  fluticasone propionate (FLONASE) 50 mcg/actuation nasal spray, Administer 1 spray into each nostril daily., Disp: 1 Bottle, Rfl: 1  •  montelukast (SINGULAIR) 10 mg tablet, TAKE 1 TABLET BY MOUTH EVERY DAY, Disp: 30 tablet, Rfl: 3  •  PROAIR HFA 90 mcg/actuation inhaler, Inhale 2 puffs 4 (four) times a day as needed., Disp: , Rfl: 2  •  sertraline (ZOLOFT) 50 mg tablet, TAKE 1 TABLET BY MOUTH EVERY DAY, Disp: 30 tablet, Rfl: 2  •  SYMBICORT 80-4.5 mcg/actuation inhaler, TAKE 1 PUFF TWICE A DAY, Disp: 10.2 Inhaler, Rfl: 2      BP Readings from Last 3 Encounters:   05/06/19 128/74   04/01/19 120/80   03/06/19 (!) 149/90       Recent Lab results:  No results found for: CHOL, No results found for: HDL, No results found for: LDLCALC, No results found for: TRIG     Lab Results   Component Value Date    GLUCOSE 113 (H) 11/05/2018   , No results found for: HGBA1C      Lab Results   Component Value Date    CREATININE 0.72 11/05/2018       Lab Results   Component Value Date    TSH 1.180 11/05/2018

## 2019-08-16 RX ORDER — SERTRALINE HYDROCHLORIDE 50 MG/1
TABLET, FILM COATED ORAL
Qty: 30 TABLET | Refills: 0 | Status: SHIPPED | OUTPATIENT
Start: 2019-08-16 | End: 2019-08-28 | Stop reason: SDUPTHER

## 2019-08-28 ENCOUNTER — OFFICE VISIT (OUTPATIENT)
Dept: PRIMARY CARE | Facility: CLINIC | Age: 54
End: 2019-08-28
Payer: COMMERCIAL

## 2019-08-28 VITALS
OXYGEN SATURATION: 98 % | DIASTOLIC BLOOD PRESSURE: 82 MMHG | HEIGHT: 65 IN | BODY MASS INDEX: 25.99 KG/M2 | HEART RATE: 84 BPM | SYSTOLIC BLOOD PRESSURE: 120 MMHG | WEIGHT: 156 LBS

## 2019-08-28 DIAGNOSIS — R00.0 TACHYCARDIA: ICD-10-CM

## 2019-08-28 DIAGNOSIS — Z23 NEED FOR INFLUENZA VACCINATION: Primary | ICD-10-CM

## 2019-08-28 DIAGNOSIS — F41.9 ANXIETY: ICD-10-CM

## 2019-08-28 DIAGNOSIS — R33.9 URINARY RETENTION: ICD-10-CM

## 2019-08-28 PROCEDURE — 99214 OFFICE O/P EST MOD 30 MIN: CPT | Mod: 25 | Performed by: INTERNAL MEDICINE

## 2019-08-28 PROCEDURE — 90686 IIV4 VACC NO PRSV 0.5 ML IM: CPT | Performed by: INTERNAL MEDICINE

## 2019-08-28 PROCEDURE — 90471 IMMUNIZATION ADMIN: CPT | Performed by: INTERNAL MEDICINE

## 2019-08-28 RX ORDER — SERTRALINE HYDROCHLORIDE 50 MG/1
50 TABLET, FILM COATED ORAL
Qty: 30 TABLET | Refills: 6 | Status: SHIPPED | OUTPATIENT
Start: 2019-08-28 | End: 2020-04-22 | Stop reason: SDUPTHER

## 2019-08-28 NOTE — PATIENT INSTRUCTIONS
"  Patient Education   Influenza (Flu) Vaccine (Inactivated or Recombinant): What You Need to Know  1. Why get vaccinated?  Influenza (\"flu\") is a contagious disease that spreads around the [place] every year, usually between October and May.  Flu is caused by influenza viruses, and is spread mainly by coughing, sneezing, and close contact.  Anyone can get flu. Flu strikes suddenly and can last several days. Symptoms vary by age, but can include:  · fever/chills  · sore throat  · muscle aches  · fatigue  · cough  · headache  · runny or stuffy nose  Flu can also lead to pneumonia and blood infections, and cause diarrhea and seizures in children. If you have a medical condition, such as heart or lung disease, flu can make it worse.  Flu is more dangerous for some people. Infants and young children, people 65 years of age and older, pregnant women, and people with certain health conditions or a weakened immune system are at greatest risk.  Each year thousands of people in the [place] die from flu, and many more are hospitalized.  Flu vaccine can:  · keep you from getting flu,  · make flu less severe if you do get it, and  · keep you from spreading flu to your family and other people.  2. Inactivated and recombinant flu vaccines  A dose of flu vaccine is recommended every flu season. Children 6 months through 8 years of age may need two doses during the same flu season. Everyone else needs only one dose each flu season.  Some inactivated flu vaccines contain a very small amount of a mercury-based preservative called thimerosal. Studies have not shown thimerosal in vaccines to be harmful, but flu vaccines that do not contain thimerosal are available.  There is no live flu virus in flu shots. They cannot cause the flu.  There are many flu viruses, and they are always changing. Each year a new flu vaccine is made to protect against three or four viruses that are likely to cause disease in the upcoming flu " season. But even when the vaccine doesn't exactly match these viruses, it may still provide some protection.  Flu vaccine cannot prevent:  · flu that is caused by a virus not covered by the vaccine, or  · illnesses that look like flu but are not.  It takes about 2 weeks for protection to develop after vaccination, and protection lasts through the flu season.  3. Some people should not get this vaccine  Tell the person who is giving you the vaccine:  · If you have any severe, life-threatening allergies. If you ever had a life-threatening allergic reaction after a dose of flu vaccine, or have a severe allergy to any part of this vaccine, you may be advised not to get vaccinated. Most, but not all, types of flu vaccine contain a small amount of egg protein.  · If you ever had Guillain-Barré Syndrome (also called GBS). Some people with a history of GBS should not get this vaccine. This should be discussed with your doctor.  · If you are not feeling well. It is usually okay to get flu vaccine when you have a mild illness, but you might be asked to come back when you feel better.  4. Risks of a vaccine reaction  With any medicine, including vaccines, there is a chance of reactions. These are usually mild and go away on their own, but serious reactions are also possible.  Most people who get a flu shot do not have any problems with it.  Minor problems following a flu shot include:  · soreness, redness, or swelling where the shot was given  · hoarseness  · sore, red or itchy eyes  · cough  · fever  · aches  · headache  · itching  · fatigue  If these problems occur, they usually begin soon after the shot and last 1 or 2 days.  More serious problems following a flu shot can include the following:  · There may be a small increased risk of Guillain-[place] Syndrome (GBS) after inactivated flu vaccine. This risk has been estimated at 1 or 2 additional cases per million people vaccinated. This is much lower than the risk of severe  complications from flu, which can be prevented by flu vaccine.  · Young children who get the flu shot along with pneumococcal vaccine (PCV13) and/or DTaP vaccine at the same time might be slightly more likely to have a seizure caused by fever. Ask your doctor for more information. Tell your doctor if a child who is getting flu vaccine has ever had a seizure.  Problems that could happen after any injected vaccine:  · People sometimes faint after a medical procedure, including vaccination. Sitting or lying down for about 15 minutes can help prevent fainting, and injuries caused by a fall. Tell your doctor if you feel dizzy, or have vision changes or ringing in the ears.  · Some people get severe pain in the shoulder and have difficulty moving the arm where a shot was given. This happens very rarely.  · Any medication can cause a severe allergic reaction. Such reactions from a vaccine are very rare, estimated at about 1 in a million doses, and would happen within a few minutes to a few hours after the vaccination.  As with any medicine, there is a very remote chance of a vaccine causing a serious injury or death.  The safety of vaccines is always being monitored. For more information, visit: www.cdc.gov/vaccinesafety/  5. What if there is a serious reaction?  What should I look for?  Look for anything that concerns you, such as signs of a severe allergic reaction, very high fever, or unusual behavior.  Signs of a severe allergic reaction can include hives, swelling of the face and throat, difficulty breathing, a fast heartbeat, dizziness, and weakness. These would start a few minutes to a few hours after the vaccination.  What should I do?  · If you think it is a severe allergic reaction or other emergency that can't wait, call 9-1-1 and get the person to the nearest hospital. Otherwise, call your doctor.  · Reactions should be reported to the Vaccine Adverse Event Reporting System (VAERS). Your doctor should file this  report, or you can do it yourself through the VAERS web site at www.vaers.[facility].gov, or by calling 1-543.953.3696.  ¨ VAERS does not give medical advice.  6. The National Vaccine Injury Compensation Program  The National Vaccine Injury Compensation Program (VICP) is a federal program that was created to compensate people who may have been injured by certain vaccines.  Persons who believe they may have been injured by a vaccine can learn about the program and about filing a claim by calling 1-394.249.1347 or visiting the VICP website at www.[facility].gov/vaccinecompensation. There is a time limit to file a claim for compensation.  7. How can I learn more?  · Ask your healthcare provider. He or she can give you the vaccine package insert or suggest other sources of information.  · Call your local or state health department.  · Contact the Centers for Disease Control and Prevention (CDC):  ¨ Call 1-134.878.7542 (9-128-XOW-INFO) or  ¨ Visit CDC's website at www.cdc.gov/flu  Vaccine Information Statement, Inactivated Influenza Vaccine (08/07/2015)  This information is not intended to replace advice given to you by your health care provider. Make sure you discuss any questions you have with your health care provider.  Document Released: 10/12/2007 Document Revised: 09/07/2017 Document Reviewed: 09/07/2017  Elsevier Interactive Patient Education © 2017 Elsevier Inc.

## 2019-08-28 NOTE — PROGRESS NOTES
[facility]  Dr. Brenda Agustin  [address]  [place] [address]  Phone: [phone]  Fax: [phone]      Patient ID: Madonna Tipton                              : 1965    Visit Date: 2019    Chief Complaint: Follow-up      HPI  Med refill  Diagnosed a long time ago with urinary retention. Was drinking large amount of water and hardly peeing. Worried not emptying her bladder.  High heart rate isnongoing. Years ago she was evaluated by Dr Sabillon. It may be her anxiety, she states, but she would like to see him again.  Sometimes feels like she has a hard time getting all her breath out. Does have a diagnosis of asthma.        Subjective      Patient ID: Madonna Tipton is a 54 y.o. female.    Patient Active Problem List   Diagnosis   • Intramural and submucous leiomyoma of uterus   • Submucous and subserous leiomyoma of uterus   • At high risk for breast cancer   • Fibroid (bleeding) (uterine)   • Status post laparoscopic hysterectomy   • Body mass index (BMI) greater than 99th percentile for age in overweight child   • Mild asthma   • Mild vitamin D deficiency   • Well woman exam with routine gynecological exam   • Breast mass   • Cyst of left ovary   • Bilateral acute serous otitis media   • Upper respiratory tract infection   • Anxiety   • Tachycardia   • Urinary retention         Current Outpatient Prescriptions:   •  ALPRAZolam (XANAX) 0.5 mg tablet, 1/2-1 tablet one hour prior to flight, Disp: 8 tablet, Rfl: 0  •  fluticasone propionate (FLONASE) 50 mcg/actuation nasal spray, Administer 1 spray into each nostril daily., Disp: 1 Bottle, Rfl: 1  •  montelukast (SINGULAIR) 10 mg tablet, TAKE 1 TABLET BY MOUTH EVERY DAY, Disp: 30 tablet, Rfl: 3  •  PROAIR HFA 90 mcg/actuation inhaler, Inhale 2 puffs 4 (four) times a day as needed., Disp: , Rfl: 2  •  sertraline (ZOLOFT) 50 mg tablet, Take 1 tablet (50 mg total) by mouth once daily., Disp:  30 tablet, Rfl: 6  •  SYMBICORT 80-4.5 mcg/actuation inhaler, TAKE 1 PUFF TWICE A DAY, Disp: 10.2 Inhaler, Rfl: 2    Allergies   Allergen Reactions   • Feathers Shortness of breath   • Mold Shortness of breath   • Shellfish Derived GI intolerance       Social History     Social History   • Marital status:      Spouse name: N/A   • Number of children: N/A   • Years of education: N/A     Occupational History   • Not on file.     Social History Main Topics   • Smoking status: Former Smoker     Types: Cigarettes   • Smokeless tobacco: Never Used   • Alcohol use 4.2 oz/week     7 Glasses of wine per week   • Drug use: No   • Sexual activity: Yes     Partners: Male     Other Topics Concern   • Not on file     Social History Narrative   • No narrative on file       Health Maintenance   Topic Date Due   • HIV Screening  05/27/1979   • Zoster Vaccine (1 of 2) 05/27/2016   • Mammogram  05/06/2021   • DTaP, Tdap, and Td Vaccines (2 - Td) 06/27/2022   • Colonoscopy  03/31/2027   • Meningococcal ACWY  Aged Out   • Varicella Vaccines  Aged Out   • HIB Vaccines  Aged Out   • IPV Vaccines  Aged Out   • Influenza Vaccine  Completed   • HPV Vaccines  Aged Out   • Pneumococcal  Aged Out       Past Medical History:   Diagnosis Date   • Abnormal Pap smear of cervix    • Anxiety    • Asthma    • Body mass index (BMI) greater than 99th percentile for age in overweight child    • Chlamydia    • H/O mammogram 05/2017   • Palpitations    • Personal history of urinary disorder    • Rheumatoid arthritis (CMS/HCC) ([facility])    • Sensation of pressure in bladder area    • Urinary retention with incomplete bladder emptying        The following have been reviewed and updated as appropriate in this visit:  Allergies  Meds  Problems         Review of System  Review of Systems   Constitutional: Negative for diaphoresis, fatigue and fever.   HENT: Negative for mouth sores and sore throat.    Respiratory: Negative for cough and wheezing.   "  Cardiovascular: Negative for chest pain, palpitations and leg swelling.   Neurological: Negative for dizziness and headaches.       Objective     Vitals  Vitals:    08/28/19 0939   BP: 120/82   BP Location: Right upper arm   Patient Position: Sitting   Pulse: 84   SpO2: 98%   Weight: 70.8 kg (156 lb)   Height: 1.651 m (5' 5\")       Body mass index is 25.96 kg/m².    Physical Exam  Physical Exam   Constitutional: She is oriented to person, place, and time. She appears well-developed and well-nourished.   HENT:   Head: Normocephalic.   Nose: Nose normal.   Mouth/Throat: Oropharynx is clear and moist.   Eyes: Pupils are equal, round, and reactive to light. Conjunctivae and EOM are normal.   Neck: Normal range of motion. Neck supple.   Cardiovascular: Normal rate, regular rhythm, normal heart sounds and intact distal pulses.    No murmur heard.  Pulmonary/Chest: Effort normal and breath sounds normal. No respiratory distress.   Abdominal: Soft. Bowel sounds are normal. She exhibits no mass. There is no tenderness.   Musculoskeletal: Normal range of motion. She exhibits no edema or deformity.   Neurological: She is alert and oriented to person, place, and time. She displays normal reflexes. She exhibits normal muscle tone.   Skin: Skin is warm and dry.   Psychiatric: She has a normal mood and affect. Thought content normal.   Vitals reviewed.      Assessment/Plan     Problem List Items Addressed This Visit     Anxiety    Relevant Medications    sertraline (ZOLOFT) 50 mg tablet    Tachycardia    Relevant Orders    Ambulatory referral to Cardiology    Urinary retention    Relevant Orders    ULTRASOUND KIDNEYS / BLADDER    Urinalysis with Reflex Culture      Other Visit Diagnoses     Need for influenza vaccination    -  Primary    Recommended flu vaccine to the pt.    Relevant Orders    Influenza vaccine quadrivalent preservative free 6 mon and older IM (FluLaval) (Completed)          Patient Instructions     Patient " "Education   Influenza (Flu) Vaccine (Inactivated or Recombinant): What You Need to Know  1. Why get vaccinated?  Influenza (\"flu\") is a contagious disease that spreads around the [place] every year, usually between October and May.  Flu is caused by influenza viruses, and is spread mainly by coughing, sneezing, and close contact.  Anyone can get flu. Flu strikes suddenly and can last several days. Symptoms vary by age, but can include:  · fever/chills  · sore throat  · muscle aches  · fatigue  · cough  · headache  · runny or stuffy nose  Flu can also lead to pneumonia and blood infections, and cause diarrhea and seizures in children. If you have a medical condition, such as heart or lung disease, flu can make it worse.  Flu is more dangerous for some people. Infants and young children, people 65 years of age and older, pregnant women, and people with certain health conditions or a weakened immune system are at greatest risk.  Each year thousands of people in the [place] die from flu, and many more are hospitalized.  Flu vaccine can:  · keep you from getting flu,  · make flu less severe if you do get it, and  · keep you from spreading flu to your family and other people.  2. Inactivated and recombinant flu vaccines  A dose of flu vaccine is recommended every flu season. Children 6 months through 8 years of age may need two doses during the same flu season. Everyone else needs only one dose each flu season.  Some inactivated flu vaccines contain a very small amount of a mercury-based preservative called thimerosal. Studies have not shown thimerosal in vaccines to be harmful, but flu vaccines that do not contain thimerosal are available.  There is no live flu virus in flu shots. They cannot cause the flu.  There are many flu viruses, and they are always changing. Each year a new flu vaccine is made to protect against three or four viruses that are likely to cause disease in the upcoming flu season. But even " when the vaccine doesn't exactly match these viruses, it may still provide some protection.  Flu vaccine cannot prevent:  · flu that is caused by a virus not covered by the vaccine, or  · illnesses that look like flu but are not.  It takes about 2 weeks for protection to develop after vaccination, and protection lasts through the flu season.  3. Some people should not get this vaccine  Tell the person who is giving you the vaccine:  · If you have any severe, life-threatening allergies. If you ever had a life-threatening allergic reaction after a dose of flu vaccine, or have a severe allergy to any part of this vaccine, you may be advised not to get vaccinated. Most, but not all, types of flu vaccine contain a small amount of egg protein.  · If you ever had Guillain-Barré Syndrome (also called GBS). Some people with a history of GBS should not get this vaccine. This should be discussed with your doctor.  · If you are not feeling well. It is usually okay to get flu vaccine when you have a mild illness, but you might be asked to come back when you feel better.  4. Risks of a vaccine reaction  With any medicine, including vaccines, there is a chance of reactions. These are usually mild and go away on their own, but serious reactions are also possible.  Most people who get a flu shot do not have any problems with it.  Minor problems following a flu shot include:  · soreness, redness, or swelling where the shot was given  · hoarseness  · sore, red or itchy eyes  · cough  · fever  · aches  · headache  · itching  · fatigue  If these problems occur, they usually begin soon after the shot and last 1 or 2 days.  More serious problems following a flu shot can include the following:  · There may be a small increased risk of Guillain-[place] Syndrome (GBS) after inactivated flu vaccine. This risk has been estimated at 1 or 2 additional cases per million people vaccinated. This is much lower than the risk of severe complications  from flu, which can be prevented by flu vaccine.  · Young children who get the flu shot along with pneumococcal vaccine (PCV13) and/or DTaP vaccine at the same time might be slightly more likely to have a seizure caused by fever. Ask your doctor for more information. Tell your doctor if a child who is getting flu vaccine has ever had a seizure.  Problems that could happen after any injected vaccine:  · People sometimes faint after a medical procedure, including vaccination. Sitting or lying down for about 15 minutes can help prevent fainting, and injuries caused by a fall. Tell your doctor if you feel dizzy, or have vision changes or ringing in the ears.  · Some people get severe pain in the shoulder and have difficulty moving the arm where a shot was given. This happens very rarely.  · Any medication can cause a severe allergic reaction. Such reactions from a vaccine are very rare, estimated at about 1 in a million doses, and would happen within a few minutes to a few hours after the vaccination.  As with any medicine, there is a very remote chance of a vaccine causing a serious injury or death.  The safety of vaccines is always being monitored. For more information, visit: www.cdc.gov/vaccinesafety/  5. What if there is a serious reaction?  What should I look for?  Look for anything that concerns you, such as signs of a severe allergic reaction, very high fever, or unusual behavior.  Signs of a severe allergic reaction can include hives, swelling of the face and throat, difficulty breathing, a fast heartbeat, dizziness, and weakness. These would start a few minutes to a few hours after the vaccination.  What should I do?  · If you think it is a severe allergic reaction or other emergency that can't wait, call 9-1-1 and get the person to the nearest hospital. Otherwise, call your doctor.  · Reactions should be reported to the Vaccine Adverse Event Reporting System (VAERS). Your doctor should file this report, or  you can do it yourself through the VAERS web site at www.vaers.[facility].gov, or by calling 1-973.372.5595.  ¨ VAERS does not give medical advice.  6. The National Vaccine Injury Compensation Program  The National Vaccine Injury Compensation Program (VICP) is a federal program that was created to compensate people who may have been injured by certain vaccines.  Persons who believe they may have been injured by a vaccine can learn about the program and about filing a claim by calling 1-517.392.8517 or visiting the VICP website at www.[facility].gov/vaccinecompensation. There is a time limit to file a claim for compensation.  7. How can I learn more?  · Ask your healthcare provider. He or she can give you the vaccine package insert or suggest other sources of information.  · Call your local or state health department.  · Contact the Centers for Disease Control and Prevention (CDC):  ¨ Call 1-981.219.4902 (6-720-NWW-INFO) or  ¨ Visit CDC's website at www.cdc.gov/flu  Vaccine Information Statement, Inactivated Influenza Vaccine (08/07/2015)  This information is not intended to replace advice given to you by your health care provider. Make sure you discuss any questions you have with your health care provider.  Document Released: 10/12/2007 Document Revised: 09/07/2017 Document Reviewed: 09/07/2017  Elsevier Interactive Patient Education © 2017 Elsevier Inc.           Return in about 6 months (around 2/28/2020), or if symptoms worsen or fail to improve.      Brenda Agustin MD  9/2/2019

## 2019-09-02 ASSESSMENT — ENCOUNTER SYMPTOMS
WHEEZING: 0
HEADACHES: 0
PALPITATIONS: 0
DIAPHORESIS: 0
COUGH: 0
FEVER: 0
DIZZINESS: 0
FATIGUE: 0
SORE THROAT: 0

## 2019-09-04 RX ORDER — MONTELUKAST SODIUM 10 MG/1
TABLET ORAL
Qty: 30 TABLET | Refills: 3 | Status: SHIPPED | OUTPATIENT
Start: 2019-09-04 | End: 2019-10-27 | Stop reason: SDUPTHER

## 2019-09-04 RX ORDER — DILTIAZEM HYDROCHLORIDE 60 MG/1
TABLET, FILM COATED ORAL
Qty: 10.2 INHALER | Refills: 2 | Status: SHIPPED | OUTPATIENT
Start: 2019-09-04 | End: 2020-07-14 | Stop reason: SDUPTHER

## 2019-09-04 NOTE — TELEPHONE ENCOUNTER
Medicine Refill Request    Last Office Visit: 8/28/2019  Next Office Visit: Visit date not found        Current Outpatient Prescriptions:   •  ALPRAZolam (XANAX) 0.5 mg tablet, 1/2-1 tablet one hour prior to flight, Disp: 8 tablet, Rfl: 0  •  fluticasone propionate (FLONASE) 50 mcg/actuation nasal spray, Administer 1 spray into each nostril daily., Disp: 1 Bottle, Rfl: 1  •  montelukast (SINGULAIR) 10 mg tablet, TAKE 1 TABLET BY MOUTH EVERY DAY, Disp: 30 tablet, Rfl: 3  •  PROAIR HFA 90 mcg/actuation inhaler, Inhale 2 puffs 4 (four) times a day as needed., Disp: , Rfl: 2  •  sertraline (ZOLOFT) 50 mg tablet, Take 1 tablet (50 mg total) by mouth once daily., Disp: 30 tablet, Rfl: 6  •  SYMBICORT 80-4.5 mcg/actuation inhaler, TAKE 1 PUFF TWICE A DAY, Disp: 10.2 Inhaler, Rfl: 2      BP Readings from Last 3 Encounters:   08/28/19 120/82   05/06/19 128/74   04/01/19 120/80       Recent Lab results:  No results found for: CHOL, No results found for: HDL, No results found for: LDLCALC, No results found for: TRIG     Lab Results   Component Value Date    GLUCOSE 113 (H) 11/05/2018   , No results found for: HGBA1C      Lab Results   Component Value Date    CREATININE 0.72 11/05/2018       Lab Results   Component Value Date    TSH 1.180 11/05/2018

## 2019-09-05 NOTE — TELEPHONE ENCOUNTER
----- Message from Madonna Tipton sent at 2019  7:58 AM EDT -----  Regarding: Prescription Question  Contact: [phone]  Hi,    I just realized that both of my albuterol inhalers are , and one isn't working correctly!  I could not request a refill for this medication, because it has been so long since I filled the prescription.  Would you please call in a prescription with refills to the [facility]?  Thank you!

## 2019-09-06 LAB
APPEARANCE UR: CLEAR
BACTERIA #/AREA URNS HPF: NORMAL /[HPF]
BILIRUB UR QL STRIP: NEGATIVE
COLOR UR: YELLOW
EPI CELLS #/AREA URNS HPF: NORMAL /HPF
GLUCOSE UR QL: NEGATIVE
HGB UR QL STRIP: NEGATIVE
KETONES UR QL STRIP: NEGATIVE
LAB CORP URINALYSIS REFLEX: (no result)
LEUKOCYTE ESTERASE UR QL STRIP: NEGATIVE
MICRO URNS: (no result)
MICRO URNS: (no result)
MUCOUS THREADS URNS QL MICRO: PRESENT
NITRITE UR QL STRIP: NEGATIVE
PH UR STRIP: 6 [PH] (ref 5–7.5)
PROT UR QL STRIP: NEGATIVE
RBC #/AREA URNS HPF: NORMAL /HPF
SP GR UR: 1.01 (ref 1–1.03)
UROBILINOGEN UR STRIP-MCNC: 0.2 MG/DL (ref 0.2–1)
WBC #/AREA URNS HPF: NORMAL /HPF

## 2019-09-06 RX ORDER — ALBUTEROL SULFATE 90 UG/1
2 AEROSOL, METERED RESPIRATORY (INHALATION) 4 TIMES DAILY PRN
Qty: 1 INHALER | Refills: 2 | Status: SHIPPED | OUTPATIENT
Start: 2019-09-06 | End: 2020-03-23 | Stop reason: SDUPTHER

## 2019-09-18 DIAGNOSIS — R33.9 URINARY RETENTION: ICD-10-CM

## 2019-09-18 NOTE — PROGRESS NOTES
Please let the pt know her kidneys and bladder were found to be normal and she nearly totally emptied her bladder during the study. She may consider consulting with a urologist if her sxs continue. Vance

## 2019-10-21 ENCOUNTER — HOSPITAL ENCOUNTER (OUTPATIENT)
Dept: RADIOLOGY | Age: 54
Discharge: HOME | End: 2019-10-21
Attending: PHYSICIAN ASSISTANT
Payer: COMMERCIAL

## 2019-10-21 DIAGNOSIS — N83.202 LEFT OVARIAN CYST: ICD-10-CM

## 2019-10-21 PROCEDURE — 76830 TRANSVAGINAL US NON-OB: CPT

## 2019-10-23 ENCOUNTER — TELEPHONE (OUTPATIENT)
Dept: GYNECOLOGIC ONCOLOGY | Facility: CLINIC | Age: 54
End: 2019-10-23

## 2019-10-24 DIAGNOSIS — N83.202 CYST OF LEFT OVARY: Primary | ICD-10-CM

## 2019-10-25 DIAGNOSIS — Z01.812 PRE-PROCEDURE LAB EXAM: Primary | ICD-10-CM

## 2019-10-25 DIAGNOSIS — N83.209 CYST OF OVARY, UNSPECIFIED LATERALITY: ICD-10-CM

## 2019-10-27 DIAGNOSIS — J45.909 MILD ASTHMA, UNSPECIFIED WHETHER COMPLICATED, UNSPECIFIED WHETHER PERSISTENT: Primary | ICD-10-CM

## 2019-10-28 ENCOUNTER — TELEPHONE (OUTPATIENT)
Dept: GYNECOLOGIC ONCOLOGY | Facility: CLINIC | Age: 54
End: 2019-10-28

## 2019-10-28 RX ORDER — MONTELUKAST SODIUM 10 MG/1
TABLET ORAL
Qty: 30 TABLET | Refills: 3 | Status: SHIPPED | OUTPATIENT
Start: 2019-10-28 | End: 2019-11-29 | Stop reason: SDUPTHER

## 2019-10-28 NOTE — TELEPHONE ENCOUNTER
Pt had inquired about lab requisition for CA-125. Pt states that she did receive this via email at an earlier time but she wasn't sure if she was going to get it done. Pt states she may want to talk to Dr. Monroe at her next appt. prior to obtaining.

## 2019-10-28 NOTE — TELEPHONE ENCOUNTER
Medicine Refill Request    Last Office Visit: 8/28/2019  Next Office Visit: Visit date not found        Current Outpatient Medications:   •  ALPRAZolam (XANAX) 0.5 mg tablet, 1/2-1 tablet one hour prior to flight, Disp: 8 tablet, Rfl: 0  •  fluticasone propionate (FLONASE) 50 mcg/actuation nasal spray, Administer 1 spray into each nostril daily., Disp: 1 Bottle, Rfl: 1  •  montelukast (SINGULAIR) 10 mg tablet, TAKE 1 TABLET BY MOUTH EVERY DAY, Disp: 30 tablet, Rfl: 3  •  PROAIR HFA 90 mcg/actuation inhaler, Inhale 2 puffs 4 (four) times a day as needed for shortness of breath., Disp: 1 Inhaler, Rfl: 2  •  sertraline (ZOLOFT) 50 mg tablet, Take 1 tablet (50 mg total) by mouth once daily., Disp: 30 tablet, Rfl: 6  •  SYMBICORT 80-4.5 mcg/actuation inhaler, TAKE 1 PUFF TWICE A DAY, Disp: 10.2 Inhaler, Rfl: 2      BP Readings from Last 3 Encounters:   08/28/19 120/82   05/06/19 128/74   04/01/19 120/80       Recent Lab results:  No results found for: CHOL, No results found for: HDL, No results found for: LDLCALC, No results found for: TRIG     Lab Results   Component Value Date    GLUCOSE 113 (H) 11/05/2018   , No results found for: HGBA1C      Lab Results   Component Value Date    CREATININE 0.72 11/05/2018       Lab Results   Component Value Date    TSH 1.180 11/05/2018

## 2019-10-28 NOTE — TELEPHONE ENCOUNTER
Please contact patient and inform that lab request is in [facility] computer and if we need to mail request.  2.  I do not see Ultrasound results.  If not performed in [facility] may need to send results or disk to the office.

## 2019-10-30 ENCOUNTER — TELEPHONE (OUTPATIENT)
Dept: GYNECOLOGIC ONCOLOGY | Facility: CLINIC | Age: 54
End: 2019-10-30

## 2019-10-30 NOTE — TELEPHONE ENCOUNTER
Called patient to notify that she will repeat pelvic ultrasound in 6 months and follow-up with appointment.

## 2019-11-20 ENCOUNTER — TELEPHONE (OUTPATIENT)
Dept: SURGERY | Facility: CLINIC | Age: 54
End: 2019-11-20

## 2019-11-20 NOTE — TELEPHONE ENCOUNTER
LEFT DETAILED VM MESSAGE WITH BREAST MRI AUTH# [id number] ( VALID 11/20-1/18/2020) TO BE DONE AT [facility]. PT WILL SCHEDULE APPT AFTER 12/3/19 AS HER LAST ONE WAS 12/3/18. TOLD HER TO CALL ME BACK IF I CAN HELP IN ANY WAY.

## 2019-11-29 DIAGNOSIS — J45.909 MILD ASTHMA, UNSPECIFIED WHETHER COMPLICATED, UNSPECIFIED WHETHER PERSISTENT: ICD-10-CM

## 2019-11-29 RX ORDER — MONTELUKAST SODIUM 10 MG/1
10 TABLET ORAL
Qty: 30 TABLET | Refills: 2 | Status: SHIPPED | OUTPATIENT
Start: 2019-11-29 | End: 2020-02-27

## 2019-12-09 ENCOUNTER — HOSPITAL ENCOUNTER (OUTPATIENT)
Dept: RADIOLOGY | Facility: HOSPITAL | Age: 54
Discharge: HOME | End: 2019-12-09
Attending: SURGERY
Payer: COMMERCIAL

## 2019-12-09 DIAGNOSIS — Z91.89 AT HIGH RISK FOR BREAST CANCER: ICD-10-CM

## 2019-12-09 RX ORDER — GADOBUTROL 604.72 MG/ML
6.7 INJECTION INTRAVENOUS ONCE
Status: COMPLETED | OUTPATIENT
Start: 2019-12-09 | End: 2019-12-09

## 2019-12-09 RX ADMIN — GADOBUTROL 6.7 ML: 604.72 INJECTION INTRAVENOUS at 10:39

## 2019-12-11 ENCOUNTER — OFFICE VISIT (OUTPATIENT)
Dept: SURGERY | Facility: CLINIC | Age: 54
End: 2019-12-11
Payer: COMMERCIAL

## 2019-12-11 VITALS
SYSTOLIC BLOOD PRESSURE: 122 MMHG | BODY MASS INDEX: 24.49 KG/M2 | TEMPERATURE: 98.1 F | WEIGHT: 147 LBS | HEART RATE: 112 BPM | HEIGHT: 65 IN | DIASTOLIC BLOOD PRESSURE: 82 MMHG

## 2019-12-11 DIAGNOSIS — Z80.3 FAMILY HISTORY OF MALIGNANT NEOPLASM OF BREAST: ICD-10-CM

## 2019-12-11 DIAGNOSIS — Z91.89 AT HIGH RISK FOR BREAST CANCER: Primary | ICD-10-CM

## 2019-12-11 PROCEDURE — 99213 OFFICE O/P EST LOW 20 MIN: CPT | Performed by: NURSE PRACTITIONER

## 2019-12-11 ASSESSMENT — ENCOUNTER SYMPTOMS
CARDIOVASCULAR NEGATIVE: 1
RESPIRATORY NEGATIVE: 1
EYES NEGATIVE: 1
CONSTITUTIONAL NEGATIVE: 1
PSYCHIATRIC NEGATIVE: 1
MUSCULOSKELETAL NEGATIVE: 1
HEMATOLOGIC/LYMPHATIC NEGATIVE: 1
GASTROINTESTINAL NEGATIVE: 1
ALLERGIC/IMMUNOLOGIC NEGATIVE: 1
ENDOCRINE NEGATIVE: 1
NEUROLOGICAL NEGATIVE: 1

## 2019-12-11 NOTE — PROGRESS NOTES
[facility]  [facility]  [address] [place] [address]  Phone: [phone]  Fax: [phone]      Patient ID: Madonna Tipton                              : 1965    Visit Date: 2019  Referring Provider: No ref. provider found   PCP: Brenda Agustin MD  GYN: Patient Care Team:  Kevin Vasquez MD as Consulting Physician (Gynecologic Oncology)    Subjective: Madonna Tipton is a 54 year old female presenting today for high risk follow-up.  The patient has a lifetime risk of 24% by the Tyrer Dieter model.  She is been followed with serial clinical exams, MRI, and 3D mammogram.  To date she has had one left breast biopsy for benign disease.     Patient mother had breast cancer at age 55, and  of primary liver cancer after hepatitis.     Patient has not had genetic testing, although we will refer her for genetics evaluation.     She did have an MRI for evaluation of her coccyx which demonstrated an ovarian cyst.  MRI and ultrasound of the ovarian cyst suggested a benign cyst, with follow-up recommended in 1 year.  The time of her GYN exam, left breast lump was identified with prompting diagnostic left mammogram and ultrasound.  No architecture distortion callus occasions or masses were identified on the mammogram, and the area of palpable concern on ultrasound demonstrated a focal area of dense fibrocystic change.    Amber had her annual breast MRI on 2019 and this showed no evidence of malignancy with yearly follow-up.   She will be due for her annual screening mammogram in May 2020.      Oncology History:              Submucous and subserous leiomyoma of uterus    2018 Initial Diagnosis     Submucous and subserous leiomyoma of uterus           Review of Systems   Constitutional: Negative.    HENT: Negative.    Eyes: Negative.    Respiratory: Negative.    Cardiovascular: Negative.    Gastrointestinal: Negative.    Endocrine: Negative.   "  Genitourinary: Negative.    Musculoskeletal: Negative.    Skin: Negative.    Allergic/Immunologic: Negative.    Neurological: Negative.    Hematological: Negative.    Psychiatric/Behavioral: Negative.           Allergies: Feathers; Mold; and Shellfish derived    Current Medications: has a current medication list which includes the following prescription(s): alprazolam, fluticasone propionate, montelukast, proair hfa, sertraline, and symbicort.    Past Medical History:  has a past medical history of Abnormal Pap smear of cervix, Anxiety, Asthma, Body mass index (BMI) greater than 99th percentile for age in overweight child, Chlamydia, H/O mammogram (05/2017), Palpitations, Personal history of urinary disorder, Rheumatoid arthritis (CMS/HCC), Sensation of pressure in bladder area, and Urinary retention with incomplete bladder emptying.    Past Surgical History:  has a past surgical history that includes Colonoscopy (2016); Hernia repair (12/2016); Myomectomy; and Laparoscopic total hysterectomy (N/A, 06/28/2018).    Social History:  reports that she has quit smoking. Her smoking use included cigarettes. She has never used smokeless tobacco. She reports that she drinks about 7.0 standard drinks of alcohol per week. She reports that she does not use drugs.    Family History: family history includes Breast cancer in her biological mother; COPD in her maternal grandmother; Colon cancer in her maternal grandfather.        Physical Exam:    Vitals:   Visit Vitals  /82 (BP Location: Left upper arm, Patient Position: Sitting)   Pulse (!) 112   Temp 36.7 °C (98.1 °F) (Temporal)   Ht 1.651 m (5' 5\")   Wt 66.7 kg (147 lb)   LMP 05/31/2018   BMI 24.46 kg/m²     Body mass index is 24.46 kg/m².    Physical Exam  Physical Examination performed in the supine and sitting position  GENERAL: pleasant. In good spirits  BREAST SIZE:moderate   SYMMETRY yes       SKIN - Skin color, texture, turgor normal. No rashes or lesions Skin " is without tethering, dimpling, retraction or increased vascularity  AREOLA - normal    NIPPLES -  normal without retraction and without discharge  LYMPH NODES: infra, supra, cervical, parasternal and axillary nodes normal bilaterally  BREAST TISSUE: Right breast normal without discrete lesions. Left Breast  normal without discrete lesions.     Breast Imagin2019 bilateral breast MRI  IMPRESSION: No specific MRI evidence of malignancy.     MRI FINAL ASSESSMENT - BIRADS CATEGORY 2  - BENIGN FINDING     Impression:  High Risk for breast Cancer  Family history of breast cancer    Recommendation and Plan:   Amber is doing well. She has an older sister who is doing well with no changes. She did consider genetic testing but is declining at this time. She has had multiple MRI's with no evidence of malignancy and is considering taking a break in . She will be due in May for her annual mammogram. She has had a screening colonoscopy and continues regular follow-up with gynecology. We will continue to follow-up with a breast exam after her mammogram in 6 months.     All of the questions were answered.  The patient is in agreement with the treatment plan.      Return in about 6 months (around 2020) for mammogram and breast exam..        2019   1:58 PM        [facility]

## 2020-02-27 DIAGNOSIS — J45.909 MILD ASTHMA, UNSPECIFIED WHETHER COMPLICATED, UNSPECIFIED WHETHER PERSISTENT: ICD-10-CM

## 2020-02-27 RX ORDER — MONTELUKAST SODIUM 10 MG/1
TABLET ORAL
Qty: 30 TABLET | Refills: 2 | Status: SHIPPED | OUTPATIENT
Start: 2020-02-27 | End: 2020-05-18 | Stop reason: SDUPTHER

## 2020-02-27 NOTE — TELEPHONE ENCOUNTER
Medicine Refill Request    Last Office Visit: 8/28/2019  Next Office Visit: Visit date not found        Current Outpatient Medications:   •  ALPRAZolam (XANAX) 0.5 mg tablet, 1/2-1 tablet one hour prior to flight, Disp: 8 tablet, Rfl: 0  •  fluticasone propionate (FLONASE) 50 mcg/actuation nasal spray, Administer 1 spray into each nostril daily., Disp: 1 Bottle, Rfl: 1  •  montelukast (SINGULAIR) 10 mg tablet, Take 1 tablet (10 mg total) by mouth once daily., Disp: 30 tablet, Rfl: 2  •  PROAIR HFA 90 mcg/actuation inhaler, Inhale 2 puffs 4 (four) times a day as needed for shortness of breath., Disp: 1 Inhaler, Rfl: 2  •  sertraline (ZOLOFT) 50 mg tablet, Take 1 tablet (50 mg total) by mouth once daily., Disp: 30 tablet, Rfl: 6  •  SYMBICORT 80-4.5 mcg/actuation inhaler, TAKE 1 PUFF TWICE A DAY, Disp: 10.2 Inhaler, Rfl: 2      BP Readings from Last 3 Encounters:   12/11/19 122/82   08/28/19 120/82   05/06/19 128/74       Recent Lab results:  No results found for: CHOL, No results found for: HDL, No results found for: LDLCALC, No results found for: TRIG     Lab Results   Component Value Date    GLUCOSE 113 (H) 11/05/2018   , No results found for: HGBA1C      Lab Results   Component Value Date    CREATININE 0.72 11/05/2018       Lab Results   Component Value Date    TSH 1.180 11/05/2018

## 2020-03-23 RX ORDER — ALBUTEROL SULFATE 90 UG/1
2 AEROSOL, METERED RESPIRATORY (INHALATION) 4 TIMES DAILY PRN
Qty: 1 INHALER | Refills: 2 | Status: SHIPPED | OUTPATIENT
Start: 2020-03-23 | End: 2021-12-14 | Stop reason: SDUPTHER

## 2020-03-23 NOTE — TELEPHONE ENCOUNTER
Medicine Refill Request    Last Office Visit: 8/28/2019  Next Office Visit: Visit date not found        Current Outpatient Medications:   •  fluticasone propionate (FLONASE) 50 mcg/actuation nasal spray, Administer 1 spray into each nostril daily., Disp: 1 Bottle, Rfl: 1  •  LORazepam (ATIVAN) 0.5 mg tablet, Take 1 tablet (0.5 mg total) by mouth every 6 (six) hours as needed for anxiety., Disp: 30 tablet, Rfl: 0  •  montelukast (SINGULAIR) 10 mg tablet, TAKE 1 TABLET BY MOUTH EVERY DAY, Disp: 30 tablet, Rfl: 2  •  PROAIR HFA 90 mcg/actuation inhaler, Inhale 2 puffs 4 (four) times a day as needed for shortness of breath., Disp: 1 Inhaler, Rfl: 2  •  sertraline (ZOLOFT) 50 mg tablet, Take 1 tablet (50 mg total) by mouth once daily., Disp: 30 tablet, Rfl: 6  •  SYMBICORT 80-4.5 mcg/actuation inhaler, TAKE 1 PUFF TWICE A DAY, Disp: 10.2 Inhaler, Rfl: 2      BP Readings from Last 3 Encounters:   12/11/19 122/82   08/28/19 120/82   05/06/19 128/74       Recent Lab results:  No results found for: CHOL, No results found for: HDL, No results found for: LDLCALC, No results found for: TRIG     Lab Results   Component Value Date    GLUCOSE 113 (H) 11/05/2018   , No results found for: HGBA1C      Lab Results   Component Value Date    CREATININE 0.72 11/05/2018       Lab Results   Component Value Date    TSH 1.180 11/05/2018

## 2020-03-30 DIAGNOSIS — N89.8 VAGINAL ITCHING: Primary | ICD-10-CM

## 2020-03-30 RX ORDER — HYDROCORTISONE, IODOQUINOL 10; 10 MG/G; MG/G
CREAM TOPICAL 3 TIMES DAILY PRN
Qty: 1 TUBE | Refills: 0 | Status: SHIPPED | OUTPATIENT
Start: 2020-03-30 | End: 2021-05-31 | Stop reason: SDUPTHER

## 2020-04-22 ENCOUNTER — TELEMEDICINE (OUTPATIENT)
Dept: PRIMARY CARE | Facility: CLINIC | Age: 55
End: 2020-04-22
Payer: COMMERCIAL

## 2020-04-22 DIAGNOSIS — F41.9 ANXIETY: ICD-10-CM

## 2020-04-22 PROCEDURE — 99213 OFFICE O/P EST LOW 20 MIN: CPT | Mod: 95 | Performed by: INTERNAL MEDICINE

## 2020-04-22 RX ORDER — SERTRALINE HYDROCHLORIDE 50 MG/1
50 TABLET, FILM COATED ORAL
Qty: 30 TABLET | Refills: 6 | Status: SHIPPED | OUTPATIENT
Start: 2020-04-22 | End: 2020-08-26 | Stop reason: SDUPTHER

## 2020-04-22 NOTE — PROGRESS NOTES
Request for Consent:   Video Encounter   Mei, my name is Brenda Agustin MD.  Before we proceed, can you please verify your identification by telling me your full name and date of birth?  Can you tell me who is in the room with you?    You and I are about to have a telemedicine check-in or visit because you have requested it.  This is a live video-conference.  I am a real person, speaking to you in real time.  There is no one else with me on the video-conference.  However, when we use ([employer], [employer], etc) it is important for you to know that the video-conference may not be secure or private.  I am not recording this conversation and I am asking you not to record it.  This telemedicine visit will be billed to your health insurance or you, if you are self-insured.  You understand you will be responsible for any copayments or coinsurances that apply to your telemedicine visit.  Before starting our telemedicine visit, I am required to get your consent for this virtual check-in or visit by telemedicine. Do you consent?    Patient Response to Request for Consent:  Yes    4/22/2020    Visit Documentation:  Subjective     Patient ID: Madonna Tipton is a 54 y.o. female.  1965  Patient Active Problem List   Diagnosis   • Intramural and submucous leiomyoma of uterus   • Submucous and subserous leiomyoma of uterus   • At high risk for breast cancer   • Fibroid (bleeding) (uterine)   • Status post laparoscopic hysterectomy   • Body mass index (BMI) greater than 99th percentile for age in overweight child   • Mild asthma   • Mild vitamin D deficiency   • Well woman exam with routine gynecological exam   • Breast mass   • Cyst of left ovary   • Bilateral acute serous otitis media   • Upper respiratory tract infection   • Anxiety   • Tachycardia   • Urinary retention   • Family history of malignant neoplasm of breast     Current Outpatient Medications on File Prior to Visit   Medication Sig Dispense Refill   •  fluticasone propionate (FLONASE) 50 mcg/actuation nasal spray Administer 1 spray into each nostril daily. 1 Bottle 1   • hydrocortisone-iodoquinoL (VYTONE) 1-1 % cream Apply topically 3 (three) times a day as needed (for itching). 1 Tube 0   • LORazepam (ATIVAN) 0.5 mg tablet Take 1 tablet (0.5 mg total) by mouth every 6 (six) hours as needed for anxiety. 30 tablet 0   • montelukast (SINGULAIR) 10 mg tablet TAKE 1 TABLET BY MOUTH EVERY DAY 30 tablet 2   • PROAIR HFA 90 mcg/actuation inhaler Inhale 2 puffs 4 (four) times a day as needed for shortness of breath. 1 Inhaler 2   • SYMBICORT 80-4.5 mcg/actuation inhaler TAKE 1 PUFF TWICE A DAY 10.2 Inhaler 2     No current facility-administered medications on file prior to visit.      Allergies as of 04/22/2020 - Reviewed 04/22/2020   Allergen Reaction Noted   • Feathers Shortness of breath 09/27/2017   • Mold Shortness of breath 09/27/2017   • Shellfish derived GI intolerance 03/26/2018     Past Medical History:   Diagnosis Date   • Abnormal Pap smear of cervix    • Anxiety    • Asthma    • Body mass index (BMI) greater than 99th percentile for age in overweight child    • Chlamydia    • H/O mammogram 05/2017   • Palpitations    • Personal history of urinary disorder    • Rheumatoid arthritis (CMS/HCC)    • Sensation of pressure in bladder area    • Urinary retention with incomplete bladder emptying      CC: prescription renewal    HPI:  Pt states she and her family are waiting out the pandemic at their vacation home on [address] in [place]. She is running low on her sertraline and would like more called in to a local pharmacy.  States it is cold where they are but her health is perfectly fine.      The following have been reviewed and updated as appropriate in this visit:  Allergies  Meds  Problems       Review of Systems   Respiratory: Negative for cough and shortness of breath.    Cardiovascular: Negative for chest pain and palpitations.    Neurological: Negative for dizziness.     Physical Exam   Constitutional: She is oriented to person, place, and time. She appears well-developed and well-nourished. No distress.   HENT:   Head: Normocephalic.   Pulmonary/Chest: Effort normal.   Neurological: She is alert and oriented to person, place, and time.   Psychiatric: She has a normal mood and affect. Thought content normal.         Assessment/Plan   Diagnoses and all orders for this visit:    Anxiety  -     sertraline (ZOLOFT) 50 mg tablet; Take 1 tablet (50 mg total) by mouth once daily.    Follow up 6 months.

## 2020-04-23 ASSESSMENT — ENCOUNTER SYMPTOMS
SHORTNESS OF BREATH: 0
COUGH: 0
DIZZINESS: 0
PALPITATIONS: 0

## 2020-05-18 DIAGNOSIS — J45.909 MILD ASTHMA, UNSPECIFIED WHETHER COMPLICATED, UNSPECIFIED WHETHER PERSISTENT: ICD-10-CM

## 2020-05-18 RX ORDER — MONTELUKAST SODIUM 10 MG/1
10 TABLET ORAL
Qty: 30 TABLET | Refills: 2 | Status: SHIPPED | OUTPATIENT
Start: 2020-05-18 | End: 2020-08-26 | Stop reason: SDUPTHER

## 2020-05-18 NOTE — TELEPHONE ENCOUNTER
Medicine Refill Request    Last Office Visit: 8/28/2019  Last Telemedicine Visit: 4/22/2020 Brenda Agustin MD    Next Office Visit: Visit date not found  Next Telemedicine Visit: Visit date not found         Current Outpatient Medications:   •  fluticasone propionate (FLONASE) 50 mcg/actuation nasal spray, Administer 1 spray into each nostril daily., Disp: 1 Bottle, Rfl: 1  •  hydrocortisone-iodoquinoL (VYTONE) 1-1 % cream, Apply topically 3 (three) times a day as needed (for itching)., Disp: 1 Tube, Rfl: 0  •  LORazepam (ATIVAN) 0.5 mg tablet, Take 1 tablet (0.5 mg total) by mouth every 6 (six) hours as needed for anxiety., Disp: 30 tablet, Rfl: 0  •  montelukast (SINGULAIR) 10 mg tablet, TAKE 1 TABLET BY MOUTH EVERY DAY, Disp: 30 tablet, Rfl: 2  •  PROAIR HFA 90 mcg/actuation inhaler, Inhale 2 puffs 4 (four) times a day as needed for shortness of breath., Disp: 1 Inhaler, Rfl: 2  •  sertraline (ZOLOFT) 50 mg tablet, Take 1 tablet (50 mg total) by mouth once daily., Disp: 30 tablet, Rfl: 6  •  SYMBICORT 80-4.5 mcg/actuation inhaler, TAKE 1 PUFF TWICE A DAY, Disp: 10.2 Inhaler, Rfl: 2      BP Readings from Last 3 Encounters:   12/11/19 122/82   08/28/19 120/82   05/06/19 128/74       Recent Lab results:  No results found for: CHOL, No results found for: HDL, No results found for: LDLCALC, No results found for: TRIG     Lab Results   Component Value Date    GLUCOSE 113 (H) 11/05/2018   , No results found for: HGBA1C      Lab Results   Component Value Date    CREATININE 0.72 11/05/2018       Lab Results   Component Value Date    TSH 1.180 11/05/2018

## 2020-07-14 RX ORDER — BUDESONIDE AND FORMOTEROL FUMARATE DIHYDRATE 80; 4.5 UG/1; UG/1
2 AEROSOL RESPIRATORY (INHALATION)
Qty: 10.2 INHALER | Refills: 2 | Status: SHIPPED | OUTPATIENT
Start: 2020-07-14 | End: 2021-12-14 | Stop reason: SDUPTHER

## 2020-07-14 NOTE — TELEPHONE ENCOUNTER
Medicine Refill Request    Last Office Visit: 8/28/2019  Last Telemedicine Visit: 4/22/2020 Brenda Agustin MD    Next Office Visit: Visit date not found  Next Telemedicine Visit: Visit date not found         Current Outpatient Medications:   •  fluticasone propionate (FLONASE) 50 mcg/actuation nasal spray, Administer 1 spray into each nostril daily., Disp: 1 Bottle, Rfl: 1  •  hydrocortisone-iodoquinoL (VYTONE) 1-1 % cream, Apply topically 3 (three) times a day as needed (for itching)., Disp: 1 Tube, Rfl: 0  •  LORazepam (ATIVAN) 0.5 mg tablet, Take 1 tablet (0.5 mg total) by mouth every 6 (six) hours as needed for anxiety., Disp: 30 tablet, Rfl: 0  •  montelukast (SINGULAIR) 10 mg tablet, Take 1 tablet (10 mg total) by mouth once daily., Disp: 30 tablet, Rfl: 2  •  PROAIR HFA 90 mcg/actuation inhaler, Inhale 2 puffs 4 (four) times a day as needed for shortness of breath., Disp: 1 Inhaler, Rfl: 2  •  sertraline (ZOLOFT) 50 mg tablet, Take 1 tablet (50 mg total) by mouth once daily., Disp: 30 tablet, Rfl: 6  •  SYMBICORT 80-4.5 mcg/actuation inhaler, TAKE 1 PUFF TWICE A DAY, Disp: 10.2 Inhaler, Rfl: 2      BP Readings from Last 3 Encounters:   12/11/19 122/82   08/28/19 120/82   05/06/19 128/74       Recent Lab results:  No results found for: CHOL, No results found for: HDL, No results found for: LDLCALC, No results found for: TRIG     Lab Results   Component Value Date    GLUCOSE 113 (H) 11/05/2018   , No results found for: HGBA1C      Lab Results   Component Value Date    CREATININE 0.72 11/05/2018       Lab Results   Component Value Date    TSH 1.180 11/05/2018

## 2020-07-15 NOTE — PROGRESS NOTES
"Patient ID: Madonna Tipton   : 1965  MRN: [id number]   Visit Date: 2020    Subjective   Madonna Tipton is presenting today for Annual GYN Exam (55 y.o. pt here for routine gynecological exam )      HPI:  Ms. Tipton is a 55 year old post-menopasual female here today for annual GYN exam.   She has a surgical history of a [facility] BS for 18 week  fibroid uterus on 18.   She reports she is doing well.   She saw Dr. Herring in Dec 2018 for breast mass, had MRI that was benign.  Last year she came to our office after MRI for back pain revealed ovarian cyst. Pt was instructed by our office to get a repeat pelvic u/s but I do not see the results.   She otherwise is doing well and reports no other problems or concerns at todays visit.  Patient reports mild menopausal symptoms not disruptive to daily activities and some Lss of urine with sneeze not life limiting.  Routine breast exam and [facility] with [facility].  Denies sexual dysfunction.    Vital Signs for this encounter:   Visit Vitals  BP (!) 143/84   Pulse 98   Ht 1.651 m (5' 5\")   Wt 67.4 kg (148 lb 9.6 oz)   LMP 2018   SpO2 98%   BMI 24.73 kg/m²     Obstetric History:   OB History        2    Para   1    Term                AB   1    Living   1       SAB        TAB        Ectopic        Multiple        Live Births                   Past Medical History:  has a past medical history of Abnormal Pap smear of cervix, Anxiety, Asthma, Body mass index (BMI) greater than 99th percentile for age in overweight child, Chlamydia, H/O mammogram (2017), Palpitations, Personal history of urinary disorder, Rheumatoid arthritis (CMS/HCC), Sensation of pressure in bladder area, and Urinary retention with incomplete bladder emptying.  Past Surgical History:  has a past surgical history that includes Colonoscopy (); Hernia repair (2016); Myomectomy; and Laparoscopic total hysterectomy (N/A, 2018).  Family History: family " history includes Breast cancer in her biological mother; COPD in her maternal grandmother; Colon cancer in her maternal grandfather.  Social History:  reports that she has quit smoking. Her smoking use included cigarettes. She has never used smokeless tobacco. She reports that she drinks about 7.0 standard drinks of alcohol per week. She reports that she does not use drugs.  Medications:   Current Outpatient Medications:   •  budesonide-formoteroL (SYMBICORT) 80-4.5 mcg/actuation inhaler, Inhale 2 puffs 2 (two) times a day. Rinse mouth with water after use to reduce aftertaste and incidence of candidiasis. Do not swallow., Disp: 10.2 Inhaler, Rfl: 2  •  hydrocortisone-iodoquinoL (VYTONE) 1-1 % cream, Apply topically 3 (three) times a day as needed (for itching)., Disp: 1 Tube, Rfl: 0  •  PROAIR HFA 90 mcg/actuation inhaler, Inhale 2 puffs 4 (four) times a day as needed for shortness of breath., Disp: 1 Inhaler, Rfl: 2  •  fluticasone propionate (FLONASE) 50 mcg/actuation nasal spray, Administer 1 spray into each nostril daily. (Patient not taking: Reported on 7/20/2020 ), Disp: 1 Bottle, Rfl: 1  •  LORazepam (ATIVAN) 0.5 mg tablet, Take 1 tablet (0.5 mg total) by mouth every 6 (six) hours as needed for anxiety., Disp: 30 tablet, Rfl: 0  •  montelukast (SINGULAIR) 10 mg tablet, Take 1 tablet (10 mg total) by mouth once daily. (Patient not taking: Reported on 7/20/2020 ), Disp: 30 tablet, Rfl: 2  •  sertraline (ZOLOFT) 50 mg tablet, Take 1 tablet (50 mg total) by mouth once daily., Disp: 30 tablet, Rfl: 6  •  sertraline (ZOLOFT) 50 mg tablet, Take 50 mg by mouth once daily., Disp: , Rfl:     Allergies: is allergic to feathers; mold; and shellfish derived.     Review of Systems   Constitutional: Negative.    HENT: Negative.    Eyes: Negative.    Respiratory: Negative.    Cardiovascular: Negative.    Gastrointestinal: Negative.    Endocrine: Negative.    Genitourinary:        As per HPI   Musculoskeletal: Negative.     Skin: Negative.    Allergic/Immunologic:        Reviewed and updated   Neurological: Negative.    Psychiatric/Behavioral: Negative.       Chaperone Medical Assistant present throughout exam: Kenyatta Amezquita    Physical Exam   Constitutional: She is oriented to person, place, and time. She appears well-developed and well-nourished.   Genitourinary: Rectum normal and vagina normal. Pelvic exam was performed with patient in lithotomy exam position.     External female genitalia normal.   Urethral meatus normal.   Urethra normal. No bladder tenderness or bladder mass.   Vagina normal. The cervix is absent.   Uterus is absent. Uterine contour is regular.   Adnexa normal. Right adnexum does not display mass and does not display tenderness. Left adnexum does not display mass and does not display tenderness. Rectal exam normal.   Genitourinary Comments: Well supported and healed vaginal cuff.  Healthy mucosa   HENT:   Head: Normocephalic.   Eyes: Pupils are equal, round, and reactive to light.   Cardiovascular: Normal rate.   Pulmonary/Chest: Effort normal. No respiratory distress.   Abdominal: Soft. She exhibits no distension. There is no tenderness.   Neurological: She is alert and oriented to person, place, and time.   Skin: Skin is warm and dry.   Psychiatric: She has a normal mood and affect.   Nursing note and vitals reviewed.    Assessment/Plan   Problem List Items Addressed This Visit        Genitourinary    Cyst of left ovary     Asymptomatic.  CA - 125 declined.  Follow-up pelvic US.         Relevant Orders    US PELVIS TRANSABDOMINAL & TRANSVAGINAL       Other    History of hysterectomy for indication other than cancer    Well woman exam with routine gynecological exam - Primary     Healthy post hysterectomy well woman exam.  Well estrogenic genitalia.  Limited menopausal symptoms.  Recommend annual exam or sooner for distressing menopausal symptoms.         Breast mass      Other Visit Diagnoses     Encounter for  gynecological examination without abnormal finding          Patient counseled for healthy weight, exercise and diet. Maintain breast health and colon screening        The following have been marked reviewed and updated as appropriate in this visit:  Allergies  Meds  Problems       Return in about 1 year (around 7/20/2021) for Annual GYN Exam.  Ciro Monroe MD

## 2020-07-20 ENCOUNTER — OFFICE VISIT (OUTPATIENT)
Dept: GYNECOLOGIC ONCOLOGY | Facility: CLINIC | Age: 55
End: 2020-07-20
Attending: OBSTETRICS & GYNECOLOGY
Payer: COMMERCIAL

## 2020-07-20 ENCOUNTER — HOSPITAL ENCOUNTER (OUTPATIENT)
Dept: RADIOLOGY | Age: 55
Discharge: HOME | End: 2020-07-20
Attending: NURSE PRACTITIONER
Payer: COMMERCIAL

## 2020-07-20 ENCOUNTER — HOSPITAL ENCOUNTER (OUTPATIENT)
Dept: RADIOLOGY | Facility: CLINIC | Age: 55
Discharge: HOME | End: 2020-07-20
Attending: OBSTETRICS & GYNECOLOGY
Payer: COMMERCIAL

## 2020-07-20 VITALS
BODY MASS INDEX: 24.76 KG/M2 | WEIGHT: 148.6 LBS | OXYGEN SATURATION: 98 % | HEIGHT: 65 IN | HEART RATE: 98 BPM | DIASTOLIC BLOOD PRESSURE: 84 MMHG | SYSTOLIC BLOOD PRESSURE: 143 MMHG

## 2020-07-20 DIAGNOSIS — Z90.710 HISTORY OF HYSTERECTOMY FOR INDICATION OTHER THAN CANCER: ICD-10-CM

## 2020-07-20 DIAGNOSIS — N63.0 BREAST MASS: ICD-10-CM

## 2020-07-20 DIAGNOSIS — Z91.89 AT HIGH RISK FOR BREAST CANCER: ICD-10-CM

## 2020-07-20 DIAGNOSIS — N83.202 CYST OF LEFT OVARY: ICD-10-CM

## 2020-07-20 DIAGNOSIS — Z80.3 FAMILY HISTORY OF MALIGNANT NEOPLASM OF BREAST: ICD-10-CM

## 2020-07-20 DIAGNOSIS — Z01.419 ENCOUNTER FOR GYNECOLOGICAL EXAMINATION WITHOUT ABNORMAL FINDING: ICD-10-CM

## 2020-07-20 DIAGNOSIS — Z01.419 WELL WOMAN EXAM WITH ROUTINE GYNECOLOGICAL EXAM: Primary | ICD-10-CM

## 2020-07-20 PROBLEM — D25.1 INTRAMURAL AND SUBMUCOUS LEIOMYOMA OF UTERUS: Status: RESOLVED | Noted: 2018-03-26 | Resolved: 2020-07-20

## 2020-07-20 PROBLEM — D25.0 SUBMUCOUS AND SUBSEROUS LEIOMYOMA OF UTERUS: Status: RESOLVED | Noted: 2018-04-02 | Resolved: 2020-07-20

## 2020-07-20 PROBLEM — D25.2 SUBMUCOUS AND SUBSEROUS LEIOMYOMA OF UTERUS: Status: RESOLVED | Noted: 2018-04-02 | Resolved: 2020-07-20

## 2020-07-20 PROBLEM — D25.0 INTRAMURAL AND SUBMUCOUS LEIOMYOMA OF UTERUS: Status: RESOLVED | Noted: 2018-03-26 | Resolved: 2020-07-20

## 2020-07-20 PROCEDURE — 77067 SCR MAMMO BI INCL CAD: CPT

## 2020-07-20 PROCEDURE — 76830 TRANSVAGINAL US NON-OB: CPT

## 2020-07-20 PROCEDURE — S0612 ANNUAL GYNECOLOGICAL EXAMINA: HCPCS | Performed by: OBSTETRICS & GYNECOLOGY

## 2020-07-20 PROCEDURE — 76856 US EXAM PELVIC COMPLETE: CPT

## 2020-07-20 RX ORDER — SERTRALINE HYDROCHLORIDE 50 MG/1
50 TABLET, FILM COATED ORAL
COMMUNITY
Start: 2020-06-21 | End: 2021-06-18

## 2020-07-20 ASSESSMENT — ENCOUNTER SYMPTOMS
CARDIOVASCULAR NEGATIVE: 1
RESPIRATORY NEGATIVE: 1
PSYCHIATRIC NEGATIVE: 1
EYES NEGATIVE: 1
ALLERGIC/IMMUNOLOGIC COMMENTS: REVIEWED AND UPDATED
NEUROLOGICAL NEGATIVE: 1
MUSCULOSKELETAL NEGATIVE: 1
CONSTITUTIONAL NEGATIVE: 1
GASTROINTESTINAL NEGATIVE: 1
ENDOCRINE NEGATIVE: 1

## 2020-07-20 NOTE — ASSESSMENT & PLAN NOTE
Healthy post hysterectomy well woman exam.  Well estrogenic genitalia.  Limited menopausal symptoms.  Recommend annual exam or sooner for distressing menopausal symptoms.

## 2020-07-21 ENCOUNTER — TELEPHONE (OUTPATIENT)
Dept: GYNECOLOGIC ONCOLOGY | Facility: CLINIC | Age: 55
End: 2020-07-21

## 2020-07-21 NOTE — TELEPHONE ENCOUNTER
Hx of enlarging left ovarian cyst. Most recent ultrasound shows enlarging left ovarian cyst.( 2cm-5cm). Patient is asymptomatic. Called patient with ultrasound results and scheduled her for telemed/mychart visit with Dr. Monroe to discuss next steps in her management ( possibly further imaging vs OVA 1 testing  vs surgical intervention).

## 2020-07-21 NOTE — TELEPHONE ENCOUNTER
Please see results of transvaginal US:    IMPRESSION:  1.  Enlarged left ovary secondary to an enlarging 5.4 cm septated left ovarian  cystic lesion suspicious for cystic neoplasm, though probably benign given few  thin internal septations.  Clinical correlation and consideration to surgical  excision recommended.  2.  Nonvisualization of the right ovary.  3.  Hysterectomy.

## 2020-07-27 ENCOUNTER — TELEMEDICINE (OUTPATIENT)
Dept: GYNECOLOGIC ONCOLOGY | Facility: CLINIC | Age: 55
End: 2020-07-27
Payer: COMMERCIAL

## 2020-07-27 DIAGNOSIS — N83.202 CYST OF LEFT OVARY: Primary | ICD-10-CM

## 2020-07-27 PROCEDURE — 99214 OFFICE O/P EST MOD 30 MIN: CPT | Mod: 95 | Performed by: OBSTETRICS & GYNECOLOGY

## 2020-07-27 ASSESSMENT — ENCOUNTER SYMPTOMS
CONSTITUTIONAL NEGATIVE: 1
GASTROINTESTINAL NEGATIVE: 1
RESPIRATORY NEGATIVE: 1
MUSCULOSKELETAL NEGATIVE: 1
ENDOCRINE NEGATIVE: 1
NEUROLOGICAL NEGATIVE: 1
ALLERGIC/IMMUNOLOGIC COMMENTS: REVIEWED AND UPDATED
PSYCHIATRIC NEGATIVE: 1
CARDIOVASCULAR NEGATIVE: 1
EYES NEGATIVE: 1

## 2020-07-27 NOTE — PROGRESS NOTES
Verification of Patient Location:  The patient affirms they are currently located in the following state:[place]     Request for Consent:  You and I are about to have a telemedicine check-in or visit. This is allowed because you are already my patient, and you have requested it.  This telemedicine visit will be billed to your health insurance or you, if you are self-insured.  You understand you will be responsible for any copayments or coinsurances that apply to your telemedicine visit.  Before starting our telemedicine visit, I am required to get your consent for this virtual check-in or visit by telemedicine. Do you consent?      Patient Response to Request for Consent: Yes    The following have been reviewed and updated as appropriate in this visit:         Visit Documentation:  Patient ID: Madonna Tipton   : 1965  MRN: [id number]   Visit Date: 2020    Subjective   Madonna Tipton is presenting today for No chief complaint on file.      HPI:  Amber contacted by [employer] and confirmed [place] location and consent.  She reported feeling well without constitutional symptoms of fevers, night sweats, chills or unexplained weight loss.  She reported normal bowel and bladder function.  She further denied any pain.  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  Patient is well-known to us status post a total laparoscopic hysterectomy bilateral salpingectomy for 18-week fibroid uterus in 2018 reports doing well subsequently.      Vital Signs for this encounter:   Visit Vitals  LMP 2018       Obstetric History:   OB History        2    Para   1    Term                AB   1    Living   1       SAB        TAB        Ectopic        Multiple        Live Births                   Past Medical History:  has a past medical history of Abnormal Pap smear of cervix, Anxiety, Asthma, Body mass index (BMI) greater than  99th percentile for age in overweight child, Chlamydia, H/O mammogram (05/2017), Palpitations, Personal history of urinary disorder, Rheumatoid arthritis (CMS/HCC), Sensation of pressure in bladder area, and Urinary retention with incomplete bladder emptying.  Past Surgical History:  has a past surgical history that includes Colonoscopy (2016); Hernia repair (12/2016); Myomectomy; and Laparoscopic total hysterectomy (N/A, 06/28/2018).  Family History: family history includes Breast cancer in her biological mother; COPD in her maternal grandmother; Colon cancer in her maternal grandfather.  Social History:  reports that she has quit smoking. Her smoking use included cigarettes. She has never used smokeless tobacco. She reports that she drinks about 7.0 standard drinks of alcohol per week. She reports that she does not use drugs.  Medications:   Current Outpatient Medications:   •  budesonide-formoteroL (SYMBICORT) 80-4.5 mcg/actuation inhaler, Inhale 2 puffs 2 (two) times a day. Rinse mouth with water after use to reduce aftertaste and incidence of candidiasis. Do not swallow., Disp: 10.2 Inhaler, Rfl: 2  •  fluticasone propionate (FLONASE) 50 mcg/actuation nasal spray, Administer 1 spray into each nostril daily. (Patient not taking: Reported on 7/20/2020 ), Disp: 1 Bottle, Rfl: 1  •  hydrocortisone-iodoquinoL (VYTONE) 1-1 % cream, Apply topically 3 (three) times a day as needed (for itching)., Disp: 1 Tube, Rfl: 0  •  LORazepam (ATIVAN) 0.5 mg tablet, Take 1 tablet (0.5 mg total) by mouth every 6 (six) hours as needed for anxiety., Disp: 30 tablet, Rfl: 0  •  montelukast (SINGULAIR) 10 mg tablet, Take 1 tablet (10 mg total) by mouth once daily. (Patient not taking: Reported on 7/20/2020 ), Disp: 30 tablet, Rfl: 2  •  PROAIR HFA 90 mcg/actuation inhaler, Inhale 2 puffs 4 (four) times a day as needed for shortness of breath., Disp: 1 Inhaler, Rfl: 2  •  sertraline (ZOLOFT) 50 mg tablet, Take 1 tablet (50 mg total) by  mouth once daily., Disp: 30 tablet, Rfl: 6  •  sertraline (ZOLOFT) 50 mg tablet, Take 50 mg by mouth once daily., Disp: , Rfl:     Allergies: is allergic to feathers; mold; and shellfish derived.       Review of Systems   Constitutional: Negative.    HENT: Negative.    Eyes: Negative.    Respiratory: Negative.    Cardiovascular: Negative.    Gastrointestinal: Negative.    Endocrine: Negative.    Genitourinary:        As per HPI   Musculoskeletal: Negative.    Skin: Negative.    Allergic/Immunologic:        Reviewed and updated   Neurological: Negative.    Psychiatric/Behavioral: Negative.      Medical Assistant present throughout exam: none  Physical Exam   Constitutional: She appears well-developed.   Appeared well with NAD  EXAM - deferred       Pelvic US 7.20.20     --  IMPRESSION:  1.  Enlarged left ovary secondary to an enlarging 5.4 cm septated left ovarian  cystic lesion suspicious for cystic neoplasm, though probably benign given few  thin internal septations.  Clinical correlation and consideration to surgical  excision recommended.  2.  Nonvisualization of the right ovary.  3.  Hysterectomy.     In accordance [place] Act [place],  the patient will receive a letter notifying them  to follow up with their physician.    Assessment/Plan   Problem List Items Addressed This Visit        Genitourinary    Cyst of left ovary - Primary     Patient counseled of findings of enlarging ovarian cyst.  Informed of the morphology index 4/10 although low risk concern for enlarging cyst in rob/postmenopausal female is concerning.  Offered Ova1 +Serial US vs. Laparoscopic bilateral oophorectomy.  Patient questions answered.  She desired to discuss with partner and call with desired management plan. Recommend BSO considering risk for recurrent disease despite counseling for increase all cause mortality associated.                    The following have been marked reviewed and updated as appropriate in this visit:       No  follow-ups on file.  Ciro Monroe MD              Time Spent in Medical Discussion During This Encounter:  25 minutes

## 2020-07-27 NOTE — ASSESSMENT & PLAN NOTE
Patient counseled of findings of enlarging ovarian cyst.  Informed of the morphology index 4/10 although low risk concern for enlarging cyst in rob/postmenopausal female is concerning.  Offered Ova1 +Serial US vs. Laparoscopic bilateral oophorectomy.  Patient questions answered.  She desired to discuss with partner and call with desired management plan. Recommend BSO considering risk for recurrent disease despite counseling for increase all cause mortality associated.

## 2020-08-04 ENCOUNTER — TELEPHONE (OUTPATIENT)
Dept: GYNECOLOGIC ONCOLOGY | Facility: CLINIC | Age: 55
End: 2020-08-04

## 2020-08-04 LAB — CANCER AG125 SERPL-ACNC: 10.6 U/ML (ref 0–38.1)

## 2020-08-10 ENCOUNTER — TELEPHONE (OUTPATIENT)
Dept: GYNECOLOGIC ONCOLOGY | Facility: CLINIC | Age: 55
End: 2020-08-10

## 2020-08-10 DIAGNOSIS — N83.202 CYST OF LEFT OVARY: Primary | ICD-10-CM

## 2020-08-10 NOTE — TELEPHONE ENCOUNTER
Called Mrs. Tipton back regarding mychart message and follow-up plan. Patient understands that she will need to complete pelvic ultrasound in 3 months as well as OVA-1 testing. Instructed her to call our office when she would like to schedule for her ova -1 testing as we offer the kits here. Also instructed her to call back once ultrasound Is complete and ova-testing to schedule follow-up appointment.

## 2020-08-11 ENCOUNTER — TELEMEDICINE (OUTPATIENT)
Dept: SURGERY | Facility: CLINIC | Age: 55
End: 2020-08-11
Payer: COMMERCIAL

## 2020-08-11 VITALS — WEIGHT: 155 LBS | HEIGHT: 65 IN | BODY MASS INDEX: 25.83 KG/M2

## 2020-08-11 DIAGNOSIS — Z80.3 FAMILY HISTORY OF MALIGNANT NEOPLASM OF BREAST: ICD-10-CM

## 2020-08-11 DIAGNOSIS — Z91.89 AT HIGH RISK FOR BREAST CANCER: Primary | ICD-10-CM

## 2020-08-11 DIAGNOSIS — Z12.31 ENCOUNTER FOR SCREENING MAMMOGRAM FOR HIGH-RISK PATIENT: ICD-10-CM

## 2020-08-11 PROCEDURE — 99212 OFFICE O/P EST SF 10 MIN: CPT | Mod: 95 | Performed by: NURSE PRACTITIONER

## 2020-08-11 ASSESSMENT — ENCOUNTER SYMPTOMS
RESPIRATORY NEGATIVE: 1
EYES NEGATIVE: 1
GASTROINTESTINAL NEGATIVE: 1
ALLERGIC/IMMUNOLOGIC NEGATIVE: 1
PSYCHIATRIC NEGATIVE: 1
CARDIOVASCULAR NEGATIVE: 1
NEUROLOGICAL NEGATIVE: 1
MUSCULOSKELETAL NEGATIVE: 1
ENDOCRINE NEGATIVE: 1
CONSTITUTIONAL NEGATIVE: 1
HEMATOLOGIC/LYMPHATIC NEGATIVE: 1

## 2020-08-11 NOTE — PROGRESS NOTES
[facility]  [facility]  [address] [place] [address]  Phone: [phone]  Fax: [phone]      Patient ID: Madonna Tipton                              : 1965    Telemedicine Visit Date: 2020  Referring Provider: No ref. provider found   PCP: Brenda Agustin MD  GYN: Patient Care Team:  Kevin Vasquez MD as Consulting Physician (Gynecologic Oncology)    Telemedicine Video Visit Consent  You and I are about to have a telemedicine video visit. This is allowed because you are already my patient, and you have requested it and we agreed upon the appointment. This telemedicine visit will be billed to your health insurance or you, if you are self-insured. You understand you will be responsible for any co-payments or coinsurances that apply to your telemedicine visit. Before starting our telemedicine visit, I am required to get your consent for this visit by telemedicine video visit. Do you consent?       Patient verbally consented: Yes on 2020 : Time: 2:29 PM    Subjective: Madonna Tipton is a 55 year old female presenting today who I am conducting a telemedicine video visit for high risk follow-up. The patient has a lifetime risk of 24% by the Tyrer Fletcher model. She is been followed with serial clinical exams, MRI, and 3D mammogram. To date she has had one left breast biopsy for benign disease.     Amber's mother had breast cancer at age 55, and  of primary liver cancer after hepatitis.     Amber has not had genetic testing, although she was referred, and on speaking to her today, this is not something she is comfortable pursuing at this time. In 2018, Amber had a hysterectomy an pathology showed a leiomyoma. She tells me today that for the past year, her gynecologist has been following a cyst on her ovary and has now recommended a bilateral oophorectomy. Because of COVID-19, she is holding off on surgery at this time.     On 2020, Amber  had her annual screening mammogram. The breast tissue is heterogeneously dense, (breast density type C). There are no suspicious masses, architectural distortions or calcifications. There is no suspicious interval change. There is a clip in the left breast.     Oncology History:              Submucous and subserous leiomyoma of uterus (Resolved)    4/2/2018 Initial Diagnosis     Submucous and subserous leiomyoma of uterus           Review of Systems   Constitutional: Negative.    HENT: Negative.    Eyes: Negative.    Respiratory: Negative.    Cardiovascular: Negative.    Gastrointestinal: Negative.    Endocrine: Negative.    Genitourinary: Negative.    Musculoskeletal: Negative.    Skin: Negative.    Allergic/Immunologic: Negative.    Neurological: Negative.    Hematological: Negative.    Psychiatric/Behavioral: Negative.           Allergies: Feathers; Mold; and Shellfish derived    Current Medications: has a current medication list which includes the following prescription(s): budesonide-formoterol, fluticasone propionate, hydrocortisone-iodoquinol, lorazepam, montelukast, proair hfa, sertraline, and sertraline.    Past Medical History:  has a past medical history of Abnormal Pap smear of cervix, Anxiety, Asthma, Body mass index (BMI) greater than 99th percentile for age in overweight child, Chlamydia, H/O mammogram (05/2017), Palpitations, Personal history of urinary disorder, Rheumatoid arthritis (CMS/[facility]), Sensation of pressure in bladder area, and Urinary retention with incomplete bladder emptying.    Past Surgical History:  has a past surgical history that includes Colonoscopy (2016); Hernia repair (12/2016); Myomectomy; and Laparoscopic total hysterectomy (N/A, 06/28/2018).    Social History:  reports that she has quit smoking. Her smoking use included cigarettes. She has never used smokeless tobacco. She reports that she drinks about 7.0 standard drinks of alcohol per week. She reports that she does not use  "drugs.    Family History: family history includes Breast cancer in her biological mother; COPD in her maternal grandmother; Colon cancer in her maternal grandfather.    Physical Exam: The physical examination could not be performed as this was a telemedicine visit conducted at the patient's request. A description of the physical health of the breasts was per the patient's. I did not visualized the skin    Physical Exam:    Vitals:   Visit Vitals  Ht 1.651 m (5' 5\")   Wt 70.3 kg (155 lb)   LMP 2018   BMI 25.79 kg/m²     Body mass index is 25.79 kg/m².    Physical Exam  Physical Examination performed in the supine and sitting position  GENERAL: pleasant. In good spirits  NIPPLES: no changes. No inversion. No discharge.   BREAST TISSUE: Right breast normal without discrete lesions. Left Breast  normal without discrete lesions.     Breast Imagin2020 bilateral screening mammogram  IMPRESSION:  1.  No evidence of malignancy. Annual screening mammography is recommended.  2.  In addition, given the breast density and family history of breast cancer,  consideration should be given to high risk assessment, noting that screening MRI  may be warranted to supplement annual mammography.  Our nurse navigators are  available to provide additional information to you and/or the patient  ([phone]).  Written results will be conveyed to the patient.     FINAL ASSESSMENT - BI-RADS CATEGORY 1 - NEGATIVE ([facility])  HETEROGENEOUS    2019 Breast MRI    IMPRESSION: No specific MRI evidence of malignancy.     MRI FINAL ASSESSMENT - BIRADS CATEGORY 2  - BENIGN FINDING    Impression:  High risk for breast cancer  Family history of breast cancer    Recommendation and Plan:   Amber has negative imaging with her annual mammogram and breast MRI from December.  We have recommended continuation of high risk screening protocol, including MRI in 6 months and 3D mammogram in 1 year. Amber has had normal breast MRI 2016, " 2018, 2019. She will be skipping her breast MRI this year and resuming in 2021.      All of the questions were answered.  The patient is in agreement with the treatment plan.      Return in about 1 year (around 8/11/2021) for breast examination and mammogram.    This TELEMEDICINE Audio ONLY VISIT was conducted at the patient's request due to the current COVID-19 / CORONOVIRUS PANDEMIC. Phone call ended 2:39 pm    8/11/2020   4:34 PM        [facility]

## 2020-08-19 ENCOUNTER — TELEPHONE (OUTPATIENT)
Dept: GYNECOLOGIC ONCOLOGY | Facility: CLINIC | Age: 55
End: 2020-08-19

## 2020-08-24 DIAGNOSIS — N83.202 CYST OF LEFT OVARY: ICD-10-CM

## 2020-08-26 DIAGNOSIS — J45.909 MILD ASTHMA, UNSPECIFIED WHETHER COMPLICATED, UNSPECIFIED WHETHER PERSISTENT: ICD-10-CM

## 2020-08-26 DIAGNOSIS — F41.9 ANXIETY: ICD-10-CM

## 2020-08-26 RX ORDER — MONTELUKAST SODIUM 10 MG/1
10 TABLET ORAL
Qty: 30 TABLET | Refills: 2 | Status: SHIPPED | OUTPATIENT
Start: 2020-08-26 | End: 2020-12-18

## 2020-08-26 RX ORDER — SERTRALINE HYDROCHLORIDE 50 MG/1
50 TABLET, FILM COATED ORAL
Qty: 30 TABLET | Refills: 6 | Status: SHIPPED | OUTPATIENT
Start: 2020-08-26 | End: 2021-06-18 | Stop reason: SDUPTHER

## 2020-08-26 NOTE — TELEPHONE ENCOUNTER
Medicine Refill Request    Last Office Visit: 8/28/2019  Last Telemedicine Visit: 4/22/2020 Brenda Agustin MD    Next Office Visit: Visit date not found  Next Telemedicine Visit: Visit date not found         Current Outpatient Medications:   •  budesonide-formoteroL (SYMBICORT) 80-4.5 mcg/actuation inhaler, Inhale 2 puffs 2 (two) times a day. Rinse mouth with water after use to reduce aftertaste and incidence of candidiasis. Do not swallow., Disp: 10.2 Inhaler, Rfl: 2  •  fluticasone propionate (FLONASE) 50 mcg/actuation nasal spray, Administer 1 spray into each nostril daily. (Patient not taking: Reported on 7/20/2020 ), Disp: 1 Bottle, Rfl: 1  •  hydrocortisone-iodoquinoL (VYTONE) 1-1 % cream, Apply topically 3 (three) times a day as needed (for itching)., Disp: 1 Tube, Rfl: 0  •  LORazepam (ATIVAN) 0.5 mg tablet, Take 1 tablet (0.5 mg total) by mouth every 6 (six) hours as needed for anxiety., Disp: 30 tablet, Rfl: 0  •  montelukast (SINGULAIR) 10 mg tablet, Take 1 tablet (10 mg total) by mouth once daily. (Patient not taking: Reported on 7/20/2020 ), Disp: 30 tablet, Rfl: 2  •  PROAIR HFA 90 mcg/actuation inhaler, Inhale 2 puffs 4 (four) times a day as needed for shortness of breath., Disp: 1 Inhaler, Rfl: 2  •  sertraline (ZOLOFT) 50 mg tablet, Take 1 tablet (50 mg total) by mouth once daily., Disp: 30 tablet, Rfl: 6  •  sertraline (ZOLOFT) 50 mg tablet, Take 50 mg by mouth once daily., Disp: , Rfl:       BP Readings from Last 3 Encounters:   07/20/20 (!) 143/84   12/11/19 122/82   08/28/19 120/82       Recent Lab results:  No results found for: CHOL, No results found for: HDL, No results found for: LDLCALC, No results found for: TRIG     Lab Results   Component Value Date    GLUCOSE 113 (H) 11/05/2018   , No results found for: HGBA1C      Lab Results   Component Value Date    CREATININE 0.72 11/05/2018       Lab Results   Component Value Date    TSH 1.180 11/05/2018

## 2020-08-28 ENCOUNTER — TELEPHONE (OUTPATIENT)
Dept: GYNECOLOGIC ONCOLOGY | Facility: CLINIC | Age: 55
End: 2020-08-28

## 2020-08-31 ENCOUNTER — TELEPHONE (OUTPATIENT)
Dept: GYNECOLOGIC ONCOLOGY | Facility: CLINIC | Age: 55
End: 2020-08-31

## 2020-10-06 ENCOUNTER — OFFICE VISIT (OUTPATIENT)
Dept: PRIMARY CARE | Facility: CLINIC | Age: 55
End: 2020-10-06
Payer: COMMERCIAL

## 2020-10-06 VITALS
TEMPERATURE: 97.8 F | HEART RATE: 64 BPM | WEIGHT: 146 LBS | BODY MASS INDEX: 24.32 KG/M2 | SYSTOLIC BLOOD PRESSURE: 124 MMHG | HEIGHT: 65 IN | DIASTOLIC BLOOD PRESSURE: 80 MMHG

## 2020-10-06 DIAGNOSIS — E55.9 MILD VITAMIN D DEFICIENCY: ICD-10-CM

## 2020-10-06 DIAGNOSIS — Z23 NEED FOR VACCINATION: Primary | ICD-10-CM

## 2020-10-06 DIAGNOSIS — J45.909 MILD ASTHMA, UNSPECIFIED WHETHER COMPLICATED, UNSPECIFIED WHETHER PERSISTENT: ICD-10-CM

## 2020-10-06 PROCEDURE — 90686 IIV4 VACC NO PRSV 0.5 ML IM: CPT | Performed by: INTERNAL MEDICINE

## 2020-10-06 PROCEDURE — 99213 OFFICE O/P EST LOW 20 MIN: CPT | Mod: 25 | Performed by: INTERNAL MEDICINE

## 2020-10-06 PROCEDURE — 90471 IMMUNIZATION ADMIN: CPT | Performed by: INTERNAL MEDICINE

## 2020-10-06 ASSESSMENT — ENCOUNTER SYMPTOMS
VOMITING: 0
SPUTUM PRODUCTION: 0
SINUS PRESSURE: 0
DIFFICULTY BREATHING: 0
WHEEZING: 0
APNEA: 0
NAUSEA: 0
APPETITE CHANGE: 0
HEMOPTYSIS: 0
PALPITATIONS: 0
ACTIVITY CHANGE: 0
HOARSE VOICE: 0
SHORTNESS OF BREATH: 0
DYSPHORIC MOOD: 0
SINUS PAIN: 0
FEVER: 0
CHEST TIGHTNESS: 0
FREQUENT THROAT CLEARING: 0
FATIGUE: 0
NERVOUS/ANXIOUS: 0
SLEEP DISTURBANCE: 0
RHINORRHEA: 0
COUGH: 0
CHOKING: 0

## 2020-10-06 ASSESSMENT — PATIENT HEALTH QUESTIONNAIRE - PHQ9
SUM OF ALL RESPONSES TO PHQ9 QUESTIONS 1 & 2: 0
2. FEELING DOWN, DEPRESSED OR HOPELESS: NOT AT ALL
1. LITTLE INTEREST OR PLEASURE IN DOING THINGS: NOT AT ALL
DEPRESSION UNABLE TO ASSESS: YES

## 2020-10-06 NOTE — PROGRESS NOTES
[facility]  Dr. Erma Guillen  [address], [facility] [address]  [place] [address]  Phone: [phone]  Fax: [phone]      Patient ID: Madonna Tipton                              : 1965    Visit Date: 10/6/2020    Chief Complaint: Follow-up      Patient ID: Madonna Tipton is a 55 y.o. female.    HPI  Here for refill of meds.    Asthma. Well controlled. Mild. Needs steroid once every 5 years or so.     Asthma  There is no chest tightness, cough, difficulty breathing, frequent throat clearing, hemoptysis, hoarse voice, shortness of breath, sputum production or wheezing. This is a recurrent problem. The current episode started more than 1 year ago. The problem occurs rarely. The problem has been resolved. Pertinent negatives include no appetite change, chest pain, fever, postnasal drip, rhinorrhea or sneezing. Her symptoms are aggravated by animal exposure and pollen (mold, dust). Her symptoms are alleviated by beta-agonist, oral steroids and ipratropium. Her past medical history is significant for asthma.         Patient Active Problem List   Diagnosis   • At high risk for breast cancer   • History of hysterectomy for indication other than cancer   • Mild asthma   • Mild vitamin D deficiency   • Well woman exam with routine gynecological exam   • Breast mass   • Cyst of left ovary   • Bilateral acute serous otitis media   • Upper respiratory tract infection   • Anxiety   • Tachycardia   • Urinary retention   • Family history of malignant neoplasm of breast       Past Medical History:   Diagnosis Date   • Abnormal Pap smear of cervix    • Anxiety    • Asthma    • Body mass index (BMI) greater than 99th percentile for age in overweight child    • Chlamydia    • H/O mammogram 2017   • Palpitations    • Personal history of urinary disorder    • Rheumatoid arthritis (CMS/[facility])    • Sensation of pressure in bladder area    • Urinary retention with incomplete  bladder emptying        Past Surgical History:   Procedure Laterality Date   • COLONOSCOPY  2016   • HERNIA REPAIR  12/2016   • LAPAROSCOPIC TOTAL HYSTERECTOMY N/A 06/28/2018    TLH, soren salpingectomy, cystoscopy   • MYOMECTOMY           Current Outpatient Medications:   •  budesonide-formoteroL (SYMBICORT) 80-4.5 mcg/actuation inhaler, Inhale 2 puffs 2 (two) times a day. Rinse mouth with water after use to reduce aftertaste and incidence of candidiasis. Do not swallow., Disp: 10.2 Inhaler, Rfl: 2  •  hydrocortisone-iodoquinoL (VYTONE) 1-1 % cream, Apply topically 3 (three) times a day as needed (for itching)., Disp: 1 Tube, Rfl: 0  •  montelukast (SINGULAIR) 10 mg tablet, Take 1 tablet (10 mg total) by mouth once daily., Disp: 30 tablet, Rfl: 2  •  PROAIR HFA 90 mcg/actuation inhaler, Inhale 2 puffs 4 (four) times a day as needed for shortness of breath., Disp: 1 Inhaler, Rfl: 2  •  sertraline (ZOLOFT) 50 mg tablet, Take 50 mg by mouth once daily., Disp: , Rfl:   •  LORazepam (ATIVAN) 0.5 mg tablet, Take 1 tablet (0.5 mg total) by mouth every 6 (six) hours as needed for anxiety., Disp: 30 tablet, Rfl: 0  •  sertraline (ZOLOFT) 50 mg tablet, Take 1 tablet (50 mg total) by mouth once daily., Disp: 30 tablet, Rfl: 6    Allergies   Allergen Reactions   • Feathers Shortness of breath   • Mold Shortness of breath   • Shellfish Derived GI intolerance       Family History   Problem Relation Age of Onset   • Colon cancer Maternal Grandfather    • Breast cancer Biological Mother    • COPD Maternal Grandmother        Social History     Tobacco Use   • Smoking status: Former Smoker     Types: Cigarettes   • Smokeless tobacco: Never Used   Substance Use Topics   • Alcohol use: Yes     Alcohol/week: 7.0 standard drinks     Types: 7 Glasses of wine per week   • Drug use: No       Health Maintenance   Topic Date Due   • Zoster Vaccine (1 of 2) 05/27/2016   • Influenza Vaccine (1) 08/01/2020   • Pneumococcal (1 of 1 - PPSV23)  "11/18/2020 (Originally 5/27/1972)   • Hepatitis C Screening  10/06/2021 (Originally 5/27/1984)   • Varicella Vaccines (1 of 2 - 2-dose childhood series) 03/07/2031 (Originally 5/27/1967)   • HIV Screening  05/22/2050 (Originally 5/27/1979)   • DTaP, Tdap, and Td Vaccines (2 - Td) 06/27/2022   • Mammogram  07/20/2022   • Colonoscopy  03/31/2027   • Meningococcal ACWY  Aged Out   • HIB Vaccines  Aged Out   • IPV Vaccines  Aged Out   • HPV Vaccines  Aged Out         The following have been reviewed and updated as appropriate in this visit:  Problems         Review of System  Review of Systems   Constitutional: Negative for activity change, appetite change, fatigue and fever.   HENT: Negative for hoarse voice, postnasal drip, rhinorrhea, sinus pressure, sinus pain and sneezing.    Respiratory: Negative for apnea, cough, hemoptysis, sputum production, choking, chest tightness, shortness of breath and wheezing.    Cardiovascular: Negative for chest pain and palpitations.   Gastrointestinal: Negative for nausea and vomiting.   Allergic/Immunologic: Positive for environmental allergies.   Neurological: Negative for syncope.   Psychiatric/Behavioral: Negative for dysphoric mood and sleep disturbance. The patient is not nervous/anxious (under control).        Objective     Vitals  Vitals:    10/06/20 1008   BP: 124/80   BP Location: Left upper arm   Patient Position: Sitting   Pulse: 64   Temp: 36.6 °C (97.8 °F)   Weight: 66.2 kg (146 lb)   Height: 1.651 m (5' 5\")       Body mass index is 24.3 kg/m².    Physical Exam  Physical Exam  Vitals signs reviewed.   Constitutional:       General: She is not in acute distress.     Appearance: Normal appearance. She is well-developed. She is not diaphoretic.   HENT:      Head: Normocephalic and atraumatic.      Right Ear: External ear normal.      Left Ear: External ear normal.      Nose: Nose normal. No congestion or rhinorrhea.      Mouth/Throat:      Mouth: Mucous membranes are " moist.      Pharynx: Oropharynx is clear. No oropharyngeal exudate or posterior oropharyngeal erythema.   Eyes:      General: No scleral icterus.        Right eye: No discharge.         Left eye: No discharge.      Conjunctiva/sclera: Conjunctivae normal.      Pupils: Pupils are equal, round, and reactive to light.   Neck:      Musculoskeletal: Normal range of motion and neck supple.      Thyroid: No thyromegaly.      Vascular: No JVD.      Trachea: No tracheal deviation.   Cardiovascular:      Rate and Rhythm: Normal rate and regular rhythm.      Pulses: Normal pulses.      Heart sounds: Normal heart sounds. No murmur. No friction rub. No gallop.    Pulmonary:      Effort: Pulmonary effort is normal. No respiratory distress.      Breath sounds: Normal breath sounds. No wheezing or rales.   Chest:      Chest wall: No tenderness.   Abdominal:      General: There is no distension.      Palpations: Abdomen is soft. There is no mass.      Tenderness: There is no abdominal tenderness. There is no guarding.   Musculoskeletal: Normal range of motion.         General: No tenderness or deformity.   Lymphadenopathy:      Cervical: No cervical adenopathy.   Skin:     General: Skin is warm and dry.      Coloration: Skin is not pale.      Findings: No erythema or rash.   Neurological:      Mental Status: She is alert and oriented to person, place, and time.      Cranial Nerves: No cranial nerve deficit.      Sensory: No sensory deficit.      Motor: No weakness or abnormal muscle tone.   Psychiatric:         Behavior: Behavior normal.         Thought Content: Thought content normal.         Judgment: Judgment normal.         Assessment/Plan     Problem List Items Addressed This Visit        Respiratory    Mild asthma     Stable asthma. Needs refill of symbicort.   Currently on proair PRN, symbicort, singulair.             Digestive    Mild vitamin D deficiency      Other Visit Diagnoses     Need for vaccination    -  Primary         Planned labs at [facility] in 6 months or so.       No follow-ups on file.      Erma Guillen MD  10/6/2020

## 2020-12-17 DIAGNOSIS — J45.909 MILD ASTHMA, UNSPECIFIED WHETHER COMPLICATED, UNSPECIFIED WHETHER PERSISTENT: ICD-10-CM

## 2020-12-17 NOTE — TELEPHONE ENCOUNTER
Medicine Refill Request    Last Office Visit: 10/06/2020  Last Telemedicine Visit: 4/22/2020 Brenda Agustin MD    Next Office Visit: Visit date not found  Next Telemedicine Visit: Visit date not found         Current Outpatient Medications:   •  budesonide-formoteroL (SYMBICORT) 80-4.5 mcg/actuation inhaler, Inhale 2 puffs 2 (two) times a day. Rinse mouth with water after use to reduce aftertaste and incidence of candidiasis. Do not swallow., Disp: 10.2 Inhaler, Rfl: 2  •  hydrocortisone-iodoquinoL (VYTONE) 1-1 % cream, Apply topically 3 (three) times a day as needed (for itching)., Disp: 1 Tube, Rfl: 0  •  LORazepam (ATIVAN) 0.5 mg tablet, Take 1 tablet (0.5 mg total) by mouth every 6 (six) hours as needed for anxiety., Disp: 30 tablet, Rfl: 0  •  montelukast (SINGULAIR) 10 mg tablet, Take 1 tablet (10 mg total) by mouth once daily., Disp: 30 tablet, Rfl: 2  •  PROAIR HFA 90 mcg/actuation inhaler, Inhale 2 puffs 4 (four) times a day as needed for shortness of breath., Disp: 1 Inhaler, Rfl: 2  •  sertraline (ZOLOFT) 50 mg tablet, Take 50 mg by mouth once daily., Disp: , Rfl:   •  sertraline (ZOLOFT) 50 mg tablet, Take 1 tablet (50 mg total) by mouth once daily., Disp: 30 tablet, Rfl: 6      BP Readings from Last 3 Encounters:   10/06/20 124/80   07/20/20 (!) 143/84   12/11/19 122/82       Recent Lab results:  No results found for: CHOL, No results found for: HDL, No results found for: LDLCALC, No results found for: TRIG     Lab Results   Component Value Date    GLUCOSE 113 (H) 11/05/2018   , No results found for: HGBA1C      Lab Results   Component Value Date    CREATININE 0.72 11/05/2018       Lab Results   Component Value Date    TSH 1.180 11/05/2018

## 2020-12-18 RX ORDER — MONTELUKAST SODIUM 10 MG/1
TABLET ORAL
Qty: 30 TABLET | Refills: 2 | Status: SHIPPED | OUTPATIENT
Start: 2020-12-18 | End: 2021-03-22

## 2021-02-01 NOTE — PROGRESS NOTES
Bladder cancer with hematuria worsening recently  S/p 3 units transfusion 2/1, 2/2, 2/3  Urology on the board, s/p cystoscopy 2/2  Blood transfusion if Hb <7     Patient ID: Madonna Tipton                              : 1965    Visit Date: 2018  Referring Provider: Brenda Agustin MD  PCP: Brenda Agustin MD  GYN: No care team member to display    Subjective   Chief Complaint: Breast Check (Follow up on Mammo and US)    Madonna Tipton is a 52 y.o. old female presenting today for follow-up for high risk for breast cancer.  Madonna has high risk for breast cancer with a Ollie Cusic lifetime risk of 24%.  She then followed with serial MRIs in , , and .  She is noted no change to her breast since her last exam.  She did have mammogram performed in May which I chance review.  The showed no architectural distortion masses or calcifications of concern for malignancy.       Review of Systems   Constitutional: Negative for fatigue and unexpected weight change.   HENT: Negative for trouble swallowing and voice change.    Eyes: Negative for photophobia and visual disturbance.   Respiratory: Negative for chest tightness, shortness of breath, wheezing and stridor.    Cardiovascular: Negative for chest pain, palpitations and leg swelling.   Gastrointestinal: Negative for abdominal pain and blood in stool.   Endocrine: Negative for cold intolerance, heat intolerance and polyuria.   Genitourinary: Negative for flank pain, frequency and hematuria.   Musculoskeletal: Negative for arthralgias, back pain, joint swelling and myalgias.   Skin: Negative for color change and rash.   Neurological: Negative for syncope, weakness and headaches.   Hematological: Negative for adenopathy. Does not bruise/bleed easily.   Psychiatric/Behavioral: Negative for agitation, confusion and decreased concentration. The patient is not nervous/anxious.          Family History: family history includes Breast cancer in her mother; Colon cancer in her maternal grandfather.  Allergies: is allergic to feathers; mold; and shellfish derived.    Objective   Vitals: /76 (BP Location: Right  forearm, Patient Position: Sitting)   Temp 36.9 °C (98.4 °F) (Temporal)   Wt 75.4 kg (166 lb 3.2 oz)   BMI 27.66 kg/m²   Physical Exam   Pulmonary/Chest:   There is no supraclavicular or cervical adenopathy.  Extra adenopathy is absent.  There is no scleral icterus.  The breasts are symmetrical. There is no dominant mass in either breast.There is no erythema or nipple discharge. There is no clinical suspicion for malignancy.            Assessment/Plan   Problem List Items Addressed This Visit     At high risk for breast cancer - Primary    Relevant Orders    BI SCREENING MAMMOGRAM BILATERAL        She appears without clinical evidence of disease at this time.  We have recommended bilateral mammogram in 1 year with clinical exam.  She is due for an MRI in 6 months.  Return in about 1 year (around 5/21/2019).       Benja Herring MD

## 2021-03-09 DIAGNOSIS — N83.202 CYST OF LEFT OVARY: Primary | ICD-10-CM

## 2021-03-20 DIAGNOSIS — J45.909 MILD ASTHMA, UNSPECIFIED WHETHER COMPLICATED, UNSPECIFIED WHETHER PERSISTENT: ICD-10-CM

## 2021-03-22 RX ORDER — MONTELUKAST SODIUM 10 MG/1
TABLET ORAL
Qty: 30 TABLET | Refills: 2 | Status: SHIPPED | OUTPATIENT
Start: 2021-03-22 | End: 2021-07-09

## 2021-03-22 NOTE — TELEPHONE ENCOUNTER
Medicine Refill Request    Last Office Visit: 10/06/2020   Last Telemedicine Visit: 4/22/2020 Brenda Agustin MD    Next Office Visit: Visit date not found  Next Telemedicine Visit: Visit date not found         Current Outpatient Medications:   •  budesonide-formoteroL (SYMBICORT) 80-4.5 mcg/actuation inhaler, Inhale 2 puffs 2 (two) times a day. Rinse mouth with water after use to reduce aftertaste and incidence of candidiasis. Do not swallow., Disp: 10.2 Inhaler, Rfl: 2  •  hydrocortisone-iodoquinoL (VYTONE) 1-1 % cream, Apply topically 3 (three) times a day as needed (for itching)., Disp: 1 Tube, Rfl: 0  •  LORazepam (ATIVAN) 0.5 mg tablet, Take 1 tablet (0.5 mg total) by mouth every 6 (six) hours as needed for anxiety., Disp: 30 tablet, Rfl: 0  •  montelukast (SINGULAIR) 10 mg tablet, TAKE 1 TABLET BY MOUTH EVERY DAY, Disp: 30 tablet, Rfl: 2  •  PROAIR HFA 90 mcg/actuation inhaler, Inhale 2 puffs 4 (four) times a day as needed for shortness of breath., Disp: 1 Inhaler, Rfl: 2  •  sertraline (ZOLOFT) 50 mg tablet, Take 50 mg by mouth once daily., Disp: , Rfl:   •  sertraline (ZOLOFT) 50 mg tablet, Take 1 tablet (50 mg total) by mouth once daily., Disp: 30 tablet, Rfl: 6      BP Readings from Last 3 Encounters:   10/06/20 124/80   07/20/20 (!) 143/84   12/11/19 122/82       Recent Lab results:  No results found for: CHOL, No results found for: HDL, No results found for: LDLCALC, No results found for: TRIG     Lab Results   Component Value Date    GLUCOSE 113 (H) 11/05/2018   , No results found for: HGBA1C      Lab Results   Component Value Date    CREATININE 0.72 11/05/2018       Lab Results   Component Value Date    TSH 1.180 11/05/2018

## 2021-03-29 ENCOUNTER — HOSPITAL ENCOUNTER (OUTPATIENT)
Dept: RADIOLOGY | Facility: HOSPITAL | Age: 56
Discharge: HOME | End: 2021-03-29
Attending: PHYSICIAN ASSISTANT
Payer: COMMERCIAL

## 2021-03-29 DIAGNOSIS — N83.202 CYST OF LEFT OVARY: ICD-10-CM

## 2021-03-29 PROCEDURE — 76856 US EXAM PELVIC COMPLETE: CPT

## 2021-04-19 ENCOUNTER — TELEMEDICINE (OUTPATIENT)
Dept: GYNECOLOGIC ONCOLOGY | Facility: CLINIC | Age: 56
End: 2021-04-19
Payer: COMMERCIAL

## 2021-04-19 DIAGNOSIS — N83.202 CYST OF LEFT OVARY: Primary | ICD-10-CM

## 2021-04-19 DIAGNOSIS — Z80.3 FAMILY HISTORY OF MALIGNANT NEOPLASM OF BREAST: ICD-10-CM

## 2021-04-19 PROCEDURE — 99214 OFFICE O/P EST MOD 30 MIN: CPT | Mod: 95 | Performed by: OBSTETRICS & GYNECOLOGY

## 2021-04-19 ASSESSMENT — ENCOUNTER SYMPTOMS
ENDOCRINE NEGATIVE: 1
CONSTITUTIONAL NEGATIVE: 1
CARDIOVASCULAR NEGATIVE: 1
ALLERGIC/IMMUNOLOGIC COMMENTS: REVIEWED AND UPDATED
PSYCHIATRIC NEGATIVE: 1
EYES NEGATIVE: 1
NEUROLOGICAL NEGATIVE: 1
RESPIRATORY NEGATIVE: 1
GASTROINTESTINAL NEGATIVE: 1
MUSCULOSKELETAL NEGATIVE: 1

## 2021-04-19 NOTE — ASSESSMENT & PLAN NOTE
Patient counseled of findings to include ovarian cancer screening test reflecting low risk designation and pelvic ultrasound with persistent left ovarian cyst which is 1 cm smaller over the previous observation.  Patient further counseled of no concern regarding right ovary based postmenopausal state and visualization at time of previous surgery.  Recommended follow-up in 6-month ultrasound and no further blood test which was discussed with GYN oncology.

## 2021-04-19 NOTE — PROGRESS NOTES
Verification of Patient Location:  The patient affirms they are currently located in the following state:[place]     Request for Consent:  You and I are about to have a telemedicine check-in or visit. This is allowed because you are already my patient, and you have requested it.  This telemedicine visit will be billed to your health insurance or you, if you are self-insured.  You understand you will be responsible for any copayments or coinsurances that apply to your telemedicine visit.  Before starting our telemedicine visit, I am required to get your consent for this virtual check-in or visit by telemedicine. Do you consent?      Patient Response to Request for Consent: Yes    The following have been reviewed and updated as appropriate in this visit:         Visit Documentation:  Patient ID: Madonna Tipton   : 1965  MRN: [id number]   Visit Date: 2021    Subjective   Madonna Tipton is presenting today for No chief complaint on file.      HPI:  Amber is a 54 yo WF with history of TLH/BS 2018 for large fibroid uterus subsequently noted to have left ovarian cyst.  Patient asymptomatic and CA-125 low risk as well morphology index.  Subsequent Ova1 reflex to Overa and ultrasound.  Patient following up to discuss results and the plan.  She denies any constitutional symptoms including nausea, vomiting, bloating, change in bowel or bladder habits.  Her weight has been stable.  And denies any further concerns.      Vital Signs for this encounter:   Visit Vitals  LMP 2018       Obstetric History:   OB History        2    Para   1    Term                AB   1    Living   1       SAB        TAB        Ectopic        Multiple        Live Births                   Past Medical History:  has a past medical history of Abnormal Pap smear of cervix, Anxiety, Asthma, Body mass index (BMI) greater than 99th percentile for age in overweight child, Chlamydia, H/O mammogram (2017),  Palpitations, Personal history of urinary disorder, Rheumatoid arthritis (CMS/HCC), Sensation of pressure in bladder area, and Urinary retention with incomplete bladder emptying.  Past Surgical History:  has a past surgical history that includes Colonoscopy (2016); Hernia repair (12/2016); Myomectomy; and Laparoscopic total hysterectomy (N/A, 06/28/2018).  Family History: family history includes Breast cancer in her biological mother; COPD in her maternal grandmother; Colon cancer in her maternal grandfather.  Social History:  reports that she has quit smoking. Her smoking use included cigarettes. She has never used smokeless tobacco. She reports current alcohol use of about 7.0 standard drinks of alcohol per week. She reports that she does not use drugs.  Medications:   Current Outpatient Medications:   •  budesonide-formoteroL (SYMBICORT) 80-4.5 mcg/actuation inhaler, Inhale 2 puffs 2 (two) times a day. Rinse mouth with water after use to reduce aftertaste and incidence of candidiasis. Do not swallow., Disp: 10.2 Inhaler, Rfl: 2  •  hydrocortisone-iodoquinoL (VYTONE) 1-1 % cream, Apply topically 3 (three) times a day as needed (for itching)., Disp: 1 Tube, Rfl: 0  •  LORazepam (ATIVAN) 0.5 mg tablet, Take 1 tablet (0.5 mg total) by mouth every 6 (six) hours as needed for anxiety., Disp: 30 tablet, Rfl: 0  •  montelukast (SINGULAIR) 10 mg tablet, TAKE 1 TABLET BY MOUTH EVERY DAY, Disp: 30 tablet, Rfl: 2  •  PROAIR HFA 90 mcg/actuation inhaler, Inhale 2 puffs 4 (four) times a day as needed for shortness of breath., Disp: 1 Inhaler, Rfl: 2  •  sertraline (ZOLOFT) 50 mg tablet, Take 50 mg by mouth once daily., Disp: , Rfl:   •  sertraline (ZOLOFT) 50 mg tablet, Take 1 tablet (50 mg total) by mouth once daily., Disp: 30 tablet, Rfl: 6    Allergies: is allergic to feathers, mold, and shellfish derived.       Review of Systems   Constitutional: Negative.    HENT: Negative.    Eyes: Negative.    Respiratory: Negative.     Cardiovascular: Negative.    Gastrointestinal: Negative.    Endocrine: Negative.    Genitourinary:        As per HPI   Musculoskeletal: Negative.    Skin: Negative.    Allergic/Immunologic:        Reviewed and updated   Neurological: Negative.    Psychiatric/Behavioral: Negative.      Medical Assistant present throughout exam: NA  Physical Exam  Constitutional:       Appearance: Normal appearance.   Genitourinary:   Pelvic exam deferred.Neurological:      General: No focal deficit present.      Mental Status: She is alert and oriented to person, place, and time.   Psychiatric:         Mood and Affect: Mood normal.         Behavior: Behavior normal.       Assessment/Plan   Problem List Items Addressed This Visit        Genitourinary    Cyst of left ovary - Primary     Patient counseled of findings to include ovarian cancer screening test reflecting low risk designation and pelvic ultrasound with persistent left ovarian cyst which is 1 cm smaller over the previous observation.  Patient further counseled of no concern regarding right ovary based postmenopausal state and visualization at time of previous surgery.  Recommended follow-up in 6-month ultrasound and no further blood test which was discussed with GYN oncology.         Relevant Orders    US PELVIS TRANSABDOMINAL & TRANSVAGINAL       Other    Family history of malignant neoplasm of breast     Continue screening mammograms and review at annual visit.                    The following have been marked reviewed and updated as appropriate in this visit:       Return in about 6 months (around 10/19/2021) for Annual GYN Exam.  Ciro Monroe MD          Pelvic US 3.29.21  Uterus: Status post hysterectomy.     Right ovary: The right ovary is not identified.  No discrete adnexal mass is  identified.     Left ovary: The left ovary measures 5.2 x 4.3 x 4.7  cm.   There is a 4.4 x 3.6  x 3.6 cm cyst with a thin internal septation.  There is no evidence of  abnormal  internal vascularity or new internal soft tissue density.  Overall, the cyst is  decreased in size since 7/20/2020 when it measured 5.4 x 4.0 x 4.0 cm in maximal  dimension.     Peritoneum: There is no free fluid in the pelvis.     Comment: Transvaginal ultrasound evaluation best delineates the left ovary.     --  IMPRESSION:  1.  4.4 cm left ovarian cyst with thin internal septation and no additional  suspicious features, which is decreased in size since 7/20/2020.            Time Spent:  I spent 30 minutes on this date of service performing the following activities: obtaining history, documenting, preparing for visit, providing counseling and education, independently reviewing study/studies and communicating results.

## 2021-04-23 ENCOUNTER — TELEPHONE (OUTPATIENT)
Dept: SURGERY | Facility: CLINIC | Age: 56
End: 2021-04-23

## 2021-04-23 NOTE — TELEPHONE ENCOUNTER
Called patient regarding her message and she is waiting to decide what she would like to do regarding her imaging.

## 2021-05-31 DIAGNOSIS — N89.8 VAGINAL ITCHING: ICD-10-CM

## 2021-06-02 RX ORDER — HYDROCORTISONE, IODOQUINOL 10; 10 MG/G; MG/G
CREAM TOPICAL 3 TIMES DAILY PRN
Qty: 45 G | Refills: 0 | Status: SHIPPED | OUTPATIENT
Start: 2021-06-02 | End: 2021-06-09 | Stop reason: SDUPTHER

## 2021-06-09 DIAGNOSIS — N89.8 VAGINAL ITCHING: ICD-10-CM

## 2021-06-09 RX ORDER — HYDROCORTISONE, IODOQUINOL 10; 10 MG/G; MG/G
CREAM TOPICAL 3 TIMES DAILY PRN
Qty: 45 G | Refills: 0 | Status: SHIPPED | OUTPATIENT
Start: 2021-06-09 | End: 2024-12-04 | Stop reason: ALTCHOICE

## 2021-06-18 ENCOUNTER — OFFICE VISIT (OUTPATIENT)
Dept: PRIMARY CARE | Facility: CLINIC | Age: 56
End: 2021-06-18
Payer: COMMERCIAL

## 2021-06-18 VITALS
DIASTOLIC BLOOD PRESSURE: 80 MMHG | BODY MASS INDEX: 25.33 KG/M2 | WEIGHT: 152 LBS | OXYGEN SATURATION: 98 % | TEMPERATURE: 98.7 F | HEART RATE: 83 BPM | SYSTOLIC BLOOD PRESSURE: 132 MMHG | HEIGHT: 65 IN

## 2021-06-18 DIAGNOSIS — Z00.00 ENCOUNTER FOR PREVENTATIVE ADULT HEALTH CARE EXAMINATION: ICD-10-CM

## 2021-06-18 DIAGNOSIS — J45.20 MILD INTERMITTENT ASTHMA WITHOUT COMPLICATION: ICD-10-CM

## 2021-06-18 DIAGNOSIS — E55.9 MILD VITAMIN D DEFICIENCY: ICD-10-CM

## 2021-06-18 DIAGNOSIS — Z00.00 PREVENTATIVE HEALTH CARE: Primary | ICD-10-CM

## 2021-06-18 DIAGNOSIS — F41.9 ANXIETY: ICD-10-CM

## 2021-06-18 DIAGNOSIS — R00.0 TACHYCARDIA: ICD-10-CM

## 2021-06-18 PROBLEM — H65.03 BILATERAL ACUTE SEROUS OTITIS MEDIA: Status: RESOLVED | Noted: 2019-04-01 | Resolved: 2021-06-18

## 2021-06-18 PROBLEM — N83.202 CYST OF LEFT OVARY: Status: RESOLVED | Noted: 2019-03-06 | Resolved: 2021-06-18

## 2021-06-18 PROBLEM — J06.9 UPPER RESPIRATORY TRACT INFECTION: Status: RESOLVED | Noted: 2019-04-01 | Resolved: 2021-06-18

## 2021-06-18 PROCEDURE — 99396 PREV VISIT EST AGE 40-64: CPT | Performed by: INTERNAL MEDICINE

## 2021-06-18 PROCEDURE — 3008F BODY MASS INDEX DOCD: CPT | Performed by: INTERNAL MEDICINE

## 2021-06-18 RX ORDER — SERTRALINE HYDROCHLORIDE 50 MG/1
50 TABLET, FILM COATED ORAL
Qty: 30 TABLET | Refills: 6 | Status: SHIPPED | OUTPATIENT
Start: 2021-06-18 | End: 2022-01-11

## 2021-06-18 ASSESSMENT — ENCOUNTER SYMPTOMS
SLEEP DISTURBANCE: 0
FATIGUE: 0
DIZZINESS: 0
DYSPHORIC MOOD: 0
BLOOD IN STOOL: 0
HEMATURIA: 0
WHEEZING: 0
ARTHRALGIAS: 0
MYALGIAS: 0
APPETITE CHANGE: 0
ACTIVITY CHANGE: 0
FEVER: 0
HEADACHES: 0
PALPITATIONS: 1
CHEST TIGHTNESS: 0
NERVOUS/ANXIOUS: 0
BACK PAIN: 0
LIGHT-HEADEDNESS: 0
SHORTNESS OF BREATH: 0

## 2021-06-18 ASSESSMENT — PATIENT HEALTH QUESTIONNAIRE - PHQ9: SUM OF ALL RESPONSES TO PHQ9 QUESTIONS 1 & 2: 0

## 2021-06-18 NOTE — ASSESSMENT & PLAN NOTE
Palpitations, occ.   Echo and EKGs wnl, per pt.   On beta blockers PRN use-- not taking. Not on our list.

## 2021-06-18 NOTE — ASSESSMENT & PLAN NOTE
Reviewed vaccinations, diet, exercise, work setting; patient Medical, Surgical, FH, SH, allergies.   FH mother Breast CA. Followed by breast specialist.   Father HTN.  COVID UTD.  Ordered pertinent labs.  Discussed continue to exercise regularly and practice healthy dietary choices.

## 2021-06-18 NOTE — PROGRESS NOTES
[facility] in [place]  Dr. Erma Guillen  [address], [address]  [place] [address]  Phone: [phone]  Fax: [phone]        Patient ID: Madonna Tipton                              : 1965    Visit Date: 2021    Chief Complaint: Med Management      Patient ID: Madonna Tipton is a 56 y.o. female.    HPI  Feels well.     Med refill for zoloft.       Screening:    Walks daily. Works out once a week.     Diet: trying to eat healthy     Mood: No concerns     Occupation: [employer]     Living Situation: lives with family     Dental: no concerns     Eye Check: wears glasses       Smoking: none    Alcohol: 2/ day    Colonoscopy  At 51  Mammogram UTD  Cervical Cancer Screening - [facility]    Vaccinations:  COVID UTD  DTaP, Tdap, and Td Vaccines(2 - Td) UTD   Zoster Vaccine  Due -will get at [facility]          Patient Active Problem List   Diagnosis   • At high risk for breast cancer   • History of hysterectomy for indication other than cancer   • Mild asthma   • Mild vitamin D deficiency   • Well woman exam with routine gynecological exam   • Breast mass   • Anxiety   • Tachycardia   • Urinary retention   • Family history of malignant neoplasm of breast   • Encounter for preventative adult health care examination       Past Medical History:   Diagnosis Date   • Abnormal Pap smear of cervix    • Anxiety    • Asthma    • Body mass index (BMI) greater than 99th percentile for age in overweight child    • Chlamydia    • H/O mammogram 2017   • Palpitations    • Personal history of urinary disorder    • Rheumatoid arthritis (CMS/HCC)    • Sensation of pressure in bladder area    • Urinary retention with incomplete bladder emptying        Past Surgical History:   Procedure Laterality Date   • COLONOSCOPY     • HERNIA REPAIR  2016   • LAPAROSCOPIC TOTAL HYSTERECTOMY N/A 2018    [facility], soren salpingectomy, cystoscopy   • MYOMECTOMY           Current Outpatient Medications:   •   budesonide-formoteroL (SYMBICORT) 80-4.5 mcg/actuation inhaler, Inhale 2 puffs 2 (two) times a day. Rinse mouth with water after use to reduce aftertaste and incidence of candidiasis. Do not swallow., Disp: 10.2 Inhaler, Rfl: 2  •  hydrocortisone-iodoquinoL (VYTONE) 1-1 % cream, Apply topically 3 (three) times a day as needed (for itching)., Disp: 45 g, Rfl: 0  •  montelukast (SINGULAIR) 10 mg tablet, TAKE 1 TABLET BY MOUTH EVERY DAY, Disp: 30 tablet, Rfl: 2  •  PROAIR HFA 90 mcg/actuation inhaler, Inhale 2 puffs 4 (four) times a day as needed for shortness of breath., Disp: 1 Inhaler, Rfl: 2  •  LORazepam (ATIVAN) 0.5 mg tablet, Take 1 tablet (0.5 mg total) by mouth every 6 (six) hours as needed for anxiety. (Patient not taking: Reported on 6/18/2021 ), Disp: 30 tablet, Rfl: 0  •  sertraline (ZOLOFT) 50 mg tablet, Take 1 tablet (50 mg total) by mouth once daily., Disp: 30 tablet, Rfl: 6    Allergies   Allergen Reactions   • Feathers Shortness of breath   • Mold Shortness of breath   • Shellfish Derived GI intolerance       Family History   Problem Relation Age of Onset   • Colon cancer Maternal Grandfather    • Breast cancer Biological Mother    • COPD Maternal Grandmother        Social History     Tobacco Use   • Smoking status: Former Smoker     Types: Cigarettes   • Smokeless tobacco: Never Used   Substance Use Topics   • Alcohol use: Yes     Alcohol/week: 7.0 standard drinks     Types: 7 Glasses of wine per week   • Drug use: No       Health Maintenance   Topic Date Due   • Zoster Vaccine (1 of 2) Never done   • Pneumococcal (1 of 2 - PPSV23) 07/31/2021 (Originally 5/27/1971)   • Hepatitis C Screening  10/06/2021 (Originally 5/27/1984)   • Varicella Vaccines (1 of 2 - 2-dose childhood series) 03/07/2031 (Originally 5/27/1967)   • HIV Screening  05/22/2050 (Originally 5/27/1979)   • DTaP, Tdap, and Td Vaccines (2 - Td or Tdap) 06/27/2022   • Mammogram  07/20/2022   • Colonoscopy  03/31/2027   • Influenza Vaccine  " Completed   • COVID-19 Vaccine  Completed   • Meningococcal ACWY  Aged Out   • HIB Vaccines  Aged Out   • IPV Vaccines  Aged Out   • HPV Vaccines  Aged Out         The following have been reviewed and updated as appropriate in this visit:  Tobacco  Allergies  Meds  Problems  Med Hx  Surg Hx  Fam Hx         Review of System  Review of Systems   Constitutional: Negative for activity change, appetite change, fatigue and fever.   HENT: Negative for dental problem.    Eyes: Negative for visual disturbance.   Respiratory: Negative for chest tightness, shortness of breath and wheezing.    Cardiovascular: Positive for palpitations (seen card). Negative for chest pain and leg swelling.   Gastrointestinal: Negative for blood in stool.   Genitourinary: Negative for hematuria.   Musculoskeletal: Negative for arthralgias, back pain and myalgias.   Allergic/Immunologic: Positive for environmental allergies.   Neurological: Negative for dizziness, light-headedness and headaches.   Psychiatric/Behavioral: Negative for dysphoric mood and sleep disturbance. The patient is not nervous/anxious.        Objective     Vitals  Vitals:    06/18/21 0903   BP: 132/80   BP Location: Left upper arm   Patient Position: Sitting   Pulse: 83   Temp: 37.1 °C (98.7 °F)   SpO2: 98%   Weight: 68.9 kg (152 lb)   Height: 1.651 m (5' 5\")       Body mass index is 25.29 kg/m².    Physical Exam  Physical Exam  Vitals reviewed.   Constitutional:       General: She is not in acute distress.     Appearance: Normal appearance. She is well-developed. She is not diaphoretic.   HENT:      Head: Normocephalic and atraumatic.      Right Ear: External ear normal.      Left Ear: External ear normal.      Nose: Nose normal. No congestion.      Mouth/Throat:      Mouth: Mucous membranes are moist.      Pharynx: Oropharynx is clear.   Eyes:      General: No scleral icterus.        Right eye: No discharge.         Left eye: No discharge.      Conjunctiva/sclera: " Conjunctivae normal.      Pupils: Pupils are equal, round, and reactive to light.   Neck:      Thyroid: No thyromegaly.      Vascular: No JVD.      Trachea: No tracheal deviation.   Cardiovascular:      Rate and Rhythm: Normal rate and regular rhythm.      Heart sounds: Normal heart sounds. No murmur heard.   No friction rub. No gallop.    Pulmonary:      Effort: Pulmonary effort is normal. No respiratory distress.      Breath sounds: Normal breath sounds. No wheezing or rales.   Abdominal:      General: There is no distension.      Palpations: Abdomen is soft.      Tenderness: There is no abdominal tenderness. There is no right CVA tenderness, left CVA tenderness or guarding.   Musculoskeletal:         General: Normal range of motion.      Cervical back: Normal range of motion and neck supple. No tenderness.      Right lower leg: No edema.      Left lower leg: No edema.   Lymphadenopathy:      Cervical: No cervical adenopathy.   Skin:     General: Skin is warm and dry.      Findings: No erythema or rash.   Neurological:      Mental Status: She is alert and oriented to person, place, and time.      Cranial Nerves: No cranial nerve deficit.      Sensory: No sensory deficit.      Motor: No abnormal muscle tone.      Coordination: Coordination normal.      Deep Tendon Reflexes: Reflexes normal.   Psychiatric:         Behavior: Behavior normal.         Thought Content: Thought content normal.         Judgment: Judgment normal.         Assessment/Plan     Problem List Items Addressed This Visit        Unprioritized    Mild asthma     No recent flares.            Mild vitamin D deficiency     In past.   Not taking Vit D PO.  Will check level.          Relevant Orders    Vitamin D 25 hydroxy    Anxiety    Relevant Medications    sertraline (ZOLOFT) 50 mg tablet    Tachycardia     Palpitations, occ.   Echo and EKGs wnl, per pt.   On beta blockers PRN use-- not taking. Not on our list.          Encounter for preventative  adult health care examination     Reviewed vaccinations, diet, exercise, work setting; patient Medical, Surgical, FH, SH, allergies.   FH mother Breast CA. Followed by breast specialist.   Father HTN.  COVID UTD.  Ordered pertinent labs.  Discussed continue to exercise regularly and practice healthy dietary choices.              Other Visit Diagnoses     Preventative health care    -  Primary    Relevant Orders    Comprehensive metabolic panel    CBC and differential    TSH w reflex FT4    Urinalysis with Reflex Culture    Lipid panel          Return in about 6 months (around 12/18/2021).      Erma Guillen MD  6/18/2021

## 2021-07-08 DIAGNOSIS — J45.909 MILD ASTHMA, UNSPECIFIED WHETHER COMPLICATED, UNSPECIFIED WHETHER PERSISTENT: ICD-10-CM

## 2021-07-08 NOTE — TELEPHONE ENCOUNTER
Medicine Refill Request    Last Office Visit: 8/28/2019  Last Telemedicine Visit: 4/22/2020 Brenda Agustin MD    Next Office Visit: Visit date not found  Next Telemedicine Visit: Visit date not found         Current Outpatient Medications:   •  budesonide-formoteroL (SYMBICORT) 80-4.5 mcg/actuation inhaler, Inhale 2 puffs 2 (two) times a day. Rinse mouth with water after use to reduce aftertaste and incidence of candidiasis. Do not swallow., Disp: 10.2 Inhaler, Rfl: 2  •  hydrocortisone-iodoquinoL (VYTONE) 1-1 % cream, Apply topically 3 (three) times a day as needed (for itching)., Disp: 45 g, Rfl: 0  •  LORazepam (ATIVAN) 0.5 mg tablet, Take 1 tablet (0.5 mg total) by mouth every 6 (six) hours as needed for anxiety. (Patient not taking: Reported on 6/18/2021 ), Disp: 30 tablet, Rfl: 0  •  montelukast (SINGULAIR) 10 mg tablet, TAKE 1 TABLET BY MOUTH EVERY DAY, Disp: 30 tablet, Rfl: 2  •  PROAIR HFA 90 mcg/actuation inhaler, Inhale 2 puffs 4 (four) times a day as needed for shortness of breath., Disp: 1 Inhaler, Rfl: 2  •  sertraline (ZOLOFT) 50 mg tablet, Take 1 tablet (50 mg total) by mouth once daily., Disp: 30 tablet, Rfl: 6      BP Readings from Last 3 Encounters:   06/18/21 132/80   10/06/20 124/80   07/20/20 (!) 143/84       Recent Lab results:  No results found for: CHOL, No results found for: HDL, No results found for: LDLCALC, No results found for: TRIG     Lab Results   Component Value Date    GLUCOSE 113 (H) 11/05/2018   , No results found for: HGBA1C      Lab Results   Component Value Date    CREATININE 0.72 11/05/2018       Lab Results   Component Value Date    TSH 1.180 11/05/2018

## 2021-07-09 LAB
25(OH)D3+25(OH)D2 SERPL-MCNC: 28 NG/ML (ref 30–100)
ALBUMIN SERPL-MCNC: 4.9 G/DL (ref 3.8–4.9)
ALBUMIN/GLOB SERPL: 2.2 {RATIO} (ref 1.2–2.2)
ALP SERPL-CCNC: 56 IU/L (ref 48–121)
ALT SERPL-CCNC: 19 IU/L (ref 0–32)
APPEARANCE UR: CLEAR
AST SERPL-CCNC: 21 IU/L (ref 0–40)
BACTERIA #/AREA URNS HPF: NORMAL /[HPF]
BASOPHILS # BLD AUTO: 0.1 X10E3/UL (ref 0–0.2)
BASOPHILS NFR BLD AUTO: 1 %
BILIRUB SERPL-MCNC: 0.6 MG/DL (ref 0–1.2)
BILIRUB UR QL STRIP: NEGATIVE
BUN SERPL-MCNC: 15 MG/DL (ref 6–24)
BUN/CREAT SERPL: 22 (ref 9–23)
CALCIUM SERPL-MCNC: 9.8 MG/DL (ref 8.7–10.2)
CASTS URNS QL MICRO: NORMAL /LPF
CHLORIDE SERPL-SCNC: 95 MMOL/L (ref 96–106)
CHOLEST SERPL-MCNC: 302 MG/DL (ref 100–199)
CO2 SERPL-SCNC: 23 MMOL/L (ref 20–29)
COLOR UR: YELLOW
CREAT SERPL-MCNC: 0.69 MG/DL (ref 0.57–1)
EOSINOPHIL # BLD AUTO: 0.1 X10E3/UL (ref 0–0.4)
EOSINOPHIL NFR BLD AUTO: 1 %
EPI CELLS #/AREA URNS HPF: NORMAL /HPF (ref 0–10)
ERYTHROCYTE [DISTWIDTH] IN BLOOD BY AUTOMATED COUNT: 12.2 % (ref 11.7–15.4)
GLOBULIN SER CALC-MCNC: 2.2 G/DL (ref 1.5–4.5)
GLUCOSE SERPL-MCNC: 104 MG/DL (ref 65–99)
GLUCOSE UR QL: NEGATIVE
HCT VFR BLD AUTO: 42.4 % (ref 34–46.6)
HDLC SERPL-MCNC: 106 MG/DL
HGB BLD-MCNC: 14.2 G/DL (ref 11.1–15.9)
HGB UR QL STRIP: NEGATIVE
IMM GRANULOCYTES # BLD AUTO: 0 X10E3/UL (ref 0–0.1)
IMM GRANULOCYTES NFR BLD AUTO: 0 %
KETONES UR QL STRIP: ABNORMAL
LAB CORP EGFR IF AFRICN AM: 113 ML/MIN/1.73
LAB CORP EGFR IF NONAFRICN AM: 98 ML/MIN/1.73
LAB CORP URINALYSIS REFLEX: ABNORMAL
LDLC SERPL CALC-MCNC: 190 MG/DL (ref 0–99)
LEUKOCYTE ESTERASE UR QL STRIP: NEGATIVE
LYMPHOCYTES # BLD AUTO: 1.3 X10E3/UL (ref 0.7–3.1)
LYMPHOCYTES NFR BLD AUTO: 13 %
MCH RBC QN AUTO: 32.2 PG (ref 26.6–33)
MCHC RBC AUTO-ENTMCNC: 33.5 G/DL (ref 31.5–35.7)
MCV RBC AUTO: 96 FL (ref 79–97)
MICRO URNS: ABNORMAL
MICRO URNS: ABNORMAL
MONOCYTES # BLD AUTO: 0.8 X10E3/UL (ref 0.1–0.9)
MONOCYTES NFR BLD AUTO: 8 %
NEUTROPHILS # BLD AUTO: 7.6 X10E3/UL (ref 1.4–7)
NEUTROPHILS NFR BLD AUTO: 77 %
NITRITE UR QL STRIP: NEGATIVE
PH UR STRIP: 6.5 [PH] (ref 5–7.5)
PLATELET # BLD AUTO: 224 X10E3/UL (ref 150–450)
POTASSIUM SERPL-SCNC: 4.3 MMOL/L (ref 3.5–5.2)
PROT SERPL-MCNC: 7.1 G/DL (ref 6–8.5)
PROT UR QL STRIP: NEGATIVE
RBC # BLD AUTO: 4.41 X10E6/UL (ref 3.77–5.28)
RBC #/AREA URNS HPF: NORMAL /HPF (ref 0–2)
SODIUM SERPL-SCNC: 135 MMOL/L (ref 134–144)
SP GR UR: 1.02 (ref 1–1.03)
T4 FREE SERPL-MCNC: 1.05 NG/DL (ref 0.82–1.77)
TRIGL SERPL-MCNC: 50 MG/DL (ref 0–149)
TSH SERPL DL<=0.005 MIU/L-ACNC: 0.97 UIU/ML (ref 0.45–4.5)
UROBILINOGEN UR STRIP-MCNC: 0.2 MG/DL (ref 0.2–1)
VLDLC SERPL CALC-MCNC: 6 MG/DL (ref 5–40)
WBC # BLD AUTO: 9.8 X10E3/UL (ref 3.4–10.8)
WBC #/AREA URNS HPF: NORMAL /HPF (ref 0–5)

## 2021-07-09 RX ORDER — MONTELUKAST SODIUM 10 MG/1
TABLET ORAL
Qty: 30 TABLET | Refills: 2 | Status: SHIPPED | OUTPATIENT
Start: 2021-07-09 | End: 2021-10-06

## 2021-07-21 ENCOUNTER — HOSPITAL ENCOUNTER (OUTPATIENT)
Dept: RADIOLOGY | Age: 56
Discharge: HOME | End: 2021-07-21
Attending: NURSE PRACTITIONER
Payer: COMMERCIAL

## 2021-07-21 ENCOUNTER — TELEMEDICINE (OUTPATIENT)
Dept: PRIMARY CARE | Facility: CLINIC | Age: 56
End: 2021-07-21
Payer: COMMERCIAL

## 2021-07-21 VITALS — BODY MASS INDEX: 24.99 KG/M2 | WEIGHT: 150 LBS | HEIGHT: 65 IN

## 2021-07-21 DIAGNOSIS — Z91.89 AT HIGH RISK FOR BREAST CANCER: ICD-10-CM

## 2021-07-21 DIAGNOSIS — Z80.3 FAMILY HISTORY OF MALIGNANT NEOPLASM OF BREAST: ICD-10-CM

## 2021-07-21 DIAGNOSIS — R73.9 HYPERGLYCEMIA: Primary | ICD-10-CM

## 2021-07-21 DIAGNOSIS — E78.00 HYPERCHOLESTEROLEMIA: ICD-10-CM

## 2021-07-21 DIAGNOSIS — Z12.31 ENCOUNTER FOR SCREENING MAMMOGRAM FOR HIGH-RISK PATIENT: ICD-10-CM

## 2021-07-21 PROCEDURE — 3008F BODY MASS INDEX DOCD: CPT | Performed by: INTERNAL MEDICINE

## 2021-07-21 PROCEDURE — 77063 BREAST TOMOSYNTHESIS BI: CPT

## 2021-07-21 PROCEDURE — 99214 OFFICE O/P EST MOD 30 MIN: CPT | Mod: 95 | Performed by: INTERNAL MEDICINE

## 2021-07-21 PROCEDURE — 77067 SCR MAMMO BI INCL CAD: CPT

## 2021-07-21 NOTE — ASSESSMENT & PLAN NOTE
No FH.  .  Was 114 in 2018.  Follows low carb diet.   Discussed importance of maintaining musc mass with strength training - which should help improve BGs.  Cont low carb, but avoid excess sat fats.

## 2021-07-21 NOTE — PROGRESS NOTES
Verification of Patient Location:  The patient affirms they are currently located in the following state: [place]    Request for Consent:    Audio and Video Encounter   Mei, my name is Erma Guillen MD.  Before we proceed, can you please verify your identification by telling me your full name and date of birth?  Can you tell me who is in the room with you?    You and I are about to have a telemedicine check-in or visit because you have requested it.  This is a live video-conference.  I am a real person, speaking to you in real time.  There is no one else with me on the video-conference.  However, when we use ([employer], [employer], etc) it is important for you to know that the video-conference may not be secure or private.  I am not recording this conversation and I am asking you not to record it.  This telemedicine visit will be billed to your health insurance or you, if you are self-insured.  You understand you will be responsible for any copayments or coinsurances that apply to your telemedicine visit.  Communication platform used for this encounter:  [employer]     Before starting our telemedicine visit, I am required to get your consent for this virtual check-in or visit by telemedicine. Do you consent?      Patient Response to Request for Consent:  Yes      Visit Documentation:  Subjective     Patient ID: Madonna Tipton is a 56 y.o. female.  1965      HPI    The following have been reviewed and updated as appropriate in this visit:  Tobacco  Allergies  Meds  Problems  Med Hx  Surg Hx  Fam Hx  Soc Hx        Review of Systems  Negative.     Video Exam:  Atraumatic, normocephalic.  Eyes no discharge, redness; ears, nose visually normal.   Breathing comfortably, talking in complete sentences.   No obvious distress. No wheezing.   Mental status normal, visible cranial nerves normal.  No accessory muscle use.       Assessment/Plan   Diagnoses and all orders for this visit:    Hyperglycemia  (Primary)  Assessment & Plan:  No FH.  .  Was 114 in 2018.  Follows low carb diet.   Discussed importance of maintaining musc mass with strength training - which should help improve BGs.  Cont low carb, but avoid excess sat fats.     Orders:  -     Comprehensive metabolic panel; Future  -     Hemoglobin A1c; Future    Hypercholesterolemia  Assessment & Plan:  Results for HIRA AUSTIN (MRN [id number]) as of 7/21/2021 08:32   Ref. Range 7/8/2021 09:18   Cholesterol Latest Ref Range: 100 - 199 mg/dL 302 (H)   Triglycerides Latest Ref Range: 0 - 149 mg/dL 50   HDL Latest Ref Range: >39 mg/dL 106   LDL Calculated Latest Ref Range: 0 - 99 mg/dL 190 (H)     Discussed high HDL protective to heart.   Calc ASCVD 1.63%.  FH - aunt with CHF. Uncle with CAD; father did not.   Mother with no CAD hx.   Labs in range except for slightly elevated LDL (bad cholesterol).   Does eat egg, cheese, shrimp. OK to eat in moderation.   Plan diet low in saturated fats/red meats/dairy; and more of whole grains, veggies, unsaturated fats.   Extensive discussion re consideration of CT CAC Score. Currently low risk but would keep in mind borderline high BGs/ ?pre-diabetes.   Weight normal.  Repeat labs in 3-4 months. Reassess ASCVD risk, eval for CT CAC score.      Orders:  -     Lipid panel; Future    I have personally reviewed the laboratory reports: CBC, CMP, cholesterol panel.   Discussed with patient.       Time Spent:  I spent 32 minutes on this date of service performing the following activities: obtaining history, entering orders, preparing for visit, obtaining / reviewing records, providing counseling and education, independently reviewing study/studies and communicating results.

## 2021-07-21 NOTE — ASSESSMENT & PLAN NOTE
Results for HIRA AUSTIN (MRN [id number]) as of 7/21/2021 08:32   Ref. Range 7/8/2021 09:18   Cholesterol Latest Ref Range: 100 - 199 mg/dL 302 (H)   Triglycerides Latest Ref Range: 0 - 149 mg/dL 50   HDL Latest Ref Range: >39 mg/dL 106   LDL Calculated Latest Ref Range: 0 - 99 mg/dL 190 (H)     Discussed high HDL protective to heart.   Calc ASCVD 1.63%.  FH - aunt with CHF. Uncle with CAD; father did not.   Mother with no CAD hx.   Labs in range except for slightly elevated LDL (bad cholesterol).   Does eat egg, cheese, shrimp. OK to eat in moderation.   Plan diet low in saturated fats/red meats/dairy; and more of whole grains, veggies, unsaturated fats.   Extensive discussion re consideration of CT CAC Score. Currently low risk but would keep in mind borderline high BGs/ ?pre-diabetes.   Weight normal.  Repeat labs in 3-4 months. Reassess ASCVD risk, eval for CT CAC score.

## 2021-07-22 ENCOUNTER — TELEPHONE (OUTPATIENT)
Dept: SURGERY | Facility: CLINIC | Age: 56
End: 2021-07-22

## 2021-07-22 DIAGNOSIS — Z91.89 AT HIGH RISK FOR BREAST CANCER: Primary | ICD-10-CM

## 2021-07-22 NOTE — TELEPHONE ENCOUNTER
Called pt re: mmg - benign results. She will get an MRI in December. She will make an appt to see Dr. Herring in the next month as she is overdue for an in-person clinical exam.    All questions answered.    Maris Patel MD  Breast Surgery Fellow

## 2021-08-24 ENCOUNTER — TELEPHONE (OUTPATIENT)
Dept: GYNECOLOGIC ONCOLOGY | Facility: CLINIC | Age: 56
End: 2021-08-24

## 2021-08-24 NOTE — TELEPHONE ENCOUNTER
Called patient back to discuss she reports new symptoms of dull bilateral pelvic pain for the last 1-2 weeks. Denies any other associated symptoms such as nausea, vomiting, severe abdominal/pelvic pain,bloating, change in bowel/bladder habits, fevers, chills, vaginal discharge, vaginal bleeding. Patient is s/p hysterectomy and is being followed for 4.4 cm ovarian cyst. Last ultrasound was march 2021. Instructed patient to schedule an appointment for ultrasound at earliest convenience; after ultrasound is scheduled she was instructed to call our office back to set up follow-up appointment to discuss. Instructed her to go to ED if symptoms worsen or new symptoms arise. Patient is aware and understands the plan.

## 2021-08-24 NOTE — TELEPHONE ENCOUNTER
Pt would like to know if she can get in to see Dr. Monroe tomorrow having severe pain in her lower abdomen.  No bleeding.

## 2021-08-25 ENCOUNTER — HOSPITAL ENCOUNTER (OUTPATIENT)
Dept: RADIOLOGY | Facility: HOSPITAL | Age: 56
Discharge: HOME | End: 2021-08-25
Attending: OBSTETRICS & GYNECOLOGY
Payer: COMMERCIAL

## 2021-08-25 DIAGNOSIS — N83.202 CYST OF LEFT OVARY: ICD-10-CM

## 2021-08-25 PROCEDURE — 76856 US EXAM PELVIC COMPLETE: CPT

## 2021-08-25 PROCEDURE — 76830 TRANSVAGINAL US NON-OB: CPT

## 2021-09-08 ENCOUNTER — OFFICE VISIT (OUTPATIENT)
Dept: SURGERY | Facility: CLINIC | Age: 56
End: 2021-09-08
Payer: COMMERCIAL

## 2021-09-08 VITALS
BODY MASS INDEX: 24.99 KG/M2 | HEART RATE: 93 BPM | WEIGHT: 150 LBS | OXYGEN SATURATION: 99 % | HEIGHT: 65 IN | TEMPERATURE: 97.2 F

## 2021-09-08 DIAGNOSIS — Z91.89 AT HIGH RISK FOR BREAST CANCER: Primary | ICD-10-CM

## 2021-09-08 PROCEDURE — 99213 OFFICE O/P EST LOW 20 MIN: CPT | Performed by: SURGERY

## 2021-09-08 PROCEDURE — 3008F BODY MASS INDEX DOCD: CPT | Performed by: SURGERY

## 2021-09-08 RX ORDER — CHOLECALCIFEROL (VITAMIN D3) 25 MCG
1000 TABLET ORAL DAILY
COMMUNITY

## 2021-09-09 NOTE — PROGRESS NOTES
Patient ID: Madonna Tipton                              : 1965    Visit Date: 2021  Referring Provider: No ref. provider found  PCP: Erma Guillen MD  GYN: Patient Care Team:  Kevin Vasquez MD as Consulting Physician (Gynecologic Oncology)    Subjective     Chief Complaint: Follow-up      Madonna Tipton is a 56 y.o. old female presenting today for follow-up for high risk for breast cancer.  The patient has a 18% lifetime risk on the Isa model, and 24% on the [place] model.  She has been followed with serial exams, MRI and 3D mammograms.  She has had 1 prior biopsy for benign disease in the left breast.    She did have a mother with breast cancer age 55.    Recent imaging from 2021 shows breast density type C.  There is no suspicious mass, architectural torsion or calcifications concerning for malignancy.    Her last MRI was in 2019.  This showed moderate background enhancement but no suspicious mass or non-mass enhancement were noted.    She has no current concerns about her breast.           Review of Systems   Constitutional: Negative for fatigue and unexpected weight change.   HENT: Negative for trouble swallowing and voice change.    Eyes: Negative for photophobia and visual disturbance.   Respiratory: Negative for chest tightness, shortness of breath, wheezing and stridor.    Cardiovascular: Negative for chest pain, palpitations and leg swelling.   Gastrointestinal: Negative for abdominal pain and blood in stool.   Endocrine: Negative for cold intolerance, heat intolerance and polyuria.   Genitourinary: Negative for flank pain, frequency and hematuria.   Musculoskeletal: Negative for arthralgias, back pain, joint swelling and myalgias.   Skin: Negative for color change and rash.   Neurological: Negative for syncope, weakness and headaches.   Hematological: Negative for adenopathy. Does not bruise/bleed easily.   Psychiatric/Behavioral: Negative for agitation, confusion and  "decreased concentration. The patient is not nervous/anxious.        Oncology History   Submucous and subserous leiomyoma of uterus (Resolved)   4/2/2018 Initial Diagnosis    Submucous and subserous leiomyoma of uterus          Medications: has a current medication list which includes the following prescription(s): budesonide-formoterol, cholecalciferol (vitamin d3), hydrocortisone-iodoquinol, montelukast, proair hfa, and sertraline.      Allergies: is allergic to feathers, mold, and shellfish derived.    Family History:   Family History   Problem Relation Age of Onset   • Colon cancer Maternal Grandfather    • Breast cancer Biological Mother    • COPD Maternal Grandmother        Past Medical History:  has a past medical history of Abnormal Pap smear of cervix, Anxiety, Asthma, Body mass index (BMI) greater than 99th percentile for age in overweight child, Chlamydia, H/O mammogram (05/2017), Palpitations, Personal history of urinary disorder, Sensation of pressure in bladder area, and Urinary retention with incomplete bladder emptying. She also has no past medical history of Arthritis.    Past Surgical History:  has a past surgical history that includes Colonoscopy (2016); Hernia repair (12/2016); Myomectomy; Laparoscopic total hysterectomy (N/A, 06/28/2018); and Hysterectomy.    Social History:  reports that she has quit smoking. Her smoking use included cigarettes. She has never used smokeless tobacco. She reports current alcohol use of about 7.0 standard drinks of alcohol per week. She reports that she does not use drugs.       The following have been reviewed and updated as appropriate in this visit:  Allergies  Meds  Problems         Objective   Vitals:   Visit Vitals  Pulse 93   Temp 36.2 °C (97.2 °F)   Ht 1.651 m (5' 5\")   Wt 68 kg (150 lb)   LMP 05/31/2018   SpO2 99%   BMI 24.96 kg/m²       Physical Exam     There is no supraclavicular cervical adenopathy.  There is no ask adenopathy.  The breasts are " symmetrical bilaterally.  There is dense breast tissue bilaterally but no dominant mass, erythema retraction concerning for malignancy.    Assessment/Plan   Problem List Items Addressed This Visit        Other    At high risk for breast cancer - Primary    Relevant Orders    MRI BREAST BILATERAL WITH AND WITHOUT CONTRAST        Patient high risk for breast cancer.  We recommended MRI for additional follow-up.  She will be due for mammogram and clinical exam in 1 year.       Return in about 1 year (around 9/8/2022).      Benja Herring MD

## 2021-09-20 ENCOUNTER — OFFICE VISIT (OUTPATIENT)
Dept: GYNECOLOGIC ONCOLOGY | Facility: CLINIC | Age: 56
End: 2021-09-20
Attending: OBSTETRICS & GYNECOLOGY
Payer: COMMERCIAL

## 2021-09-20 VITALS
WEIGHT: 152 LBS | SYSTOLIC BLOOD PRESSURE: 152 MMHG | BODY MASS INDEX: 25.29 KG/M2 | HEART RATE: 83 BPM | DIASTOLIC BLOOD PRESSURE: 80 MMHG | OXYGEN SATURATION: 99 %

## 2021-09-20 DIAGNOSIS — Z01.419 ENCOUNTER FOR GYNECOLOGICAL EXAMINATION WITHOUT ABNORMAL FINDING: ICD-10-CM

## 2021-09-20 DIAGNOSIS — Z12.31 ENCOUNTER FOR SCREENING MAMMOGRAM FOR MALIGNANT NEOPLASM OF BREAST: Primary | ICD-10-CM

## 2021-09-20 DIAGNOSIS — Z80.3 FAMILY HISTORY OF MALIGNANT NEOPLASM OF BREAST: ICD-10-CM

## 2021-09-20 DIAGNOSIS — N83.202 CYST OF LEFT OVARY: ICD-10-CM

## 2021-09-20 PROCEDURE — S0612 ANNUAL GYNECOLOGICAL EXAMINA: HCPCS | Performed by: OBSTETRICS & GYNECOLOGY

## 2021-09-20 ASSESSMENT — ENCOUNTER SYMPTOMS
GASTROINTESTINAL NEGATIVE: 1
CONSTITUTIONAL NEGATIVE: 1
RESPIRATORY NEGATIVE: 1

## 2021-09-20 NOTE — PROGRESS NOTES
.  Visit Date: 2021   Madonna Tipton is 56 y.o. female presenting today for Annual GYN Exam    The following have been reviewed and updated as appropriate in this visit: Allergies  Meds  Problems       Visit Vitals  BP (!) 152/80   Pulse 83   Wt 68.9 kg (152 lb)   LMP 2018   SpO2 99%   BMI 25.29 kg/m²     Menstrual History:  OB History        2    Para   1    Term                AB   1    Living   1       SAB        IAB        Ectopic        Multiple        Live Births                    Patient's last menstrual period was 2018.       Medications:   Current Outpatient Medications:   •  budesonide-formoteroL (SYMBICORT) 80-4.5 mcg/actuation inhaler, Inhale 2 puffs 2 (two) times a day. Rinse mouth with water after use to reduce aftertaste and incidence of candidiasis. Do not swallow., Disp: 10.2 Inhaler, Rfl: 2  •  cholecalciferol, vitamin D3, 1,000 unit (25 mcg) tablet, Take 1,000 Units by mouth daily., Disp: , Rfl:   •  hydrocortisone-iodoquinoL (VYTONE) 1-1 % cream, Apply topically 3 (three) times a day as needed (for itching)., Disp: 45 g, Rfl: 0  •  montelukast (SINGULAIR) 10 mg tablet, TAKE 1 TABLET BY MOUTH EVERY DAY, Disp: 30 tablet, Rfl: 2  •  PROAIR HFA 90 mcg/actuation inhaler, Inhale 2 puffs 4 (four) times a day as needed for shortness of breath., Disp: 1 Inhaler, Rfl: 2  •  sertraline (ZOLOFT) 50 mg tablet, Take 1 tablet (50 mg total) by mouth once daily., Disp: 30 tablet, Rfl: 6    Allergies: is allergic to feathers, mold, and shellfish derived.     Past Medical History:  has a past medical history of Abnormal Pap smear of cervix, Anxiety, Asthma, Body mass index (BMI) greater than 99th percentile for age in overweight child, Chlamydia, H/O mammogram (2017), Palpitations, Personal history of urinary disorder, Sensation of pressure in bladder area, and Urinary retention with incomplete bladder emptying. She also has no past medical history of Arthritis.  Past  Surgical History:  has a past surgical history that includes Colonoscopy (2016); Hernia repair (12/2016); Myomectomy; Laparoscopic total hysterectomy (N/A, 06/28/2018); and Hysterectomy.  Family History: family history includes Breast cancer in her biological mother; COPD in her maternal grandmother; Colon cancer in her maternal grandfather.  Social History:  reports that she has quit smoking. Her smoking use included cigarettes. She has never used smokeless tobacco. She reports current alcohol use of about 7.0 standard drinks of alcohol per week. She reports that she does not use drugs.  The patient is sexually active. GYN screening history: last pap: was normal. Domestic violence: not asked.     Ms. Tipton is a 56 year old female s/p TLH,BS  (6/2018) for fibroids. Presenting today for annual GYN examination. She was last seen by our office on 4/19/21; At this time patient had completed ultrasound and ova 1 testing. Ova-1 testing showed low risk; ultrasound showed decreased 4.4 cm left ovarian cyst. Patient was instructed to repeat US in 6 months. He repeated ultrasound on 8/25/2021. Results showed; stable size and appearance of a 4.7 cm left ovarian cyst with thin internal septation and no additional suspicious features; this has decreased in size since 7/20/2020. Today she returns for annual GYN exam. She reports overall feeling well; denies any abd/pelvic pain,bloating, distension, change in bowel/bladder habits. She is sexually active with male partner () of 30 + years. She denies any vaginal/vulvar irritation, pruritus, lesions, discharge. She reports using vaginal hyaluronic acid (she cannot remember the name)  which helps improve vaginal dryness. She denies any breast changes and follows with Dr. Herring for breast exams; alternating MRI/Mammogram q 6 months. Patient reports intermittent hot flashes but does not disrupt her day-day.    PAP: no hx of CIN3 or cervical cancer; does not require  paps  Mammo:7/21/21 :neg (managed by Dr. Herring); alternates with MRI breast + mammograms every 6 months due to breast density and family hx breast cancer.   Dexa: will complete at age 66  Colonoscopy: unk      Review of Systems   Constitutional: Negative.    HENT: Negative.    Respiratory: Negative.    Gastrointestinal: Negative.    Genitourinary:        As per HPI   Breast: Negative.     Physical Exam  Constitutional:       Appearance: She is normal weight.   Genitourinary:      Pelvic exam was performed with patient in the lithotomy position.      Urethra, vagina and urethral meatus normal.      Cervix is absent.      Uterus is absent.   Pelvic exam was performed with patient in lithotomy exam position.     External female genitalia normal.   Urethral meatus normal.   Urethra normal.   Normal bladder.   Vagina normal. The cervix is absent.   Uterus is absent.   Adnexa normal. HENT:      Head: Normocephalic.   Abdominal:      General: Abdomen is flat. There is no distension.      Palpations: Abdomen is soft. There is no mass.      Tenderness: There is no abdominal tenderness. There is no guarding or rebound.      Hernia: No hernia is present.   Musculoskeletal:         General: Normal range of motion.      Cervical back: Normal range of motion.   Neurological:      General: No focal deficit present.      Mental Status: She is alert and oriented to person, place, and time.   Psychiatric:         Mood and Affect: Mood normal.         Behavior: Behavior normal.   Vitals reviewed.      Imaging:  Pelvic ultrasound: 8/25/21:  CLINICAL HISTORY: Pelvic pain.  History of hysterectomy.     COMMENT:     Comparison: 3/29/2021     Technique: Complete transabdominal pelvic and transvaginal pelvic examination  were performed.  Transabdominal ultrasound was performed to allow a broad  examination of the pelvis to visualize structures that cannot be seen with  transvaginal imaging alone.  Transvaginal scan was performed for  better  delineation of the pelvic structures, particularly the endometrium and adnexa.     Findings:  Uterus: Status post hysterectomy     The right ovary is not identified.  No discrete adnexal mass is seen.     The left ovary measures 4.4 x 4.1 x 4.1 cm. Again seen within the left ovary is  a 4.7 x 4.1 x 3.7 cm cyst with a thin internal septation, similar in size and  appearance compared to most recent examination..  No evidence of abnormal  internal vascularity or soft tissue density.     No free fluid in the cul-de-sac.     Transabdominal versus Transvaginal: The left ovary is better delineated on  transvaginal images.     --  IMPRESSION:  Stable size and appearance of a 4.7 cm left ovarian cyst with thin internal  septation and no additional suspicious features.  This has decreased in size  since 7/20/2020.    Assessment     Problem List Items Addressed This Visit        Unprioritized    Cyst of left ovary    Overview     Asked patient to follow up with pelvic ultrasound in 4 weeks to further monitor left ovarian cyst.   March 2021 - Overa test - Low Risk  Pelvic US - 4.4cm complex cyst 1 cm smaller           Current Assessment & Plan     Stable with mild decrease in cyst; will further evaluate with another ultrasound in 6 months. Patient remains asymptomatic at this time. Instructed patient to return + complete ultrasound sooner if she develops any symptoms.          Relevant Orders    US PELVIS TRANSABDOMINAL & TRANSVAGINAL    Encounter for gynecological examination without abnormal finding    Current Assessment & Plan     Patient counseled for healthy weight, exercise and diet. Recommended calcium and Vitamin D supplementation. Patient will receive alternating mammogram/breast MRI scripts from Dr. Herring. Patient recently had MRI and will complete Mammogram next; placed script in the system just incase she did not have one already.          Family history of malignant neoplasm of breast    Current Assessment  & Plan     Managed by Dr. Herring with alternating breast MRI/Mammograms. Patient gets breast exams done at their office.           Other Visit Diagnoses     Encounter for screening mammogram for malignant neoplasm of breast    -  Primary    Relevant Orders    BI SCREENING MAMMOGRAM BILATERAL(TOMOSYNTHESIS)             Keke Botello PA-C  Follow-up 6 months to review ultrasound results  Follow-up 12 months for annual GYN examination    I was present in the office and provided direct supervison.    Ciro Monroe MD  Attending

## 2021-09-21 PROBLEM — Z01.419 ENCOUNTER FOR GYNECOLOGICAL EXAMINATION WITHOUT ABNORMAL FINDING: Status: ACTIVE | Noted: 2021-06-18

## 2021-09-21 NOTE — ASSESSMENT & PLAN NOTE
Stable with mild decrease in cyst; will further evaluate with another ultrasound in 6 months. Patient remains asymptomatic at this time. Instructed patient to return + complete ultrasound sooner if she develops any symptoms.

## 2021-09-21 NOTE — ASSESSMENT & PLAN NOTE
Managed by Dr. Herring with alternating breast MRI/Mammograms. Patient gets breast exams done at their office.

## 2021-09-21 NOTE — ASSESSMENT & PLAN NOTE
Patient counseled for healthy weight, exercise and diet. Recommended calcium and Vitamin D supplementation. Patient will receive alternating mammogram/breast MRI scripts from Dr. Herring. Patient recently had MRI and will complete Mammogram next; placed script in the system just incase she did not have one already.

## 2021-09-27 ASSESSMENT — ENCOUNTER SYMPTOMS
PALPITATIONS: 0
BACK PAIN: 0
UNEXPECTED WEIGHT CHANGE: 0
BLOOD IN STOOL: 0
NERVOUS/ANXIOUS: 0
HEMATURIA: 0
ABDOMINAL PAIN: 0
ADENOPATHY: 0
STRIDOR: 0
MYALGIAS: 0
BRUISES/BLEEDS EASILY: 0
AGITATION: 0
TROUBLE SWALLOWING: 0
ARTHRALGIAS: 0
CHEST TIGHTNESS: 0
HEADACHES: 0
VOICE CHANGE: 0
WEAKNESS: 0
FLANK PAIN: 0
FATIGUE: 0
DECREASED CONCENTRATION: 0
FREQUENCY: 0
COLOR CHANGE: 0
JOINT SWELLING: 0
CONFUSION: 0
PHOTOPHOBIA: 0
WHEEZING: 0
SHORTNESS OF BREATH: 0

## 2021-10-06 DIAGNOSIS — J45.909 MILD ASTHMA, UNSPECIFIED WHETHER COMPLICATED, UNSPECIFIED WHETHER PERSISTENT: ICD-10-CM

## 2021-10-06 RX ORDER — MONTELUKAST SODIUM 10 MG/1
TABLET ORAL
Qty: 30 TABLET | Refills: 2 | Status: SHIPPED | OUTPATIENT
Start: 2021-10-06 | End: 2022-01-11

## 2021-10-06 NOTE — TELEPHONE ENCOUNTER
Medicine Refill Request    Last Office Visit: 8/28/2019  Last Telemedicine Visit: 4/22/2020 Brenda Agustin MD    Next Office Visit: Visit date not found  Next Telemedicine Visit: Visit date not found         Current Outpatient Medications:   •  budesonide-formoteroL (SYMBICORT) 80-4.5 mcg/actuation inhaler, Inhale 2 puffs 2 (two) times a day. Rinse mouth with water after use to reduce aftertaste and incidence of candidiasis. Do not swallow., Disp: 10.2 Inhaler, Rfl: 2  •  cholecalciferol, vitamin D3, 1,000 unit (25 mcg) tablet, Take 1,000 Units by mouth daily., Disp: , Rfl:   •  hydrocortisone-iodoquinoL (VYTONE) 1-1 % cream, Apply topically 3 (three) times a day as needed (for itching)., Disp: 45 g, Rfl: 0  •  montelukast (SINGULAIR) 10 mg tablet, TAKE 1 TABLET BY MOUTH EVERY DAY, Disp: 30 tablet, Rfl: 2  •  PROAIR HFA 90 mcg/actuation inhaler, Inhale 2 puffs 4 (four) times a day as needed for shortness of breath., Disp: 1 Inhaler, Rfl: 2  •  sertraline (ZOLOFT) 50 mg tablet, Take 1 tablet (50 mg total) by mouth once daily., Disp: 30 tablet, Rfl: 6      BP Readings from Last 3 Encounters:   09/20/21 (!) 152/80   06/18/21 132/80   10/06/20 124/80       Recent Lab results:  Lab Results   Component Value Date    CHOL 302 (H) 07/08/2021   ,   Lab Results   Component Value Date     07/08/2021   ,   Lab Results   Component Value Date    LDLCALC 190 (H) 07/08/2021   ,   Lab Results   Component Value Date    TRIG 50 07/08/2021        Lab Results   Component Value Date    GLUCOSE 104 (H) 07/08/2021   , No results found for: HGBA1C      Lab Results   Component Value Date    CREATININE 0.69 07/08/2021       Lab Results   Component Value Date    TSH 0.975 07/08/2021

## 2021-10-23 LAB
ALBUMIN SERPL-MCNC: 4.8 G/DL (ref 3.8–4.9)
ALBUMIN/GLOB SERPL: 2 {RATIO} (ref 1.2–2.2)
ALP SERPL-CCNC: 48 IU/L (ref 44–121)
ALT SERPL-CCNC: 20 IU/L (ref 0–32)
AST SERPL-CCNC: 23 IU/L (ref 0–40)
BILIRUB SERPL-MCNC: 0.5 MG/DL (ref 0–1.2)
BUN SERPL-MCNC: 15 MG/DL (ref 6–24)
BUN/CREAT SERPL: 23 (ref 9–23)
CALCIUM SERPL-MCNC: 9.6 MG/DL (ref 8.7–10.2)
CHLORIDE SERPL-SCNC: 100 MMOL/L (ref 96–106)
CHOLEST SERPL-MCNC: 289 MG/DL (ref 100–199)
CO2 SERPL-SCNC: 25 MMOL/L (ref 20–29)
CREAT SERPL-MCNC: 0.64 MG/DL (ref 0.57–1)
GLOBULIN SER CALC-MCNC: 2.4 G/DL (ref 1.5–4.5)
GLUCOSE SERPL-MCNC: 111 MG/DL (ref 65–99)
HBA1C MFR BLD: 5.3 % (ref 4.8–5.6)
HDLC SERPL-MCNC: 88 MG/DL
LAB CORP EGFR IF AFRICN AM: 116 ML/MIN/1.73
LAB CORP EGFR IF NONAFRICN AM: 101 ML/MIN/1.73
LDLC SERPL CALC-MCNC: 185 MG/DL (ref 0–99)
POTASSIUM SERPL-SCNC: 4.2 MMOL/L (ref 3.5–5.2)
PROT SERPL-MCNC: 7.2 G/DL (ref 6–8.5)
SODIUM SERPL-SCNC: 140 MMOL/L (ref 134–144)
TRIGL SERPL-MCNC: 96 MG/DL (ref 0–149)
VLDLC SERPL CALC-MCNC: 16 MG/DL (ref 5–40)

## 2021-11-05 ENCOUNTER — TELEMEDICINE (OUTPATIENT)
Dept: PRIMARY CARE | Facility: CLINIC | Age: 56
End: 2021-11-05
Payer: COMMERCIAL

## 2021-11-05 DIAGNOSIS — Z87.891 FORMER SMOKER: ICD-10-CM

## 2021-11-05 DIAGNOSIS — E78.00 HYPERCHOLESTEROLEMIA: Primary | ICD-10-CM

## 2021-11-05 DIAGNOSIS — R73.9 HYPERGLYCEMIA: ICD-10-CM

## 2021-11-05 DIAGNOSIS — R03.0 ELEVATED BP WITHOUT DIAGNOSIS OF HYPERTENSION: ICD-10-CM

## 2021-11-05 DIAGNOSIS — Z12.2 SCREENING FOR LUNG CANCER: ICD-10-CM

## 2021-11-05 PROCEDURE — 99214 OFFICE O/P EST MOD 30 MIN: CPT | Mod: 95 | Performed by: INTERNAL MEDICINE

## 2021-11-05 NOTE — ASSESSMENT & PLAN NOTE
Noted high at Gyn office.  Not been high before.  Plan  monitor Bp at home.   Office appt if BP > 140/90.  Bring cuff in too.

## 2021-11-05 NOTE — PROGRESS NOTES
Verification of Patient Location:  The patient affirms they are currently located in the following state: [place]    Request for Consent:    Audio and Video Encounter   Mei, my name is Erma Guillen MD.  Before we proceed, can you please verify your identification by telling me your full name and date of birth?  Can you tell me who is in the room with you?    You and I are about to have a telemedicine check-in or visit because you have requested it.  This is a live video-conference.  I am a real person, speaking to you in real time.  There is no one else with me on the video-conference.  However, when we use ([employer], [employer], etc) it is important for you to know that the video-conference may not be secure or private.  I am not recording this conversation and I am asking you not to record it.  This telemedicine visit will be billed to your health insurance or you, if you are self-insured.  You understand you will be responsible for any copayments or coinsurances that apply to your telemedicine visit.  Communication platform used for this encounter:  [employer] Video Visit (with Zoom integration)     Before starting our telemedicine visit, I am required to get your consent for this virtual check-in or visit by telemedicine. Do you consent?      Patient Response to Request for Consent:  Yes      Visit Documentation:  Subjective     Patient ID: Madonna Tipton is a 56 y.o. female.  1965      [place]--    I have personally reviewed the laboratory reports: CMP, cholesterol panel, A1c.   Discussed with patient.       Former smoker--  Smoked 2 PPD started at 16 - quit at 43.  Discussed lung cancer screening criteria.  Pt meets it.          The following have been reviewed and updated as appropriate in this visit:  Problems       Review of Systems   All other systems reviewed and are negative.    Video Exam:  Atraumatic, normocephalic.  Eyes no discharge, redness; ears, nose visually normal.   Breathing  comfortably, talking in complete sentences.   No obvious distress. No wheezing.   Mental status normal, visible cranial nerves normal.  No accessory muscle use.       Assessment/Plan   Diagnoses and all orders for this visit:    Hypercholesterolemia (Primary)  Assessment & Plan:  Still with high LDL despite very strict diet.  ASCVD 2.6%.  Some Fh - unknown in first degree relatives.  Plan CT calcium score.        Orders:  -     CT HEART CORONARY ARTERY CALCIUM SCORE WITHOUT IV CONTRAST; Future    Former smoker  Assessment & Plan:  Former smoker--  Smoked 2 PPD started at 16 - quit at 43.    Orders:  -     CT LUNG SCREENING WITHOUT IV CONTRAST; Future    Elevated BP without diagnosis of hypertension  Assessment & Plan:  Noted high at Gyn office.  Not been high before.  Plan  monitor Bp at home.   Office appt if BP > 140/90.  Bring cuff in too.       Hyperglycemia  Assessment & Plan:  Reviewed labs.  Bg high.  A1c 5.3.  Cont low carb diet.      Screening for lung cancer  Assessment & Plan:  Discussed lung cancer screening.   Will order.     Orders:  -     CT LUNG SCREENING WITHOUT IV CONTRAST; Future      Time Spent:  I spent 30 minutes on this date of service performing the following activities: obtaining history, entering orders, documenting, providing counseling and education and independently reviewing study/studies.

## 2021-11-05 NOTE — ASSESSMENT & PLAN NOTE
Still with high LDL despite very strict diet.  ASCVD 2.6%.  Some Fh - unknown in first degree relatives.  Plan CT calcium score.

## 2021-12-13 ENCOUNTER — OFFICE VISIT (OUTPATIENT)
Dept: SURGERY | Facility: CLINIC | Age: 56
End: 2021-12-13
Payer: COMMERCIAL

## 2021-12-13 ENCOUNTER — PATIENT OUTREACH (OUTPATIENT)
Dept: RADIOLOGY | Age: 56
End: 2021-12-13
Payer: COMMERCIAL

## 2021-12-13 ENCOUNTER — HOSPITAL ENCOUNTER (OUTPATIENT)
Dept: RADIOLOGY | Age: 56
Discharge: HOME | End: 2021-12-13
Attending: SURGERY
Payer: COMMERCIAL

## 2021-12-13 VITALS
HEIGHT: 65 IN | DIASTOLIC BLOOD PRESSURE: 72 MMHG | SYSTOLIC BLOOD PRESSURE: 150 MMHG | WEIGHT: 153.5 LBS | OXYGEN SATURATION: 98 % | TEMPERATURE: 97.8 F | HEART RATE: 108 BPM | BODY MASS INDEX: 25.58 KG/M2

## 2021-12-13 DIAGNOSIS — N63.22 LUMP IN UPPER INNER QUADRANT OF LEFT BREAST: ICD-10-CM

## 2021-12-13 DIAGNOSIS — Z91.89 AT HIGH RISK FOR BREAST CANCER: Primary | ICD-10-CM

## 2021-12-13 DIAGNOSIS — Z91.89 AT HIGH RISK FOR BREAST CANCER: ICD-10-CM

## 2021-12-13 PROCEDURE — 99212 OFFICE O/P EST SF 10 MIN: CPT | Performed by: SURGERY

## 2021-12-13 PROCEDURE — 76642 ULTRASOUND BREAST LIMITED: CPT | Mod: LT

## 2021-12-13 PROCEDURE — 3008F BODY MASS INDEX DOCD: CPT | Performed by: SURGERY

## 2021-12-13 ASSESSMENT — ENCOUNTER SYMPTOMS
PSYCHIATRIC NEGATIVE: 1
HEMATOLOGIC/LYMPHATIC NEGATIVE: 1
NEUROLOGICAL NEGATIVE: 1
ALLERGIC/IMMUNOLOGIC NEGATIVE: 1
GASTROINTESTINAL NEGATIVE: 1
ENDOCRINE NEGATIVE: 1
CARDIOVASCULAR NEGATIVE: 1
EYES NEGATIVE: 1
RESPIRATORY NEGATIVE: 1
MUSCULOSKELETAL NEGATIVE: 1
CONSTITUTIONAL NEGATIVE: 1

## 2021-12-13 NOTE — PROGRESS NOTES
[facility]  Dr. Benja Herring  [address] [place] [address]  Phone: [phone]  Fax: [phone]      Patient ID: Madonna Tipton                              : 1965    Visit Date: 2021  Referring Provider: No ref. provider found   PCP: Erma Guillen MD  GYN: Patient Care Team:  Kevin Vasquez MD as Consulting Physician (Gynecologic Oncology)    Chief complaint: Left breast mass    Subjective: The patient is a 56-year-old female who is being followed for high risk for breast cancer.  She was last seen in 2021 for follow-up.  She states she noticed a new left breast mass behind the left nipple about 2.5 weeks ago.  She states the pain emanates from that site.  Over the past 2 weeks, then mass has gotten smaller and less tender.  She denies any nipple discharge, nipple retraction or skin changes.  She is scheduled for an MRI in early .        Review of Systems   Constitutional: Negative.    HENT: Negative.    Eyes: Negative.    Respiratory: Negative.    Cardiovascular: Negative.    Gastrointestinal: Negative.    Endocrine: Negative.    Genitourinary: Negative.    Musculoskeletal: Negative.    Skin: Negative.    Allergic/Immunologic: Negative.    Neurological: Negative.    Hematological: Negative.    Psychiatric/Behavioral: Negative.         Allergies: Feathers, Mold, and Shellfish derived    Current Medications: has a current medication list which includes the following prescription(s): budesonide-formoterol, cholecalciferol (vitamin d3), hydrocortisone-iodoquinol, montelukast, proair hfa, and sertraline.    Past Medical History:  has a past medical history of Abnormal Pap smear of cervix, Anxiety, Asthma, Body mass index (BMI) greater than 99th percentile for age in overweight child, Chlamydia, H/O mammogram (2017), Palpitations, Personal history of urinary disorder, Sensation of pressure in bladder area, and Urinary retention with  "incomplete bladder emptying.She has no past medical history of Arthritis.    Past Surgical History:  has a past surgical history that includes Colonoscopy (2016); Hernia repair (12/2016); Myomectomy; Laparoscopic total hysterectomy (N/A, 06/28/2018); and Hysterectomy.    Social History:  reports that she has quit smoking. Her smoking use included cigarettes. She has never used smokeless tobacco. She reports current alcohol use of about 7.0 standard drinks of alcohol per week. She reports that she does not use drugs.    Family History: family history includes Breast cancer in her biological mother; COPD in her maternal grandmother; Colon cancer in her maternal grandfather.        Physical Exam:    Vitals:   Visit Vitals  BP (!) 150/72   Pulse (!) 108   Temp 36.6 °C (97.8 °F)   Ht 1.651 m (5' 5\")   Wt 69.6 kg (153 lb 8 oz)   LMP 05/31/2018   SpO2 98%   BMI 25.54 kg/m²     Body mass index is 25.54 kg/m².    Physical Exam  Physical Examination performed in the supine and sitting position  BREAST SIZE: small   SYMMETRY yes       SKIN - Skin color, texture, turgor normal. No rashes or lesions. Skin is without tethering, dimpling, retraction or increased vascularity  AREOLA - normal    NIPPLES -  normal without retraction and without discharge  LYMPH NODES: infra, supra, cervical, parasternal and axillary nodes normal bilaterally  BREAST TISSUE: Right breast normal without discrete lesions. Left Breast with a small, well-circumscribed and mobile mass at the retroareolar 10 o'clock position.  A bedside ultrasound was performed, targeting the left breast palpable lesion.  A small hypoechoic oval lesion was noted, 0.68 cm wide, with no posterior shadowing.    Breast Imaging:   No new imaging since last follow-up.  Screening mammogram (7/21/2021): Breast density type C.  No suspicious masses seen, no suspicious architectural distortion or calcifications.    Impression:  56-year-old female with a new palpable left breast mass " at the retroareolar 10 o'clock position measuring approximately 0.7 cm, likely a simple cyst.    Recommendation and Plan:   We recommend a formal left breast ultrasound for better characterization of this lesion.  Follow-up after imaging.    All of the questions were answered.  The patient is in agreement with the treatment plan.      No follow-ups on file.        12/13/2021   12:16 PM        Veda Quevedo DO

## 2021-12-14 RX ORDER — ALBUTEROL SULFATE 90 UG/1
2 AEROSOL, METERED RESPIRATORY (INHALATION) 4 TIMES DAILY PRN
Qty: 18 G | Refills: 3 | Status: SHIPPED | OUTPATIENT
Start: 2021-12-14 | End: 2022-12-19 | Stop reason: SDUPTHER

## 2021-12-14 RX ORDER — BUDESONIDE AND FORMOTEROL FUMARATE DIHYDRATE 80; 4.5 UG/1; UG/1
2 AEROSOL RESPIRATORY (INHALATION)
Qty: 10.2 G | Refills: 1 | Status: SHIPPED | OUTPATIENT
Start: 2021-12-14 | End: 2022-02-07

## 2021-12-14 NOTE — TELEPHONE ENCOUNTER
Medicine Refill Request    Last Office: 6/18/2021   Last Telemedicine Visit: 11/5/2021 Erma Guillen MD  Last Consult Visit: Visit date not found    Next Office Visit: Visit date not found  Next Telemedicine Visit: Visit date not found         Current Outpatient Medications:   •  budesonide-formoteroL (SYMBICORT) 80-4.5 mcg/actuation inhaler, Inhale 2 puffs 2 (two) times a day. Rinse mouth with water after use to reduce aftertaste and incidence of candidiasis. Do not swallow., Disp: 10.2 Inhaler, Rfl: 2  •  cholecalciferol, vitamin D3, 1,000 unit (25 mcg) tablet, Take 1,000 Units by mouth daily., Disp: , Rfl:   •  hydrocortisone-iodoquinoL (VYTONE) 1-1 % cream, Apply topically 3 (three) times a day as needed (for itching)., Disp: 45 g, Rfl: 0  •  montelukast (SINGULAIR) 10 mg tablet, TAKE 1 TABLET BY MOUTH EVERY DAY, Disp: 30 tablet, Rfl: 2  •  PROAIR HFA 90 mcg/actuation inhaler, Inhale 2 puffs 4 (four) times a day as needed for shortness of breath., Disp: 1 Inhaler, Rfl: 2  •  sertraline (ZOLOFT) 50 mg tablet, Take 1 tablet (50 mg total) by mouth once daily., Disp: 30 tablet, Rfl: 6      BP Readings from Last 3 Encounters:   12/13/21 (!) 150/72   09/20/21 (!) 152/80   06/18/21 132/80       Recent Lab results:  Lab Results   Component Value Date    CHOL 289 (H) 10/22/2021   ,   Lab Results   Component Value Date    HDL 88 10/22/2021   ,   Lab Results   Component Value Date    LDLCALC 185 (H) 10/22/2021   ,   Lab Results   Component Value Date    TRIG 96 10/22/2021        Lab Results   Component Value Date    GLUCOSE 111 (H) 10/22/2021   ,   Lab Results   Component Value Date    HGBA1C 5.3 10/22/2021         Lab Results   Component Value Date    CREATININE 0.64 10/22/2021       Lab Results   Component Value Date    TSH 0.975 07/08/2021

## 2021-12-15 ENCOUNTER — TRANSCRIBE ORDERS (OUTPATIENT)
Dept: SURGERY | Facility: CLINIC | Age: 56
End: 2021-12-15
Payer: COMMERCIAL

## 2021-12-15 DIAGNOSIS — R92.8 OTHER ABNORMAL AND INCONCLUSIVE FINDINGS ON DIAGNOSTIC IMAGING OF BREAST: Primary | ICD-10-CM

## 2021-12-20 ENCOUNTER — HOSPITAL ENCOUNTER (OUTPATIENT)
Dept: RADIOLOGY | Age: 56
Discharge: HOME | End: 2021-12-20
Attending: SURGERY
Payer: COMMERCIAL

## 2021-12-20 VITALS — SYSTOLIC BLOOD PRESSURE: 180 MMHG | DIASTOLIC BLOOD PRESSURE: 71 MMHG

## 2021-12-20 DIAGNOSIS — Z91.89 AT HIGH RISK FOR BREAST CANCER: ICD-10-CM

## 2021-12-20 DIAGNOSIS — R92.8 OTHER ABNORMAL AND INCONCLUSIVE FINDINGS ON DIAGNOSTIC IMAGING OF BREAST: ICD-10-CM

## 2021-12-20 DIAGNOSIS — N63.22 LUMP IN UPPER INNER QUADRANT OF LEFT BREAST: ICD-10-CM

## 2021-12-20 PROCEDURE — 0HBU3ZX EXCISION OF LEFT BREAST, PERCUTANEOUS APPROACH, DIAGNOSTIC: ICD-10-PCS | Performed by: RADIOLOGY

## 2021-12-20 PROCEDURE — BH41ZZZ ULTRASONOGRAPHY OF LEFT BREAST: ICD-10-PCS | Performed by: RADIOLOGY

## 2021-12-20 PROCEDURE — 88342 IMHCHEM/IMCYTCHM 1ST ANTB: CPT | Performed by: SURGERY

## 2021-12-20 PROCEDURE — 77065 DX MAMMO INCL CAD UNI: CPT | Mod: LT

## 2021-12-20 PROCEDURE — 25000000 HC PHARMACY GENERAL: Performed by: RADIOLOGY

## 2021-12-20 PROCEDURE — 76942 ECHO GUIDE FOR BIOPSY: CPT | Mod: LT

## 2021-12-20 RX ORDER — LIDOCAINE HYDROCHLORIDE 10 MG/ML
10 INJECTION, SOLUTION INFILTRATION; PERINEURAL ONCE
Status: COMPLETED | OUTPATIENT
Start: 2021-12-20 | End: 2021-12-20

## 2021-12-20 RX ADMIN — LIDOCAINE HYDROCHLORIDE 10 ML: 10 INJECTION, SOLUTION INFILTRATION; PERINEURAL at 16:50

## 2021-12-20 NOTE — POST-PROCEDURE NOTE
Immediate Breast Interventional Postprocedure Note    Madonna Tipton     Attending: Laura Fontaine MD    Assistant: Technologist      Diagnosis: Breast Abnormality    Description of procedure: Imaging Guided Percutaneous Breast Biopsy     Laterality: Left    Anesthesia:  Local (Lidocaine)    Findings: Breast Abnormality    Complications: None    Estimated Blood Loss: Less than 100 ml    Specimens: Breast Tissue      12/20/2021 4:50 PM

## 2021-12-22 PROBLEM — R91.8 PULMONARY NODULES: Status: ACTIVE | Noted: 2021-12-22

## 2021-12-22 LAB
CASE RPRT: NORMAL
CLINICAL INFO: NORMAL
IMMUNE STAIN STUDY: NORMAL
PATH REPORT.FINAL DX SPEC: NORMAL
PATH REPORT.FINAL DX SPEC: NORMAL
PATH REPORT.GROSS SPEC: NORMAL

## 2021-12-23 ENCOUNTER — TELEPHONE (OUTPATIENT)
Dept: SURGERY | Facility: CLINIC | Age: 56
End: 2021-12-23
Payer: COMMERCIAL

## 2021-12-23 NOTE — TELEPHONE ENCOUNTER
Called patient with result.  Fibroadenoma.  Recommend 6-month follow-up with ultrasound.  Patient will delay MRI for 2 to 3 months to allow swelling to resorb prior to MRI for high rescreening

## 2022-01-11 DIAGNOSIS — F41.9 ANXIETY: ICD-10-CM

## 2022-01-11 DIAGNOSIS — J45.909 MILD ASTHMA, UNSPECIFIED WHETHER COMPLICATED, UNSPECIFIED WHETHER PERSISTENT: ICD-10-CM

## 2022-01-11 RX ORDER — MONTELUKAST SODIUM 10 MG/1
10 TABLET ORAL
Qty: 90 TABLET | Refills: 0 | Status: SHIPPED | OUTPATIENT
Start: 2022-01-11 | End: 2022-04-08

## 2022-01-11 RX ORDER — SERTRALINE HYDROCHLORIDE 50 MG/1
50 TABLET, FILM COATED ORAL
Qty: 90 TABLET | Refills: 0 | Status: SHIPPED | OUTPATIENT
Start: 2022-01-11 | End: 2022-04-08

## 2022-01-11 NOTE — TELEPHONE ENCOUNTER
Medicine Refill Request    Last Office: 6/18/2021   Last Consult Visit: Visit date not found  Last Telemedicine Visit: 11/5/2021 Erma Guillen MD    Next Appointment: Visit date not found      Current Outpatient Medications:   •  budesonide-formoteroL (SYMBICORT) 80-4.5 mcg/actuation inhaler, Inhale 2 puffs 2 (two) times a day. Rinse mouth with water after use to reduce aftertaste and incidence of candidiasis. Do not swallow., Disp: 10.2 g, Rfl: 1  •  cholecalciferol, vitamin D3, 1,000 unit (25 mcg) tablet, Take 1,000 Units by mouth daily., Disp: , Rfl:   •  hydrocortisone-iodoquinoL (VYTONE) 1-1 % cream, Apply topically 3 (three) times a day as needed (for itching)., Disp: 45 g, Rfl: 0  •  montelukast (SINGULAIR) 10 mg tablet, TAKE 1 TABLET BY MOUTH EVERY DAY, Disp: 30 tablet, Rfl: 2  •  PROAIR HFA 90 mcg/actuation inhaler, Inhale 2 puffs 4 (four) times a day as needed for shortness of breath., Disp: 18 g, Rfl: 3  •  sertraline (ZOLOFT) 50 mg tablet, Take 1 tablet (50 mg total) by mouth once daily., Disp: 30 tablet, Rfl: 6      BP Readings from Last 3 Encounters:   12/20/21 (!) 180/71   12/13/21 (!) 150/72   09/20/21 (!) 152/80       Recent Lab results:  Lab Results   Component Value Date    CHOL 289 (H) 10/22/2021   ,   Lab Results   Component Value Date    HDL 88 10/22/2021   ,   Lab Results   Component Value Date    LDLCALC 185 (H) 10/22/2021   ,   Lab Results   Component Value Date    TRIG 96 10/22/2021        Lab Results   Component Value Date    GLUCOSE 111 (H) 10/22/2021   ,   Lab Results   Component Value Date    HGBA1C 5.3 10/22/2021         Lab Results   Component Value Date    CREATININE 0.64 10/22/2021       Lab Results   Component Value Date    TSH 0.975 07/08/2021

## 2022-01-11 NOTE — TELEPHONE ENCOUNTER
Please contact patient to schedule an appointment and let Clinical Staff know if they will need more medication prior to visit.  Thanks!

## 2022-02-07 RX ORDER — DILTIAZEM HYDROCHLORIDE 60 MG/1
TABLET, FILM COATED ORAL
Qty: 10.2 G | Refills: 1 | Status: SHIPPED | OUTPATIENT
Start: 2022-02-07 | End: 2022-12-19 | Stop reason: SDUPTHER

## 2022-02-07 NOTE — TELEPHONE ENCOUNTER
Medicine Refill Request    Last Office: 6/18/2021   Last Consult Visit: Visit date not found  Last Telemedicine Visit: 11/5/2021 Erma Guillen MD    Next Appointment: Visit date not found      Current Outpatient Medications:   •  budesonide-formoteroL (SYMBICORT) 80-4.5 mcg/actuation inhaler, Inhale 2 puffs 2 (two) times a day. Rinse mouth with water after use to reduce aftertaste and incidence of candidiasis. Do not swallow., Disp: 10.2 g, Rfl: 1  •  cholecalciferol, vitamin D3, 1,000 unit (25 mcg) tablet, Take 1,000 Units by mouth daily., Disp: , Rfl:   •  hydrocortisone-iodoquinoL (VYTONE) 1-1 % cream, Apply topically 3 (three) times a day as needed (for itching)., Disp: 45 g, Rfl: 0  •  montelukast 10 mg tablet, Take 1 tablet (10 mg total) by mouth once daily., Disp: 90 tablet, Rfl: 0  •  PROAIR HFA 90 mcg/actuation inhaler, Inhale 2 puffs 4 (four) times a day as needed for shortness of breath., Disp: 18 g, Rfl: 3  •  sertraline 50 mg tablet, Take 1 tablet (50 mg total) by mouth once daily., Disp: 90 tablet, Rfl: 0      BP Readings from Last 3 Encounters:   12/20/21 (!) 180/71   12/13/21 (!) 150/72   09/20/21 (!) 152/80       Recent Lab results:  Lab Results   Component Value Date    CHOL 289 (H) 10/22/2021   ,   Lab Results   Component Value Date    HDL 88 10/22/2021   ,   Lab Results   Component Value Date    LDLCALC 185 (H) 10/22/2021   ,   Lab Results   Component Value Date    TRIG 96 10/22/2021        Lab Results   Component Value Date    GLUCOSE 111 (H) 10/22/2021   ,   Lab Results   Component Value Date    HGBA1C 5.3 10/22/2021         Lab Results   Component Value Date    CREATININE 0.64 10/22/2021       Lab Results   Component Value Date    TSH 0.975 07/08/2021

## 2022-03-09 ENCOUNTER — TELEPHONE (OUTPATIENT)
Dept: SURGERY | Facility: CLINIC | Age: 57
End: 2022-03-09
Payer: COMMERCIAL

## 2022-03-09 NOTE — TELEPHONE ENCOUNTER
SPOKE TO PT AND GAVE HER THE BREAST MRI AUTH# [id number] (VALID 3/9-5/7/22) PT WILL SCHEDULE APPT AND I TOLD HER TO CALL ME BACK IF I CAN HELP IN ANY WAY.  ZAFAR

## 2022-03-21 ENCOUNTER — HOSPITAL ENCOUNTER (OUTPATIENT)
Dept: RADIOLOGY | Facility: HOSPITAL | Age: 57
Discharge: HOME | End: 2022-03-21
Attending: SURGERY
Payer: COMMERCIAL

## 2022-03-21 DIAGNOSIS — Z91.89 AT HIGH RISK FOR BREAST CANCER: ICD-10-CM

## 2022-03-21 RX ORDER — GADOBUTROL 604.72 MG/ML
7 INJECTION INTRAVENOUS ONCE
Status: COMPLETED | OUTPATIENT
Start: 2022-03-21 | End: 2022-03-21

## 2022-03-21 RX ADMIN — GADOBUTROL 7 ML: 604.72 INJECTION INTRAVENOUS at 09:00

## 2022-03-24 ENCOUNTER — TELEPHONE (OUTPATIENT)
Dept: SURGERY | Facility: CLINIC | Age: 57
End: 2022-03-24
Payer: COMMERCIAL

## 2022-03-24 DIAGNOSIS — Z91.89 AT HIGH RISK FOR BREAST CANCER: Primary | ICD-10-CM

## 2022-03-24 NOTE — TELEPHONE ENCOUNTER
Call patient with MRI result.  BI-RADS 1, no suspicious findings.  Patient due for bilateral mammogram in 6 months

## 2022-04-07 ENCOUNTER — TELEMEDICINE (OUTPATIENT)
Dept: SURGERY | Facility: CLINIC | Age: 57
End: 2022-04-07
Payer: COMMERCIAL

## 2022-04-07 VITALS — BODY MASS INDEX: 25.83 KG/M2 | HEIGHT: 65 IN | WEIGHT: 155 LBS

## 2022-04-07 DIAGNOSIS — Z80.3 FAMILY HISTORY OF MALIGNANT NEOPLASM OF BREAST: Primary | ICD-10-CM

## 2022-04-07 DIAGNOSIS — N83.202 CYST OF LEFT OVARY: Primary | ICD-10-CM

## 2022-04-07 DIAGNOSIS — F41.9 ANXIETY: ICD-10-CM

## 2022-04-07 DIAGNOSIS — J45.909 MILD ASTHMA, UNSPECIFIED WHETHER COMPLICATED, UNSPECIFIED WHETHER PERSISTENT: ICD-10-CM

## 2022-04-07 PROCEDURE — 3008F BODY MASS INDEX DOCD: CPT | Performed by: NURSE PRACTITIONER

## 2022-04-07 PROCEDURE — 99212 OFFICE O/P EST SF 10 MIN: CPT | Mod: 95 | Performed by: NURSE PRACTITIONER

## 2022-04-07 ASSESSMENT — ENCOUNTER SYMPTOMS
HEMATURIA: 0
JOINT SWELLING: 0
WOUND: 0
COUGH: 0
COLOR CHANGE: 0
FEVER: 0
ARTHRALGIAS: 0
AGITATION: 0
NUMBNESS: 0
STRIDOR: 0
DIZZINESS: 0
ABDOMINAL PAIN: 0
CONFUSION: 0
CHILLS: 0
FREQUENCY: 0
VOMITING: 0
PALPITATIONS: 0
SHORTNESS OF BREATH: 0
ALLERGIC/IMMUNOLOGIC NEGATIVE: 1
DIARRHEA: 0
HEADACHES: 0
UNEXPECTED WEIGHT CHANGE: 0
CHEST TIGHTNESS: 0
DYSURIA: 0
ADENOPATHY: 0
NAUSEA: 0

## 2022-04-07 NOTE — TELEPHONE ENCOUNTER
Medicine Refill Request    Last Office: 6/18/2021   Last Consult Visit: Visit date not found  Last Telemedicine Visit: 11/5/2021 Erma Guillen MD    Next Appointment: 5/13/2022      Current Outpatient Medications:   •  cholecalciferol, vitamin D3, 1,000 unit (25 mcg) tablet, Take 1,000 Units by mouth daily., Disp: , Rfl:   •  hydrocortisone-iodoquinoL (VYTONE) 1-1 % cream, Apply topically 3 (three) times a day as needed (for itching). (Patient not taking: Reported on 4/7/2022), Disp: 45 g, Rfl: 0  •  montelukast 10 mg tablet, Take 1 tablet (10 mg total) by mouth once daily., Disp: 90 tablet, Rfl: 0  •  PROAIR HFA 90 mcg/actuation inhaler, Inhale 2 puffs 4 (four) times a day as needed for shortness of breath., Disp: 18 g, Rfl: 3  •  sertraline 50 mg tablet, Take 1 tablet (50 mg total) by mouth once daily., Disp: 90 tablet, Rfl: 0  •  SYMBICORT 80-4.5 mcg/actuation inhaler, INHALE 2 PUFFS INTO THE LUNGS TWICE A DAY RINSE MOUTH WITH WATER AFTER USE, DO NOT SWALLOW., Disp: 10.2 g, Rfl: 1      BP Readings from Last 3 Encounters:   12/20/21 (!) 180/71   12/13/21 (!) 150/72   09/20/21 (!) 152/80       Recent Lab results:  Lab Results   Component Value Date    CHOL 289 (H) 10/22/2021   ,   Lab Results   Component Value Date    HDL 88 10/22/2021   ,   Lab Results   Component Value Date    LDLCALC 185 (H) 10/22/2021   ,   Lab Results   Component Value Date    TRIG 96 10/22/2021        Lab Results   Component Value Date    GLUCOSE 111 (H) 10/22/2021   ,   Lab Results   Component Value Date    HGBA1C 5.3 10/22/2021         Lab Results   Component Value Date    CREATININE 0.64 10/22/2021       Lab Results   Component Value Date    TSH 0.975 07/08/2021

## 2022-04-07 NOTE — PROGRESS NOTES
[facility]  [facility]  [address] [place] [address]  Phone: [phone]  Fax: [phone]      Patient ID: Madonna Tipton                              : 1965    Visit Date: 2022  Referring Provider: No ref. provider found   PCP: Erma Guillen MD  GYN: Patient Care Team:  Kevin Vasquez MD as Consulting Physician (Gynecologic Oncology)    My chart video visit-  The patient is aware and consents to video visit.      Subjective: Madonna Tipton is an established high risk patient and presents for follow up.  At this time the patient has no complaints related to her breast.  She denies nipple discharge, nipple retraction, skin changes, or masses palpated.        Review of Systems   Constitutional: Negative for chills, fever and unexpected weight change.   HENT: Negative for congestion.    Respiratory: Negative for cough, chest tightness, shortness of breath and stridor.    Cardiovascular: Negative for chest pain and palpitations.   Gastrointestinal: Negative for abdominal pain, diarrhea, nausea and vomiting.   Endocrine: Negative for cold intolerance and heat intolerance.   Genitourinary: Negative for dysuria, frequency, hematuria and urgency.   Musculoskeletal: Negative for arthralgias and joint swelling.   Skin: Negative for color change, pallor, rash and wound.   Allergic/Immunologic: Negative.    Neurological: Negative for dizziness, numbness and headaches.   Hematological: Negative for adenopathy.   Psychiatric/Behavioral: Negative for agitation, confusion and suicidal ideas.          Physical Exam:  Exam deferred due to video visit    Breast Imaging: I have personally reviewed the reports and images as follows.  MRI  3/21/22  Mild background enhancement, no suspicious masses or non mass-like enhancement    Impression:  Increased risk of breast cancer    Recommendation and Plan:   This is a 56-year-old female who presents today via [employer] video  visit and high risk follow-up.  She is doing well without complaints.  We reviewed her MRI as well as indications that would warrant sooner diagnostic evaluation.  We discussed her upcoming mammogram to be completed in August.  Lastly, she was reminded to continue with all of her routine and preventative healthcare and will return to our office in 1 year.    All of the questions were answered.  The patient is in agreement with the treatment plan.      No follow-ups on file.        4/7/2022   9:22 AM    EMMA Harris CRNP

## 2022-04-08 RX ORDER — MONTELUKAST SODIUM 10 MG/1
TABLET ORAL
Qty: 30 TABLET | Refills: 2 | Status: SHIPPED | OUTPATIENT
Start: 2022-04-08 | End: 2022-08-30

## 2022-04-08 RX ORDER — SERTRALINE HYDROCHLORIDE 50 MG/1
TABLET, FILM COATED ORAL
Qty: 30 TABLET | Refills: 2 | Status: SHIPPED | OUTPATIENT
Start: 2022-04-08 | End: 2022-08-05

## 2022-04-20 ENCOUNTER — HOSPITAL ENCOUNTER (OUTPATIENT)
Dept: RADIOLOGY | Facility: HOSPITAL | Age: 57
Discharge: HOME | End: 2022-04-20
Attending: PHYSICIAN ASSISTANT
Payer: COMMERCIAL

## 2022-04-20 DIAGNOSIS — N83.202 CYST OF LEFT OVARY: ICD-10-CM

## 2022-04-20 PROCEDURE — 76830 TRANSVAGINAL US NON-OB: CPT

## 2022-05-11 ENCOUNTER — TELEPHONE (OUTPATIENT)
Dept: GYNECOLOGIC ONCOLOGY | Facility: CLINIC | Age: 57
End: 2022-05-11
Payer: COMMERCIAL

## 2022-05-11 NOTE — TELEPHONE ENCOUNTER
Called with US results:     IMPRESSION: 3.9 x 3.5 x 3.6 cm left ovarian cyst which measures slightly smaller than on prior studies.  Status post hysterectomy.

## 2022-05-13 ENCOUNTER — OFFICE VISIT (OUTPATIENT)
Dept: PRIMARY CARE | Facility: CLINIC | Age: 57
End: 2022-05-13
Payer: COMMERCIAL

## 2022-05-13 VITALS
HEIGHT: 65 IN | WEIGHT: 152 LBS | BODY MASS INDEX: 25.33 KG/M2 | TEMPERATURE: 97.5 F | HEART RATE: 91 BPM | OXYGEN SATURATION: 98 % | DIASTOLIC BLOOD PRESSURE: 90 MMHG | SYSTOLIC BLOOD PRESSURE: 150 MMHG

## 2022-05-13 DIAGNOSIS — E78.00 HYPERCHOLESTEROLEMIA: ICD-10-CM

## 2022-05-13 DIAGNOSIS — J45.20 MILD INTERMITTENT ASTHMA WITHOUT COMPLICATION: ICD-10-CM

## 2022-05-13 DIAGNOSIS — R73.9 HYPERGLYCEMIA: ICD-10-CM

## 2022-05-13 DIAGNOSIS — Z87.891 FORMER SMOKER: Primary | ICD-10-CM

## 2022-05-13 DIAGNOSIS — I10 BENIGN ESSENTIAL HTN: ICD-10-CM

## 2022-05-13 DIAGNOSIS — R91.8 PULMONARY NODULES: ICD-10-CM

## 2022-05-13 PROCEDURE — 3008F BODY MASS INDEX DOCD: CPT | Performed by: INTERNAL MEDICINE

## 2022-05-13 PROCEDURE — 99214 OFFICE O/P EST MOD 30 MIN: CPT | Performed by: INTERNAL MEDICINE

## 2022-05-13 PROCEDURE — 3080F DIAST BP >= 90 MM HG: CPT | Performed by: INTERNAL MEDICINE

## 2022-05-13 PROCEDURE — 3077F SYST BP >= 140 MM HG: CPT | Performed by: INTERNAL MEDICINE

## 2022-05-13 RX ORDER — LOSARTAN POTASSIUM 25 MG/1
25 TABLET ORAL DAILY
Qty: 30 TABLET | Refills: 3 | Status: SHIPPED | OUTPATIENT
Start: 2022-05-13 | End: 2022-08-30

## 2022-05-13 RX ORDER — LYCOPENE 10 MG
CAPSULE ORAL
COMMUNITY

## 2022-05-13 ASSESSMENT — ENCOUNTER SYMPTOMS
FATIGUE: 0
LIGHT-HEADEDNESS: 0
NERVOUS/ANXIOUS: 0
APPETITE CHANGE: 0
BLOOD IN STOOL: 0
HEMATURIA: 0
WHEEZING: 0
HEADACHES: 0
FEVER: 0
DYSPHORIC MOOD: 0
ARTHRALGIAS: 0
ACTIVITY CHANGE: 0
DIZZINESS: 0
PALPITATIONS: 0
SLEEP DISTURBANCE: 1
SHORTNESS OF BREATH: 0
BACK PAIN: 0

## 2022-05-13 NOTE — ASSESSMENT & PLAN NOTE
BP here 150/90. recheck by me 145/85.  Here 127/88.  Was high at card office-- was adv to monitor BP at home. Plan meds if >135/85.  HR 91. Rpt 84.  Plan  Trial Losartan.  Reviewed side effects and counseled to call office with an update within the next 3-4 days.  Labs ROV.

## 2022-05-13 NOTE — PROGRESS NOTES
[facility] [place]  Dr. Erma Guillen  [address], [address]  [place] [address]  Phone: [phone]  Fax: [phone]        Patient ID: Madonna Tipton                              : 1965    Visit Date: 2022    Chief Complaint: No chief complaint on file.      Patient ID: Madonna Tipton is a 56 y.o. female.    HPI  Follow up--      Had painful lump on nipple. Had breat biopsy--  All clear.    I have personally reviewed the CT Agatston score study.  Discussed with patient.           Patient Active Problem List   Diagnosis   • At high risk for breast cancer   • History of hysterectomy for indication other than cancer   • Mild asthma   • Mild vitamin D deficiency   • Well woman exam with routine gynecological exam   • Breast mass   • Cyst of left ovary   • Anxiety   • Tachycardia   • Urinary retention   • Family history of malignant neoplasm of breast   • Encounter for gynecological examination without abnormal finding   • Hyperglycemia   • Hypercholesterolemia   • Former smoker   • Benign essential HTN   • Screening for lung cancer   • Pulmonary nodules       Past Medical History:   Diagnosis Date   • Abnormal Pap smear of cervix    • Anxiety    • Asthma    • Body mass index (BMI) greater than 99th percentile for age in overweight child    • Chlamydia    • H/O mammogram 2017   • Hypertension    • Palpitations    • Personal history of urinary disorder    • Sensation of pressure in bladder area    • Urinary retention with incomplete bladder emptying        Past Surgical History:   Procedure Laterality Date   • BREAST BIOPSY     • COLONOSCOPY     • HERNIA REPAIR  2016   • HYSTERECTOMY     • LAPAROSCOPIC TOTAL HYSTERECTOMY N/A 2018    TLH, soren salpingectomy, cystoscopy   • MYOMECTOMY           Current Outpatient Medications:   •  cholecalciferol, vitamin D3, 1,000 unit (25 mcg) tablet, Take 1,000 Units by mouth daily., Disp: , Rfl:   •  losartan  (COZAAR) 25 mg tablet, Take 1 tablet (25 mg total) by mouth daily., Disp: 30 tablet, Rfl: 3  •  lycopene 10 mg capsule, Take by mouth., Disp: , Rfl:   •  montelukast (SINGULAIR) 10 mg tablet, TAKE 1 TABLET BY MOUTH EVERY DAY, Disp: 30 tablet, Rfl: 2  •  PROAIR HFA 90 mcg/actuation inhaler, Inhale 2 puffs 4 (four) times a day as needed for shortness of breath., Disp: 18 g, Rfl: 3  •  sertraline (ZOLOFT) 50 mg tablet, TAKE 1 TABLET BY MOUTH EVERY DAY, Disp: 30 tablet, Rfl: 2  •  SYMBICORT 80-4.5 mcg/actuation inhaler, INHALE 2 PUFFS INTO THE LUNGS TWICE A DAY RINSE MOUTH WITH WATER AFTER USE, DO NOT SWALLOW., Disp: 10.2 g, Rfl: 1  •  hydrocortisone-iodoquinoL (VYTONE) 1-1 % cream, Apply topically 3 (three) times a day as needed (for itching). (Patient not taking: No sig reported), Disp: 45 g, Rfl: 0    Allergies   Allergen Reactions   • Feathers Shortness of breath   • Mold Shortness of breath   • Shellfish Derived GI intolerance       Family History   Problem Relation Age of Onset   • Colon cancer Maternal Grandfather    • Breast cancer Biological Mother    • COPD Maternal Grandmother        Social History     Tobacco Use   • Smoking status: Former Smoker     Types: Cigarettes   • Smokeless tobacco: Never Used   Substance Use Topics   • Alcohol use: Yes     Alcohol/week: 7.0 standard drinks     Types: 7 Glasses of wine per week     Comment: social   • Drug use: No       Health Maintenance   Topic Date Due   • Pneumococcal (1 - PCV) 06/25/2022 (Originally 5/27/1971)   • Zoster Vaccine (1 of 2) 07/21/2022 (Originally 5/27/2016)   • HIV Screening  05/22/2050 (Originally 5/27/1979)   • DTaP, Tdap, and Td Vaccines (2 - Td or Tdap) 06/27/2022   • Breast Cancer Screening  07/21/2023   • Colonoscopy  03/31/2027   • Influenza Vaccine  Completed   • COVID-19 Vaccine  Completed   • Meningococcal ACWY  Aged Out   • HIB Vaccines  Aged Out   • IPV Vaccines  Aged Out   • HPV Vaccines  Aged Out   • Hepatitis C Screening   "Discontinued         The following have been reviewed and updated as appropriate in this visit:   Tobacco  Allergies  Meds  Problems  Med Hx  Surg Hx  Fam Hx           Review of System  Review of Systems   Constitutional: Negative for activity change, appetite change, fatigue and fever.   Respiratory: Negative for shortness of breath and wheezing.    Cardiovascular: Negative for chest pain, palpitations and leg swelling.   Gastrointestinal: Negative for blood in stool.   Genitourinary: Negative for hematuria.   Musculoskeletal: Negative for arthralgias and back pain.   Neurological: Negative for dizziness, light-headedness and headaches.   Psychiatric/Behavioral: Positive for sleep disturbance. Negative for dysphoric mood. The patient is not nervous/anxious.        Objective     Vitals  Vitals:    05/13/22 1039   BP: (!) 150/90   BP Location: Left upper arm   Patient Position: Sitting   Pulse: 91   Temp: 36.4 °C (97.5 °F)   TempSrc: Temporal   SpO2: 98%   Weight: 68.9 kg (152 lb)   Height: 1.651 m (5' 5\")       Body mass index is 25.29 kg/m².    Physical Exam  Physical Exam  Vitals reviewed.   Constitutional:       General: She is not in acute distress.     Appearance: She is well-developed. She is not diaphoretic.   HENT:      Head: Normocephalic and atraumatic.      Right Ear: External ear normal.      Left Ear: External ear normal.      Nose: Nose normal.   Eyes:      General: No scleral icterus.        Right eye: No discharge.         Left eye: No discharge.      Conjunctiva/sclera: Conjunctivae normal.   Neck:      Thyroid: No thyromegaly.      Vascular: No JVD.      Trachea: No tracheal deviation.   Cardiovascular:      Rate and Rhythm: Normal rate and regular rhythm.      Heart sounds: Normal heart sounds. No murmur heard.  Pulmonary:      Effort: Pulmonary effort is normal. No respiratory distress.      Breath sounds: Normal breath sounds. No wheezing or rales.   Abdominal:      General: There is no " distension.      Palpations: Abdomen is soft.      Tenderness: There is no abdominal tenderness. There is no right CVA tenderness or left CVA tenderness.   Musculoskeletal:         General: No tenderness or deformity. Normal range of motion.      Cervical back: Normal range of motion and neck supple.      Right lower leg: No edema.      Left lower leg: No edema.   Skin:     General: Skin is warm and dry.      Coloration: Skin is not pale.      Findings: No erythema or rash.   Neurological:      Mental Status: She is alert and oriented to person, place, and time.      Cranial Nerves: No cranial nerve deficit.      Sensory: No sensory deficit.      Motor: No abnormal muscle tone.   Psychiatric:         Behavior: Behavior normal.         Thought Content: Thought content normal.         Judgment: Judgment normal.         Assessment/Plan     Problem List Items Addressed This Visit        Unprioritized    Benign essential HTN     BP here 150/90. recheck by me 145/85.  Here 127/88.  Was high at card office-- was adv to monitor BP at home. Plan meds if >135/85.  HR 91. Rpt 84.  Plan  Trial Losartan.  Reviewed side effects and counseled to call office with an update within the next 3-4 days.  Labs ROV.           Relevant Medications    losartan (COZAAR) 25 mg tablet    Other Relevant Orders    Comprehensive metabolic panel    Former smoker - Primary    Hypercholesterolemia     Seen card - plan to repeat labs in about 6 mo.            Relevant Orders    Lipid panel    Hyperglycemia     A1c 5.3 in 10/21.  Cont low carb diet.           Mild asthma     Doing well.   Not needed inhaler-- uses Symbicort daily..           Pulmonary nodules     Plan follow up Chest CT for high risk in 1 year.                 Return in about 4 weeks (around 6/10/2022).      Erma Guillen MD  5/13/2022

## 2022-05-23 ENCOUNTER — TELEMEDICINE (OUTPATIENT)
Dept: GYNECOLOGIC ONCOLOGY | Facility: CLINIC | Age: 57
End: 2022-05-23
Payer: COMMERCIAL

## 2022-05-23 DIAGNOSIS — N83.202 CYST OF LEFT OVARY: Primary | ICD-10-CM

## 2022-05-23 PROCEDURE — 99214 OFFICE O/P EST MOD 30 MIN: CPT | Mod: 95 | Performed by: OBSTETRICS & GYNECOLOGY

## 2022-05-23 NOTE — ASSESSMENT & PLAN NOTE
Persistent left ovarian cyst size reduced total volume 39cc reduced from 58cc July 2020.  Recommend annual US surveillance or sooner for change in symptoms.Repeat tumnor markers not indicated with resolving complex cyst. Discussed with Dr. Vasquez GYN Oncology.

## 2022-05-23 NOTE — PROGRESS NOTES
Patient ID: Amber Tipton                             : 1965  MRN: [id number]                                            Visit Date: 2022  Encounter Provider: Ciro Monroe  Referring Provider: No ref. provider found          Verification of Patient Location:  The patient affirms they are currently located in the following state:[place]     Request for Consent:  You and I are about to have a video telemedicine check-in or visit. This is allowed because you are already my patient, and you have requested it.  This video telemedicine visit will be billed to your health insurance or you, if you are self-insured.  You understand you will be responsible for any copayments or coinsurances that apply to your video telemedicine visit.  Before starting our video telemedicine visit, I am required to get your consent for this virtual check-in or visit by video telemedicine. Do you consent?      Patient Response to Request for Consent: Yes             Time Spent in Medical Discussion During This Encounter:  25 minutes      Subjective      HPI  Madonna Tipton is a 56 y.o. old female with a chief compliant of Ovarian Cyst  Madonna Tipton is a 56 y.o. WFemales/p TLH/BS 2018 for fibroid uterus and GYN history remarkable for left ovarian cyst dx 2020 presents for VIDEOTELECONFERENCE follow-up following repeat pelvic US.  Amber reports occasional left side pain associated with breast tenderness which resolves spontaneously.  It seems cyclic less than once monthly.  No change in diet, bowel function or weight gain/loss.      The following have been reviewed and updated as appropriate in this visit:   Allergies  Meds  Problems         Past Medical History:  has a past medical history of Abnormal Pap smear of cervix, Anxiety, Asthma, Body mass index (BMI) greater than 99th percentile for age in overweight child, Chlamydia, H/O mammogram (2017), Hypertension, Palpitations, Personal history of  urinary disorder, Sensation of pressure in bladder area, and Urinary retention with incomplete bladder emptying.    She has no past medical history of Arthritis.  Past Surgical History:  has a past surgical history that includes Colonoscopy (2016); Hernia repair (12/2016); Myomectomy; Laparoscopic total hysterectomy (N/A, 06/28/2018); Hysterectomy; and Breast biopsy.  Social History:   Social History     Tobacco Use   • Smoking status: Former Smoker     Types: Cigarettes   • Smokeless tobacco: Never Used   Substance Use Topics   • Alcohol use: Yes     Alcohol/week: 7.0 standard drinks     Types: 7 Glasses of wine per week     Comment: social   • Drug use: No     Family History: family history includes Breast cancer in her biological mother; COPD in her maternal grandmother; Colon cancer in her maternal grandfather.  Medications:   Current Outpatient Medications:   •  cholecalciferol, vitamin D3, 1,000 unit (25 mcg) tablet, Take 1,000 Units by mouth daily., Disp: , Rfl:   •  hydrocortisone-iodoquinoL (VYTONE) 1-1 % cream, Apply topically 3 (three) times a day as needed (for itching). (Patient not taking: No sig reported), Disp: 45 g, Rfl: 0  •  losartan (COZAAR) 25 mg tablet, Take 1 tablet (25 mg total) by mouth daily., Disp: 30 tablet, Rfl: 3  •  lycopene 10 mg capsule, Take by mouth., Disp: , Rfl:   •  montelukast (SINGULAIR) 10 mg tablet, TAKE 1 TABLET BY MOUTH EVERY DAY, Disp: 30 tablet, Rfl: 2  •  PROAIR HFA 90 mcg/actuation inhaler, Inhale 2 puffs 4 (four) times a day as needed for shortness of breath., Disp: 18 g, Rfl: 3  •  sertraline (ZOLOFT) 50 mg tablet, TAKE 1 TABLET BY MOUTH EVERY DAY, Disp: 30 tablet, Rfl: 2  •  SYMBICORT 80-4.5 mcg/actuation inhaler, INHALE 2 PUFFS INTO THE LUNGS TWICE A DAY RINSE MOUTH WITH WATER AFTER USE, DO NOT SWALLOW., Disp: 10.2 g, Rfl: 1    Allergies: is allergic to feathers, mold, and shellfish derived.     Review of Systems  Objective   Visit Vitals  LMP 05/31/2018        Physical Exam  Constitutional:       Appearance: Normal appearance.   Pulmonary:      Effort: Pulmonary effort is normal.   Neurological:      General: No focal deficit present.      Mental Status: She is alert and oriented to person, place, and time.   Psychiatric:         Mood and Affect: Mood normal.         Behavior: Behavior normal.          Pelvic US 4/20/2022  Comparison: Prior pelvic ultrasounds from 08/25/2021, 03/29/2021, and  07/20/2020.     We again note the uterus to be absent.     The right ovary appears unremarkable, measuring 1.2 x 0.8 x 0.9 cm.     The left ovary is enlarged measuring 4.5 x 4.0 x 4.2 cm, corresponding to an  ovarian volume of 39 cc compared to 43 cc in August 2021, 44 cc in March 2021,  and 58 cc in July 2020. It contains a 3.9 x 3.5 x 3.6 cm cyst with thin internal  septations; this had measured 3.7 x 3.9 x 3.7 cm in August 2021, 4.4 x 3.6 x 3.8  cm in March 2021, and 4.0 x 4.0 x 5.4 cm in July 2020.     No adnexal mass or free pelvic fluid is seen.  Assessment/Plan    Diagnosis Plan   1. Cyst of left ovary       Problem List Items Addressed This Visit        Genitourinary    Cyst of left ovary - Primary     Persistent left ovarian cyst size reduced total volume 39cc reduced from 58cc July 2020.  Recommend annual US surveillance or sooner for change in symptoms.Repeat tumnor markers not indicated with resolving complex cyst.             I spent 25 minutes on this date of service performing the following activities: obtaining history, documenting, preparing for visit, obtaining / reviewing records, providing counseling and education, independently reviewing study/studies and communicating results.    No follow-ups on file.     Ciro Monroe MD

## 2022-06-21 ENCOUNTER — OFFICE VISIT (OUTPATIENT)
Dept: PRIMARY CARE | Facility: CLINIC | Age: 57
End: 2022-06-21
Payer: COMMERCIAL

## 2022-06-21 VITALS
HEART RATE: 76 BPM | DIASTOLIC BLOOD PRESSURE: 78 MMHG | SYSTOLIC BLOOD PRESSURE: 122 MMHG | WEIGHT: 153.4 LBS | OXYGEN SATURATION: 99 % | BODY MASS INDEX: 25.56 KG/M2 | HEIGHT: 65 IN | TEMPERATURE: 97.9 F | RESPIRATION RATE: 18 BRPM

## 2022-06-21 DIAGNOSIS — I10 BENIGN ESSENTIAL HTN: Primary | ICD-10-CM

## 2022-06-21 DIAGNOSIS — R73.01 IMPAIRED FASTING GLUCOSE: ICD-10-CM

## 2022-06-21 DIAGNOSIS — E78.00 HYPERCHOLESTEROLEMIA: ICD-10-CM

## 2022-06-21 PROBLEM — N63.0 BREAST MASS: Status: RESOLVED | Noted: 2019-03-06 | Resolved: 2022-06-21

## 2022-06-21 PROBLEM — Z12.2 SCREENING FOR LUNG CANCER: Status: RESOLVED | Noted: 2021-11-05 | Resolved: 2022-06-21

## 2022-06-21 PROCEDURE — 3074F SYST BP LT 130 MM HG: CPT | Performed by: NURSE PRACTITIONER

## 2022-06-21 PROCEDURE — 3008F BODY MASS INDEX DOCD: CPT | Performed by: NURSE PRACTITIONER

## 2022-06-21 PROCEDURE — 3078F DIAST BP <80 MM HG: CPT | Performed by: NURSE PRACTITIONER

## 2022-06-21 PROCEDURE — 99213 OFFICE O/P EST LOW 20 MIN: CPT | Performed by: NURSE PRACTITIONER

## 2022-06-21 ASSESSMENT — ENCOUNTER SYMPTOMS
HYPERTENSION: 1
PALPITATIONS: 0
SHORTNESS OF BREATH: 0

## 2022-06-21 ASSESSMENT — PATIENT HEALTH QUESTIONNAIRE - PHQ9: SUM OF ALL RESPONSES TO PHQ9 QUESTIONS 1 & 2: 0

## 2022-06-21 NOTE — PROGRESS NOTES
[facility] at [address]  [place] [address]  [phone]  Fax [phone]      Patient ID: Madonna Tipton                              : 1965    Visit Date: 2022    Chief Complaint: Blood Pressure Check         Patient ID: Madonna Tipton is a 57 y.o. female.    Patient Active Problem List   Diagnosis   • At high risk for breast cancer   • History of hysterectomy for indication other than cancer   • Mild asthma   • Mild vitamin D deficiency   • Well woman exam with routine gynecological exam   • Cyst of left ovary   • Anxiety   • Tachycardia   • Urinary retention   • Family history of malignant neoplasm of breast   • Encounter for gynecological examination without abnormal finding   • Impaired fasting glucose   • Hypercholesterolemia   • Former smoker   • Benign essential HTN   • Pulmonary nodules         Current Outpatient Medications:   •  cholecalciferol, vitamin D3, 1,000 unit (25 mcg) tablet, Take 1,000 Units by mouth daily., Disp: , Rfl:   •  hydrocortisone-iodoquinoL (VYTONE) 1-1 % cream, Apply topically 3 (three) times a day as needed (for itching)., Disp: 45 g, Rfl: 0  •  lycopene 10 mg capsule, Take by mouth., Disp: , Rfl:   •  montelukast (SINGULAIR) 10 mg tablet, TAKE 1 TABLET BY MOUTH EVERY DAY, Disp: 30 tablet, Rfl: 2  •  PROAIR HFA 90 mcg/actuation inhaler, Inhale 2 puffs 4 (four) times a day as needed for shortness of breath., Disp: 18 g, Rfl: 3  •  sertraline (ZOLOFT) 50 mg tablet, TAKE 1 TABLET BY MOUTH EVERY DAY, Disp: 30 tablet, Rfl: 2  •  SYMBICORT 80-4.5 mcg/actuation inhaler, INHALE 2 PUFFS INTO THE LUNGS TWICE A DAY RINSE MOUTH WITH WATER AFTER USE, DO NOT SWALLOW., Disp: 10.2 g, Rfl: 1  •  losartan (COZAAR) 25 mg tablet, Take 1 tablet (25 mg total) by mouth daily., Disp: 30 tablet, Rfl: 3    Allergies   Allergen Reactions   • Feathers Shortness of breath   • Mold Shortness of breath   • Shellfish  "Derived GI intolerance       Social History     Tobacco Use   • Smoking status: Former Smoker     Types: Cigarettes   • Smokeless tobacco: Never Used   Substance Use Topics   • Alcohol use: Yes     Alcohol/week: 7.0 standard drinks     Types: 7 Glasses of wine per week     Comment: social   • Drug use: No       Health Maintenance   Topic Date Due   • Pneumococcal (1 - PCV) 06/25/2022 (Originally 5/27/1971)   • Zoster Vaccine (1 of 2) 07/21/2022 (Originally 5/27/2016)   • HIV Screening  05/22/2050 (Originally 5/27/1979)   • DTaP, Tdap, and Td Vaccines (2 - Td or Tdap) 06/27/2022   • COVID-19 Vaccine (4 - Booster for Moderna series) 09/12/2022   • Breast Cancer Screening  07/21/2023   • Colonoscopy  03/31/2027   • Influenza Vaccine  Completed   • Meningococcal ACWY  Aged Out   • HIB Vaccines  Aged Out   • IPV Vaccines  Aged Out   • HPV Vaccines  Aged Out   • Hepatitis C Screening  Discontinued       HPI  BP check on Losartan    Hypertension  This is a new problem. The current episode started more than 1 month ago. The problem is controlled. Pertinent negatives include no chest pain, palpitations, peripheral edema or shortness of breath. There are no associated agents to hypertension. Past treatments include angiotensin blockers. The current treatment provides significant improvement. There are no compliance problems.        The following have been reviewed and updated as appropriate in this visit:   Allergies  Meds  Problems           Review of System  Review of Systems   Respiratory: Negative for shortness of breath.    Cardiovascular: Negative for chest pain and palpitations.       Objective     Vitals  Vitals:    06/21/22 0823   BP: 122/78   BP Location: Left upper arm   Patient Position: Sitting   Pulse: 76   Resp: 18   Temp: 36.6 °C (97.9 °F)   TempSrc: Temporal   SpO2: 99%   Weight: 69.6 kg (153 lb 6.4 oz)   Height: 1.651 m (5' 5\")     Body mass index is 25.53 kg/m².    Physical Exam  Physical Exam  Vitals " reviewed.   Constitutional:       General: She is not in acute distress.     Appearance: Normal appearance. She is not diaphoretic.   Cardiovascular:      Rate and Rhythm: Normal rate and regular rhythm.      Heart sounds: No murmur heard.    No friction rub. No gallop.   Pulmonary:      Effort: Pulmonary effort is normal.      Breath sounds: Normal breath sounds. No wheezing, rhonchi or rales.   Musculoskeletal:      Right lower leg: No edema.      Left lower leg: No edema.   Neurological:      Mental Status: She is alert and oriented to person, place, and time.         Assessment/Plan     Problem List Items Addressed This Visit     Impaired fasting glucose     Stable --last A1C was normal.  Continue to monitor.     Latest Reference Range & Units 10/22/21 09:59   Hemoglobin A1C 4.8 - 5.6 % 5.3              Hypercholesterolemia     Due to repeat CMP lipids  Ordered in system in May.  12 hour fasting.     Latest Reference Range & Units 10/22/21 09:59   Cholesterol 100 - 199 mg/dL 289 (H)   Triglycerides 0 - 149 mg/dL 96   HDL >39 mg/dL 88   LDL Calculated 0 - 99 mg/dL 185 (H)   (H): Data is abnormally high           Benign essential HTN - Primary     Much improved BP  Continue Losartan 25mg daily  Follow up 6 mo.                     EMMA Miranda  6/21/2022

## 2022-06-21 NOTE — ASSESSMENT & PLAN NOTE
Stable --last A1C was normal.  Continue to monitor.     Latest Reference Range & Units 10/22/21 09:59   Hemoglobin A1C 4.8 - 5.6 % 5.3

## 2022-06-21 NOTE — ASSESSMENT & PLAN NOTE
Due to repeat CMP lipids  Ordered in system in May.  12 hour fasting.     Latest Reference Range & Units 10/22/21 09:59   Cholesterol 100 - 199 mg/dL 289 (H)   Triglycerides 0 - 149 mg/dL 96   HDL >39 mg/dL 88   LDL Calculated 0 - 99 mg/dL 185 (H)   (H): Data is abnormally high   No

## 2022-07-23 NOTE — ADDENDUM NOTE
- Recent echo below, per cardiology will repeat in-pt  - Clinically volume overloaded on admission, improving w/ IV diuresis  - CXR notable for central pulmonary vascular congestion  - BNP elevated to 153  - Questionable compliance with home diuretic regimen  -evaluated by cardiology in ED, recommend diuresis with Lasix 80 IV mg bolus followed by Lasix drip of 40 mg/hr  -transition from IV Lasix drip to IV Lasix push ( 80 IV BID )  - Strict I/Os  - 1.5L fluid restriction   - Daily weights; unknown dry weight  - Will continue to monitor on tele      Results for orders placed during the hospital encounter of 01/05/22    Echo    Interpretation Summary  · There is abnormal septal wall motion.  · The left ventricle is normal in size with normal systolic function.  · The estimated ejection fraction is 65%.  · Normal left ventricular diastolic function.  · Mild right ventricular enlargement with normal right ventricular systolic function.  · Elevated central venous pressure (15 mmHg).  · The estimated PA systolic pressure is 49 mmHg.  · There is pulmonary hypertension.       Addended by: LILIAN STEVENS on: 12/13/2021 02:16 PM     Modules accepted: Orders

## 2022-08-04 DIAGNOSIS — F41.9 ANXIETY: ICD-10-CM

## 2022-08-04 NOTE — TELEPHONE ENCOUNTER
Medicine Refill Request    Last Office: 6/21/2022   Last Consult Visit: Visit date not found  Last Telemedicine Visit: 11/5/2021 Erma Guillen MD    Next Appointment: 12/21/2022      Current Outpatient Medications:   •  cholecalciferol, vitamin D3, 1,000 unit (25 mcg) tablet, Take 1,000 Units by mouth daily., Disp: , Rfl:   •  hydrocortisone-iodoquinoL (VYTONE) 1-1 % cream, Apply topically 3 (three) times a day as needed (for itching)., Disp: 45 g, Rfl: 0  •  losartan (COZAAR) 25 mg tablet, Take 1 tablet (25 mg total) by mouth daily., Disp: 30 tablet, Rfl: 3  •  lycopene 10 mg capsule, Take by mouth., Disp: , Rfl:   •  montelukast (SINGULAIR) 10 mg tablet, TAKE 1 TABLET BY MOUTH EVERY DAY, Disp: 30 tablet, Rfl: 2  •  PROAIR HFA 90 mcg/actuation inhaler, Inhale 2 puffs 4 (four) times a day as needed for shortness of breath., Disp: 18 g, Rfl: 3  •  sertraline (ZOLOFT) 50 mg tablet, TAKE 1 TABLET BY MOUTH EVERY DAY, Disp: 30 tablet, Rfl: 2  •  SYMBICORT 80-4.5 mcg/actuation inhaler, INHALE 2 PUFFS INTO THE LUNGS TWICE A DAY RINSE MOUTH WITH WATER AFTER USE, DO NOT SWALLOW., Disp: 10.2 g, Rfl: 1      BP Readings from Last 3 Encounters:   06/21/22 122/78   05/13/22 (!) 150/90   12/20/21 (!) 180/71       Recent Lab results:  Lab Results   Component Value Date    CHOL 289 (H) 10/22/2021   ,   Lab Results   Component Value Date    HDL 88 10/22/2021   ,   Lab Results   Component Value Date    LDLCALC 185 (H) 10/22/2021   ,   Lab Results   Component Value Date    TRIG 96 10/22/2021        Lab Results   Component Value Date    GLUCOSE 111 (H) 10/22/2021   ,   Lab Results   Component Value Date    HGBA1C 5.3 10/22/2021         Lab Results   Component Value Date    CREATININE 0.64 10/22/2021       Lab Results   Component Value Date    TSH 0.975 07/08/2021

## 2022-08-05 RX ORDER — SERTRALINE HYDROCHLORIDE 50 MG/1
TABLET, FILM COATED ORAL
Qty: 30 TABLET | Refills: 2 | Status: SHIPPED | OUTPATIENT
Start: 2022-08-05 | End: 2022-09-08 | Stop reason: SDUPTHER

## 2022-08-30 DIAGNOSIS — I10 BENIGN ESSENTIAL HTN: ICD-10-CM

## 2022-08-30 DIAGNOSIS — J45.909 MILD ASTHMA, UNSPECIFIED WHETHER COMPLICATED, UNSPECIFIED WHETHER PERSISTENT: ICD-10-CM

## 2022-08-30 RX ORDER — MONTELUKAST SODIUM 10 MG/1
TABLET ORAL
Qty: 30 TABLET | Refills: 2 | Status: SHIPPED | OUTPATIENT
Start: 2022-08-30 | End: 2022-10-11 | Stop reason: SDUPTHER

## 2022-08-30 RX ORDER — LOSARTAN POTASSIUM 25 MG/1
25 TABLET ORAL DAILY
Qty: 30 TABLET | Refills: 3 | Status: SHIPPED | OUTPATIENT
Start: 2022-08-30 | End: 2022-12-21 | Stop reason: ALTCHOICE

## 2022-08-30 NOTE — TELEPHONE ENCOUNTER
Medicine Refill Request    Last Office: 6/21/2022   Last Consult Visit: Visit date not found  Last Telemedicine Visit: 11/5/2021 Erma Guillen MD    Next Appointment: 12/21/2022      Current Outpatient Medications:   •  sertraline (ZOLOFT) 50 mg tablet, TAKE 1 TABLET BY MOUTH EVERY DAY, Disp: 30 tablet, Rfl: 2  •  cholecalciferol, vitamin D3, 1,000 unit (25 mcg) tablet, Take 1,000 Units by mouth daily., Disp: , Rfl:   •  hydrocortisone-iodoquinoL (VYTONE) 1-1 % cream, Apply topically 3 (three) times a day as needed (for itching)., Disp: 45 g, Rfl: 0  •  losartan (COZAAR) 25 mg tablet, Take 1 tablet (25 mg total) by mouth daily., Disp: 30 tablet, Rfl: 3  •  lycopene 10 mg capsule, Take by mouth., Disp: , Rfl:   •  montelukast (SINGULAIR) 10 mg tablet, TAKE 1 TABLET BY MOUTH EVERY DAY, Disp: 30 tablet, Rfl: 2  •  PROAIR HFA 90 mcg/actuation inhaler, Inhale 2 puffs 4 (four) times a day as needed for shortness of breath., Disp: 18 g, Rfl: 3  •  SYMBICORT 80-4.5 mcg/actuation inhaler, INHALE 2 PUFFS INTO THE LUNGS TWICE A DAY RINSE MOUTH WITH WATER AFTER USE, DO NOT SWALLOW., Disp: 10.2 g, Rfl: 1      BP Readings from Last 3 Encounters:   06/21/22 122/78   05/13/22 (!) 150/90   12/20/21 (!) 180/71       Recent Lab results:  Lab Results   Component Value Date    CHOL 289 (H) 10/22/2021   ,   Lab Results   Component Value Date    HDL 88 10/22/2021   ,   Lab Results   Component Value Date    LDLCALC 185 (H) 10/22/2021   ,   Lab Results   Component Value Date    TRIG 96 10/22/2021        Lab Results   Component Value Date    GLUCOSE 111 (H) 10/22/2021   ,   Lab Results   Component Value Date    HGBA1C 5.3 10/22/2021         Lab Results   Component Value Date    CREATININE 0.64 10/22/2021       Lab Results   Component Value Date    TSH 0.975 07/08/2021

## 2022-09-08 DIAGNOSIS — F41.9 ANXIETY: ICD-10-CM

## 2022-09-08 RX ORDER — SERTRALINE HYDROCHLORIDE 50 MG/1
50 TABLET, FILM COATED ORAL
Qty: 90 TABLET | Refills: 0 | Status: SHIPPED | OUTPATIENT
Start: 2022-09-08 | End: 2022-12-21 | Stop reason: SDUPTHER

## 2022-09-08 NOTE — TELEPHONE ENCOUNTER
Medicine Refill Request    Last Office: 6/21/2022   Last Consult Visit: Visit date not found  Last Telemedicine Visit: 11/5/2021 Erma Guillen MD    Next Appointment: 12/21/2022      Current Outpatient Medications:     sertraline (ZOLOFT) 50 mg tablet, TAKE 1 TABLET BY MOUTH EVERY DAY, Disp: 30 tablet, Rfl: 2    cholecalciferol, vitamin D3, 1,000 unit (25 mcg) tablet, Take 1,000 Units by mouth daily., Disp: , Rfl:     hydrocortisone-iodoquinoL (VYTONE) 1-1 % cream, Apply topically 3 (three) times a day as needed (for itching)., Disp: 45 g, Rfl: 0    losartan (COZAAR) 25 mg tablet, TAKE 1 TABLET (25 MG TOTAL) BY MOUTH DAILY., Disp: 30 tablet, Rfl: 3    lycopene 10 mg capsule, Take by mouth., Disp: , Rfl:     montelukast (SINGULAIR) 10 mg tablet, TAKE 1 TABLET BY MOUTH EVERY DAY, Disp: 30 tablet, Rfl: 2    PROAIR HFA 90 mcg/actuation inhaler, Inhale 2 puffs 4 (four) times a day as needed for shortness of breath., Disp: 18 g, Rfl: 3    SYMBICORT 80-4.5 mcg/actuation inhaler, INHALE 2 PUFFS INTO THE LUNGS TWICE A DAY RINSE MOUTH WITH WATER AFTER USE, DO NOT SWALLOW., Disp: 10.2 g, Rfl: 1      BP Readings from Last 3 Encounters:   06/21/22 122/78   05/13/22 (!) 150/90   12/20/21 (!) 180/71       Recent Lab results:  Lab Results   Component Value Date    CHOL 289 (H) 10/22/2021   ,   Lab Results   Component Value Date    HDL 88 10/22/2021   ,   Lab Results   Component Value Date    LDLCALC 185 (H) 10/22/2021   ,   Lab Results   Component Value Date    TRIG 96 10/22/2021        Lab Results   Component Value Date    GLUCOSE 111 (H) 10/22/2021   ,   Lab Results   Component Value Date    HGBA1C 5.3 10/22/2021         Lab Results   Component Value Date    CREATININE 0.64 10/22/2021       Lab Results   Component Value Date    TSH 0.975 07/08/2021

## 2022-09-26 ENCOUNTER — HOSPITAL ENCOUNTER (OUTPATIENT)
Dept: RADIOLOGY | Facility: HOSPITAL | Age: 57
Discharge: HOME | End: 2022-09-26
Attending: SURGERY
Payer: COMMERCIAL

## 2022-09-26 DIAGNOSIS — Z80.3 FAMILY HISTORY OF MALIGNANT NEOPLASM OF BREAST: ICD-10-CM

## 2022-09-26 PROCEDURE — 77063 BREAST TOMOSYNTHESIS BI: CPT

## 2022-10-05 ENCOUNTER — OFFICE VISIT (OUTPATIENT)
Dept: GYNECOLOGIC ONCOLOGY | Facility: CLINIC | Age: 57
End: 2022-10-05
Payer: COMMERCIAL

## 2022-10-05 VITALS
HEIGHT: 65 IN | TEMPERATURE: 97.8 F | RESPIRATION RATE: 20 BRPM | HEART RATE: 89 BPM | SYSTOLIC BLOOD PRESSURE: 139 MMHG | OXYGEN SATURATION: 99 % | WEIGHT: 158.4 LBS | BODY MASS INDEX: 26.39 KG/M2 | DIASTOLIC BLOOD PRESSURE: 86 MMHG

## 2022-10-05 DIAGNOSIS — Z91.89 AT HIGH RISK FOR BREAST CANCER: ICD-10-CM

## 2022-10-05 DIAGNOSIS — Z01.419 WELL WOMAN EXAM WITH ROUTINE GYNECOLOGICAL EXAM: Primary | ICD-10-CM

## 2022-10-05 DIAGNOSIS — Z90.710 HISTORY OF HYSTERECTOMY FOR INDICATION OTHER THAN CANCER: ICD-10-CM

## 2022-10-05 PROCEDURE — S0612 ANNUAL GYNECOLOGICAL EXAMINA: HCPCS | Performed by: OBSTETRICS & GYNECOLOGY

## 2022-10-05 RX ORDER — LOSARTAN POTASSIUM 25 MG/1
25 TABLET ORAL DAILY
COMMUNITY
End: 2022-10-11 | Stop reason: SDUPTHER

## 2022-10-05 RX ORDER — ROSUVASTATIN CALCIUM 10 MG/1
10 TABLET, COATED ORAL DAILY
COMMUNITY
End: 2022-12-21 | Stop reason: DRUGHIGH

## 2022-10-05 ASSESSMENT — ENCOUNTER SYMPTOMS
RESPIRATORY NEGATIVE: 1
NEUROLOGICAL NEGATIVE: 1
ALLERGIC/IMMUNOLOGIC COMMENTS: REVIEWED AND UPDATED
EYES NEGATIVE: 1
PSYCHIATRIC NEGATIVE: 1
ENDOCRINE NEGATIVE: 1
CARDIOVASCULAR NEGATIVE: 1
CONSTITUTIONAL NEGATIVE: 1
GASTROINTESTINAL NEGATIVE: 1
MUSCULOSKELETAL NEGATIVE: 1

## 2022-10-05 NOTE — ASSESSMENT & PLAN NOTE
Healthy Well woman exam, up to date for breast and colon screening. Recommend genetic counseling for family history of breast and colon CA.  Menopause mangement counseling and healthy diet, exercise and sexual health reviewed.    Yes

## 2022-10-05 NOTE — PATIENT INSTRUCTIONS
Patient counseled about exercise, lifestyle, stress and calcium/vitamin D intake.  Reviewed self breast exam as well as breast, cervix and colon cancer screening.  Recommend alcohol: No more than 7 alcoholic drinks per week.  Calcium supplementation: Dietary calcium 7979-8414 mg daily, vitamin D 1000 IU daily  Diet: Minimize sugar intake by avoiding sugar drinks including soda.  Increase intake of water and plant-based foods.  Exercise: Exercise regularly 30 minutes, 3-5 times per week minimum.  Skin health: Protect her exposed skin with sunscreen daily.

## 2022-10-05 NOTE — PROGRESS NOTES
"Visit Date: 10/5/2022   Madonna Tipton is 57 y.o. female  presenting today for Annual GYN Exam    Madonna Tipton is a 57 y.o.  Menopausal female with no history of HRT and GYN history remarkable for TLH/ BS large fibroid uterus and benign ovarian cyst followed x 2 years presents for Annual exam  GYN Screening history:  Mammogram - s/p cyst aspiration  benign; --  IMPRESSION:  1.  No evidence of malignancy. Annual screening mammography is recommended.  2.  In addition, given the breast density and calculated lifetime risk of 23.3%  according to a modified Isa model, consideration should be given to screening  MRI to supplement annual mammography. Our nurse navigators are available to  provide additional information to you and/or the patient, and can assist with  arranging high risk evaluation by a clinical breast specialist  ([phone]).  Written results will be conveyed to the patient.     FINAL ASSESSMENT - BI-RADS CATEGORY 2 - BENIGN FINDING  (NDH)  PAP indicated - NA  Colonoscopy -   Menopausal symptoms causing distress or limiting daiy activities - hot flashes not disruptive sleep or daily living  Pelvic Pain or abnormal bleeding - none  Sexually active  - male partner; Distressing sexual dysfunction vaginal dryness  Breast pain, mass or other symptoms - cyst aspirated benign  Urinary incontinence causing distress or limiting daily activity - irregular without distress or daily pad requirement    Menstrual History:  OB History        2    Para   1    Term                AB   1    Living   1       SAB        IAB        Ectopic        Multiple        Live Births                    Patient's last menstrual period was 2018.        Allergies  Problems         Visit Vitals  /86 (BP Location: Left upper arm, Patient Position: Sitting)   Pulse 89   Temp 36.6 °C (97.8 °F) (Oral)   Resp 20   Ht 1.651 m (5' 5\")   Wt 71.8 kg (158 lb 6.4 oz)   LMP 2018   SpO2 99% "   BMI 26.36 kg/m²     The following have been reviewed and updated as appropriate in this visit:  Medications:   Current Outpatient Medications:     cholecalciferol, vitamin D3, 1,000 unit (25 mcg) tablet, Take 1,000 Units by mouth daily., Disp: , Rfl:     hydrocortisone-iodoquinoL (VYTONE) 1-1 % cream, Apply topically 3 (three) times a day as needed (for itching)., Disp: 45 g, Rfl: 0    losartan (COZAAR) 25 mg tablet, Take 25 mg by mouth daily., Disp: , Rfl:     lycopene 10 mg capsule, Take by mouth., Disp: , Rfl:     montelukast (SINGULAIR) 10 mg tablet, TAKE 1 TABLET BY MOUTH EVERY DAY, Disp: 30 tablet, Rfl: 2    PROAIR HFA 90 mcg/actuation inhaler, Inhale 2 puffs 4 (four) times a day as needed for shortness of breath., Disp: 18 g, Rfl: 3    rosuvastatin (CRESTOR) 10 mg tablet, Take 10 mg by mouth daily., Disp: , Rfl:     sertraline (ZOLOFT) 50 mg tablet, Take 1 tablet (50 mg total) by mouth once daily., Disp: 90 tablet, Rfl: 0    SYMBICORT 80-4.5 mcg/actuation inhaler, INHALE 2 PUFFS INTO THE LUNGS TWICE A DAY RINSE MOUTH WITH WATER AFTER USE, DO NOT SWALLOW., Disp: 10.2 g, Rfl: 1    losartan (COZAAR) 25 mg tablet, TAKE 1 TABLET (25 MG TOTAL) BY MOUTH DAILY., Disp: 30 tablet, Rfl: 3    Allergies: is allergic to feathers, mold, and shellfish derived.     Past Medical History:  has a past medical history of Abnormal Pap smear of cervix, Anxiety, Asthma, Body mass index (BMI) greater than 99th percentile for age in overweight child, Chlamydia, H/O mammogram (05/2017), Hypertension, Palpitations, Personal history of urinary disorder, Sensation of pressure in bladder area, and Urinary retention with incomplete bladder emptying.    She has no past medical history of Arthritis.  Past Surgical History:  has a past surgical history that includes Colonoscopy (2016); Hernia repair (12/2016); Myomectomy; Laparoscopic total hysterectomy (N/A, 06/28/2018); Hysterectomy; and Breast biopsy.  Family History: family  history includes Breast cancer in her biological mother; COPD in her maternal grandmother; Colon cancer in her maternal grandfather.  Social History:   Social History     Tobacco Use    Smoking status: Former Smoker     Types: Cigarettes    Smokeless tobacco: Never Used   Substance Use Topics    Alcohol use: Yes     Alcohol/week: 7.0 standard drinks     Types: 7 Glasses of wine per week     Comment: social    Drug use: No           Review of Systems   Constitutional: Negative.    HENT: Negative.    Eyes: Negative.    Respiratory: Negative.    Cardiovascular: Negative.    Gastrointestinal: Negative.    Endocrine: Negative.    Genitourinary:        As per HPI   Musculoskeletal: Negative.    Skin: Negative.    Allergic/Immunologic:        Reviewed and updated   Neurological: Negative.    Psychiatric/Behavioral: Negative.      Physical Exam  Constitutional:       Appearance: Normal appearance. She is well-developed.   Genitourinary:      Pelvic exam was performed with patient in the lithotomy position.      Urethra, vagina, rectum and urethral meatus normal.      Bladder is not tender.       Cervix is absent.      Uterus is absent.      No right or left adnexal mass present.        Right Adnexa: not tender and no mass present.     Left Adnexa: not tender and no mass present.Pelvic exam was performed with patient in lithotomy exam position.     External female genitalia normal.   Urethral meatus normal.   Urethra normal.   Normal bladder. No bladder tenderness.   Vagina normal. The cervix is absent.   Uterus is absent. Rectal exam normal. HENT:      Head: Normocephalic.   Neck:      Thyroid: No thyromegaly.   Cardiovascular:      Rate and Rhythm: Normal rate.   Pulmonary:      Effort: Pulmonary effort is normal.   Chest:   Breasts:      Leonid Score is 5.      Right: No mass, nipple discharge, skin change, axillary adenopathy or supraclavicular adenopathy.      Left: Mass (2 cm firmness, non tender and mobile left  inner upper quadrant c/w cyst aspiration site) present. No nipple discharge, skin change, axillary adenopathy or supraclavicular adenopathy.       Abdominal:      General: There is no distension.      Palpations: Abdomen is soft. There is no mass.      Tenderness: There is no abdominal tenderness. There is no rebound.   Musculoskeletal:         General: No deformity.      Cervical back: Neck supple.   Lymphadenopathy:      Cervical: No cervical adenopathy.      Upper Body:      Right upper body: No supraclavicular, axillary or pectoral adenopathy.      Left upper body: No supraclavicular, axillary or pectoral adenopathy.   Neurological:      General: No focal deficit present.      Mental Status: She is alert and oriented to person, place, and time.   Skin:     General: Skin is warm and dry.   Psychiatric:         Mood and Affect: Mood normal.   Vitals and nursing note reviewed. Exam conducted with a chaperone present.         Assessment/Plan       Problem List Items Addressed This Visit        Other    At high risk for breast cancer    Overview     James Garcias 24% lifetime risk           Relevant Orders    Ambulatory Referral to [facility]    History of hysterectomy for indication other than cancer    Overview     [facility] BS 6/28/2018 for Fibroid Uterus (18week ) Path benign 972 gms           Well woman exam with routine gynecological exam - Primary    Current Assessment & Plan     Healthy Well woman exam, up to date for breast and colon screening. Recommend genetic counseling for family history of breast and colon CA.  Menopause mangement counseling and healthy diet, exercise and sexual health reviewed.                Return in about 1 year (around 10/5/2023) for Annual GYN Exam.     Ciro Monroe MD

## 2022-10-07 ENCOUNTER — TELEPHONE (OUTPATIENT)
Dept: SCHEDULING | Age: 57
End: 2022-10-07
Payer: COMMERCIAL

## 2022-10-07 NOTE — TELEPHONE ENCOUNTER
I spoke with the patient and the patient wants to go to [place] for their CT Scan, can you please authorize this CT for Virgie Ramos. Thanks, Ailyn

## 2022-10-10 NOTE — TELEPHONE ENCOUNTER
Stephane Correia, here is the info for the site this patient would like her CT scan precerted to:    [facility]   NPI: [id number]   Tax ID:  [id number]   Address:  [address] [place] [address], [facility], [address], [place] [address]   Phone: [phone]

## 2022-10-11 DIAGNOSIS — J45.909 MILD ASTHMA, UNSPECIFIED WHETHER COMPLICATED, UNSPECIFIED WHETHER PERSISTENT: ICD-10-CM

## 2022-10-11 RX ORDER — MONTELUKAST SODIUM 10 MG/1
10 TABLET ORAL
Qty: 90 TABLET | Refills: 0 | Status: SHIPPED | OUTPATIENT
Start: 2022-10-11 | End: 2022-12-21 | Stop reason: SDUPTHER

## 2022-10-11 RX ORDER — LOSARTAN POTASSIUM 25 MG/1
25 TABLET ORAL DAILY
Qty: 90 TABLET | Refills: 0 | Status: SHIPPED | OUTPATIENT
Start: 2022-10-11 | End: 2022-12-21 | Stop reason: SDUPTHER

## 2022-10-11 NOTE — TELEPHONE ENCOUNTER
Medicine Refill Request    Last Office: 6/21/2022   Last Consult Visit: Visit date not found  Last Telemedicine Visit: 11/5/2021 Erma Guillen MD    Next Appointment: 12/21/2022      Current Outpatient Medications:     cholecalciferol, vitamin D3, 1,000 unit (25 mcg) tablet, Take 1,000 Units by mouth daily., Disp: , Rfl:     hydrocortisone-iodoquinoL (VYTONE) 1-1 % cream, Apply topically 3 (three) times a day as needed (for itching)., Disp: 45 g, Rfl: 0    losartan (COZAAR) 25 mg tablet, TAKE 1 TABLET (25 MG TOTAL) BY MOUTH DAILY., Disp: 30 tablet, Rfl: 3    losartan (COZAAR) 25 mg tablet, Take 25 mg by mouth daily., Disp: , Rfl:     lycopene 10 mg capsule, Take by mouth., Disp: , Rfl:     montelukast (SINGULAIR) 10 mg tablet, TAKE 1 TABLET BY MOUTH EVERY DAY, Disp: 30 tablet, Rfl: 2    PROAIR HFA 90 mcg/actuation inhaler, Inhale 2 puffs 4 (four) times a day as needed for shortness of breath., Disp: 18 g, Rfl: 3    rosuvastatin (CRESTOR) 10 mg tablet, Take 10 mg by mouth daily., Disp: , Rfl:     sertraline (ZOLOFT) 50 mg tablet, Take 1 tablet (50 mg total) by mouth once daily., Disp: 90 tablet, Rfl: 0    SYMBICORT 80-4.5 mcg/actuation inhaler, INHALE 2 PUFFS INTO THE LUNGS TWICE A DAY RINSE MOUTH WITH WATER AFTER USE, DO NOT SWALLOW., Disp: 10.2 g, Rfl: 1      BP Readings from Last 3 Encounters:   10/05/22 139/86   06/21/22 122/78   05/13/22 (!) 150/90       Recent Lab results:  Lab Results   Component Value Date    CHOL 289 (H) 10/22/2021   ,   Lab Results   Component Value Date    HDL 88 10/22/2021   ,   Lab Results   Component Value Date    LDLCALC 185 (H) 10/22/2021   ,   Lab Results   Component Value Date    TRIG 96 10/22/2021        Lab Results   Component Value Date    GLUCOSE 111 (H) 10/22/2021   ,   Lab Results   Component Value Date    HGBA1C 5.3 10/22/2021         Lab Results   Component Value Date    CREATININE 0.64 10/22/2021       Lab Results   Component Value Date    TSH 0.975 07/08/2021

## 2022-10-19 ENCOUNTER — TELEPHONE (OUTPATIENT)
Dept: GENETICS | Facility: HOSPITAL | Age: 57
End: 2022-10-19
Payer: COMMERCIAL

## 2022-10-19 NOTE — TELEPHONE ENCOUNTER
I called Madonna Tipton regarding a referral from Dr. Monroe for cancer genetic counseling. Madonna Tipton is interested in scheduling but would prefer to call back to do so at a later time.

## 2022-10-26 ENCOUNTER — TELEPHONE (OUTPATIENT)
Dept: PRIMARY CARE | Facility: CLINIC | Age: 57
End: 2022-10-26
Payer: COMMERCIAL

## 2022-10-26 ENCOUNTER — APPOINTMENT (OUTPATIENT)
Dept: URBAN - METROPOLITAN AREA CLINIC 203 | Age: 57
Setting detail: DERMATOLOGY
End: 2022-10-26

## 2022-10-26 DIAGNOSIS — R91.8 MULTIPLE LUNG NODULES ON CT: ICD-10-CM

## 2022-10-26 DIAGNOSIS — Z11.52 ENCOUNTER FOR SCREENING FOR COVID-19: ICD-10-CM

## 2022-10-26 DIAGNOSIS — E04.2 MULTIPLE THYROID NODULES: Primary | ICD-10-CM

## 2022-10-26 DIAGNOSIS — L57.8 OTHER SKIN CHANGES DUE TO CHRONIC EXPOSURE TO NONIONIZING RADIATION: ICD-10-CM

## 2022-10-26 DIAGNOSIS — L90.8 OTHER ATROPHIC DISORDERS OF SKIN: ICD-10-CM

## 2022-10-26 PROCEDURE — OTHER SUNSCREEN RECOMMENDATIONS: OTHER

## 2022-10-26 PROCEDURE — OTHER PRESCRIPTION MEDICATION MANAGEMENT: OTHER

## 2022-10-26 PROCEDURE — OTHER MIPS QUALITY: OTHER

## 2022-10-26 PROCEDURE — OTHER SCREENING FOR COVID-19: OTHER

## 2022-10-26 PROCEDURE — 99203 OFFICE O/P NEW LOW 30 MIN: CPT

## 2022-10-26 PROCEDURE — OTHER COUNSELING: OTHER

## 2022-10-26 ASSESSMENT — LOCATION DETAILED DESCRIPTION DERM
LOCATION DETAILED: LEFT INFERIOR CENTRAL MALAR CHEEK
LOCATION DETAILED: EPIGASTRIC SKIN
LOCATION DETAILED: LEFT MEDIAL BREAST 11-12:00 REGION
LOCATION DETAILED: LEFT MEDIAL BREAST 10-11:00 REGION

## 2022-10-26 ASSESSMENT — LOCATION SIMPLE DESCRIPTION DERM
LOCATION SIMPLE: LEFT BREAST
LOCATION SIMPLE: LEFT CHEEK
LOCATION SIMPLE: ABDOMEN

## 2022-10-26 ASSESSMENT — LOCATION ZONE DERM
LOCATION ZONE: FACE
LOCATION ZONE: TRUNK

## 2022-10-26 NOTE — TELEPHONE ENCOUNTER
Left message with patient. 4mm and 7mm stable ndules. New ovoid nodule in anterior L lobe measuring 2x4mm - adjacent markings favoring inflammation - will discuss repeat CT in 3-6 months. After that consider repeat at 18-24 months.    Incidental thyroid nodules - recent TSH unremarkable. Recommend thyroid US with TSH.

## 2022-10-26 NOTE — PROCEDURE: PRESCRIPTION MEDICATION MANAGEMENT
Initiate Treatment: OTC rapid wrinkle repair every other day\\nAmlactin to chest
Detail Level: Zone
Render In Strict Bullet Format?: No

## 2022-10-26 NOTE — TELEPHONE ENCOUNTER
Message left on results voicemail as follows:    Pt would like someone to call and discuss recent chest ct results

## 2022-11-03 ENCOUNTER — TELEPHONE (OUTPATIENT)
Dept: PRIMARY CARE | Facility: CLINIC | Age: 57
End: 2022-11-03

## 2022-11-03 ENCOUNTER — TELEPHONE (OUTPATIENT)
Dept: PRIMARY CARE | Facility: CLINIC | Age: 57
End: 2022-11-03
Payer: COMMERCIAL

## 2022-11-03 DIAGNOSIS — R91.8 PULMONARY NODULES: ICD-10-CM

## 2022-11-03 DIAGNOSIS — E04.2 MULTIPLE THYROID NODULES: ICD-10-CM

## 2022-11-03 DIAGNOSIS — R91.8 MULTIPLE LUNG NODULES: Primary | ICD-10-CM

## 2022-11-03 NOTE — TELEPHONE ENCOUNTER
Notified pt reviewed results and recommendations . Pt will have repeat CT and US at [facility] , orders mailed to pt as requested

## 2022-11-03 NOTE — TELEPHONE ENCOUNTER
----- Message from Juan C Hartmann, DO sent at 11/3/2022 11:51 AM EDT -----  Please call patient. It seems as though she has seen her CT results. Lung nodules look stable; there was a new small nodule develop in L lower lobe (2x4mm). Recommendations are to repeat CT scan in 3-6 months. Ok to obtain anywhere in between that time period. Also we should obtain a dedicated thyroid ultrasound. These orders will be placed.

## 2022-11-03 NOTE — TELEPHONE ENCOUNTER
Pt going to [place]. Please work on precert if needed it and call pt to let her know when she is ready to schedule.

## 2022-11-08 LAB
T4 FREE SERPL-MCNC: 1.1 NG/DL (ref 0.82–1.77)
TSH SERPL DL<=0.005 MIU/L-ACNC: 1.23 UIU/ML (ref 0.45–4.5)

## 2022-11-09 NOTE — TELEPHONE ENCOUNTER
Pending approval.   Per [employer]- anticipated Determination Date is 11/11  waiting on approval or denial     Order ID: [id number]

## 2022-11-28 ENCOUNTER — OFFICE VISIT (OUTPATIENT)
Dept: PRIMARY CARE | Facility: CLINIC | Age: 57
End: 2022-11-28
Payer: COMMERCIAL

## 2022-11-28 VITALS
SYSTOLIC BLOOD PRESSURE: 134 MMHG | TEMPERATURE: 97.9 F | DIASTOLIC BLOOD PRESSURE: 84 MMHG | WEIGHT: 159 LBS | OXYGEN SATURATION: 98 % | HEIGHT: 65 IN | BODY MASS INDEX: 26.49 KG/M2 | RESPIRATION RATE: 17 BRPM | HEART RATE: 85 BPM

## 2022-11-28 DIAGNOSIS — E04.2 MULTIPLE THYROID NODULES: ICD-10-CM

## 2022-11-28 DIAGNOSIS — R05.8 POST-VIRAL COUGH SYNDROME: Primary | ICD-10-CM

## 2022-11-28 PROCEDURE — 99213 OFFICE O/P EST LOW 20 MIN: CPT | Performed by: STUDENT IN AN ORGANIZED HEALTH CARE EDUCATION/TRAINING PROGRAM

## 2022-11-28 PROCEDURE — 3008F BODY MASS INDEX DOCD: CPT | Performed by: STUDENT IN AN ORGANIZED HEALTH CARE EDUCATION/TRAINING PROGRAM

## 2022-11-28 PROCEDURE — 3079F DIAST BP 80-89 MM HG: CPT | Performed by: STUDENT IN AN ORGANIZED HEALTH CARE EDUCATION/TRAINING PROGRAM

## 2022-11-28 PROCEDURE — 3075F SYST BP GE 130 - 139MM HG: CPT | Performed by: STUDENT IN AN ORGANIZED HEALTH CARE EDUCATION/TRAINING PROGRAM

## 2022-11-28 RX ORDER — PREDNISONE 20 MG/1
40 TABLET ORAL DAILY
Qty: 10 TABLET | Refills: 0 | Status: SHIPPED | OUTPATIENT
Start: 2022-11-28 | End: 2022-12-21 | Stop reason: ALTCHOICE

## 2022-11-28 NOTE — PROGRESS NOTES
[facility] [place]  Dr. Juan C Hartmann  [address], [address]  [place] [address]  Phone: [phone]  Fax: [phone]        Patient ID: Madonna Tipton                              : 1965    Visit Date: 2022    Chief Complaint: Cough (Was sick 2 weeks ago, cough is still lingering no color in mucus. Heavy chest.)      Patient ID: Madonna Tipton is a 57 y.o. female.    HPI  HPI    Initial URI 2 weeks ago - rhinorrhea, cough, congestion. Son was sick prior, was source  Does have history of asthma  Using albuterol q3h  Not wheezing    Patient Active Problem List   Diagnosis    At high risk for breast cancer    History of hysterectomy for indication other than cancer    Mild asthma    Mild vitamin D deficiency    Well woman exam with routine gynecological exam    Cyst of left ovary    Anxiety    Tachycardia    Urinary retention    Family history of malignant neoplasm of breast    Encounter for gynecological examination without abnormal finding    Impaired fasting glucose    Hypercholesterolemia    Former smoker    Benign essential HTN    Pulmonary nodules    Post-viral cough syndrome    Multiple thyroid nodules       Past Medical History:   Diagnosis Date    Abnormal Pap smear of cervix     Anxiety     Asthma     Body mass index (BMI) greater than 99th percentile for age in overweight child     Chlamydia     H/O mammogram 2017    Hypertension     Palpitations     Personal history of urinary disorder     Sensation of pressure in bladder area     Urinary retention with incomplete bladder emptying        Past Surgical History:   Procedure Laterality Date    BREAST BIOPSY      COLONOSCOPY  2016    HERNIA REPAIR  2016    HYSTERECTOMY      LAPAROSCOPIC TOTAL HYSTERECTOMY N/A 2018    [facility], soren salpingectomy, cystoscopy    MYOMECTOMY           Current Outpatient Medications:     predniSONE (DELTASONE) 20 mg tablet, Take  2 tablets (40 mg total) by mouth daily for 5 days., Disp: 10 tablet, Rfl: 0    cholecalciferol, vitamin D3, 1,000 unit (25 mcg) tablet, Take 1,000 Units by mouth daily., Disp: , Rfl:     hydrocortisone-iodoquinoL (VYTONE) 1-1 % cream, Apply topically 3 (three) times a day as needed (for itching)., Disp: 45 g, Rfl: 0    losartan (COZAAR) 25 mg tablet, TAKE 1 TABLET (25 MG TOTAL) BY MOUTH DAILY., Disp: 30 tablet, Rfl: 3    losartan (COZAAR) 25 mg tablet, Take 1 tablet (25 mg total) by mouth daily., Disp: 90 tablet, Rfl: 0    lycopene 10 mg capsule, Take by mouth., Disp: , Rfl:     montelukast (SINGULAIR) 10 mg tablet, Take 1 tablet (10 mg total) by mouth once daily., Disp: 90 tablet, Rfl: 0    PROAIR HFA 90 mcg/actuation inhaler, Inhale 2 puffs 4 (four) times a day as needed for shortness of breath., Disp: 18 g, Rfl: 3    rosuvastatin (CRESTOR) 10 mg tablet, Take 10 mg by mouth daily., Disp: , Rfl:     sertraline (ZOLOFT) 50 mg tablet, Take 1 tablet (50 mg total) by mouth once daily., Disp: 90 tablet, Rfl: 0    SYMBICORT 80-4.5 mcg/actuation inhaler, INHALE 2 PUFFS INTO THE LUNGS TWICE A DAY RINSE MOUTH WITH WATER AFTER USE, DO NOT SWALLOW., Disp: 10.2 g, Rfl: 1    Allergies   Allergen Reactions    Feathers Shortness of breath    Mold Shortness of breath    Shellfish Derived GI intolerance       Family History   Problem Relation Age of Onset    Colon cancer Maternal Grandfather     Breast cancer Biological Mother     COPD Maternal Grandmother        Social History     Tobacco Use    Smoking status: Former     Types: Cigarettes    Smokeless tobacco: Never   Substance Use Topics    Alcohol use: Yes     Alcohol/week: 7.0 standard drinks     Types: 7 Glasses of wine per week     Comment: social    Drug use: No       Health Maintenance   Topic Date Due    Pneumococcal (1 - PCV) Never done    Zoster Vaccine (1 of 2) Never done    DTaP, Tdap, and Td Vaccines (2 - Td or Tdap) 06/27/2022    HIV  "Screening  05/22/2050 (Originally 5/27/1979)    Depression Screening  06/21/2023    Breast Cancer Screening  09/26/2024    Colorectal Cancer Screening  03/31/2027    Influenza Vaccine  Completed    COVID-19 Vaccine  Completed    Meningococcal ACWY  Aged Out    HIB Vaccines  Aged Out    IPV Vaccines  Aged Out    HPV Vaccines  Aged Out    Hepatitis C Screening  Discontinued         The following have been reviewed and updated as appropriate in this visit:   Tobacco  Allergies  Meds  Problems  Med Hx  Surg Hx  Fam Hx         Review of System  All others negative    Objective     Vitals  Vitals:    11/28/22 1045 11/28/22 1109   BP: 140/82 134/84   BP Location: Right upper arm    Patient Position: Sitting    Pulse: 85    Resp: 17    Temp: 36.6 °C (97.9 °F)    TempSrc: Temporal    SpO2: 98%    Weight: 72.1 kg (159 lb)    Height: 1.651 m (5' 5\")        Body mass index is 26.46 kg/m².    Physical Exam  Physical Exam  Vitals reviewed.   HENT:      Right Ear: Tympanic membrane normal.      Left Ear: Tympanic membrane normal.      Nose: Congestion present.   Cardiovascular:      Rate and Rhythm: Normal rate and regular rhythm.      Heart sounds: No murmur heard.  Pulmonary:      Effort: Pulmonary effort is normal. No respiratory distress.      Breath sounds: Normal breath sounds. No wheezing, rhonchi or rales.         Assessment/Plan     Problem List Items Addressed This Visit        Respiratory    Post-viral cough syndrome - Primary     Encouraged continued use of Symbicort as directed 2 inhalations twice daily.  Can continue albuterol scheduled every 4.  We will hold off on steroids at this point.  If persists can consider.            Endocrine/Metabolic    Multiple thyroid nodules     Reviewed ultrasound.  Does have TI-RADS category 5 nodule.  Will refer to endocrinology for consideration of needle aspiration.  TSH levels are unremarkable.         Relevant Medications    predniSONE (DELTASONE) 20 mg tablet "    Other Relevant Orders    Ambulatory referral to Endocrinology       No follow-ups on file.      Juan C Hartmann, DO  11/28/2022

## 2022-11-29 NOTE — ASSESSMENT & PLAN NOTE
Encouraged continued use of Symbicort as directed 2 inhalations twice daily.  Can continue albuterol scheduled every 4.  We will hold off on steroids at this point.  If persists can consider.

## 2022-11-29 NOTE — ASSESSMENT & PLAN NOTE
Reviewed ultrasound.  Does have TI-RADS category 5 nodule.  Will refer to endocrinology for consideration of needle aspiration.  TSH levels are unremarkable.

## 2022-12-06 ENCOUNTER — TELEPHONE (OUTPATIENT)
Dept: PRIMARY CARE | Facility: CLINIC | Age: 57
End: 2022-12-06
Payer: COMMERCIAL

## 2022-12-06 NOTE — TELEPHONE ENCOUNTER
----- Message from EMMA Miranda sent at 12/3/2022 11:34 AM EST -----  Hi Amber,  Stable US thyroid  Recommend repeat in 1 year for surveillance.    Laura.

## 2022-12-19 RX ORDER — BUDESONIDE AND FORMOTEROL FUMARATE DIHYDRATE 80; 4.5 UG/1; UG/1
AEROSOL RESPIRATORY (INHALATION)
Qty: 10.2 G | Refills: 1 | Status: SHIPPED | OUTPATIENT
Start: 2022-12-19 | End: 2023-11-27

## 2022-12-19 RX ORDER — ALBUTEROL SULFATE 90 UG/1
2 AEROSOL, METERED RESPIRATORY (INHALATION) 4 TIMES DAILY PRN
Qty: 18 G | Refills: 1 | Status: SHIPPED | OUTPATIENT
Start: 2022-12-19 | End: 2022-12-21 | Stop reason: SDUPTHER

## 2022-12-21 ENCOUNTER — OFFICE VISIT (OUTPATIENT)
Dept: PRIMARY CARE | Facility: CLINIC | Age: 57
End: 2022-12-21
Payer: COMMERCIAL

## 2022-12-21 VITALS
RESPIRATION RATE: 18 BRPM | DIASTOLIC BLOOD PRESSURE: 78 MMHG | WEIGHT: 160 LBS | BODY MASS INDEX: 26.66 KG/M2 | OXYGEN SATURATION: 99 % | HEIGHT: 65 IN | HEART RATE: 98 BPM | SYSTOLIC BLOOD PRESSURE: 128 MMHG | TEMPERATURE: 97.9 F

## 2022-12-21 DIAGNOSIS — R91.8 PULMONARY NODULES: ICD-10-CM

## 2022-12-21 DIAGNOSIS — F41.9 ANXIETY: ICD-10-CM

## 2022-12-21 DIAGNOSIS — R73.01 IMPAIRED FASTING GLUCOSE: ICD-10-CM

## 2022-12-21 DIAGNOSIS — E78.00 HYPERCHOLESTEROLEMIA: ICD-10-CM

## 2022-12-21 DIAGNOSIS — I10 BENIGN ESSENTIAL HTN: Primary | ICD-10-CM

## 2022-12-21 DIAGNOSIS — J45.909 MILD ASTHMA, UNSPECIFIED WHETHER COMPLICATED, UNSPECIFIED WHETHER PERSISTENT: ICD-10-CM

## 2022-12-21 DIAGNOSIS — J45.20 MILD INTERMITTENT ASTHMA WITHOUT COMPLICATION: ICD-10-CM

## 2022-12-21 DIAGNOSIS — E04.2 MULTIPLE THYROID NODULES: ICD-10-CM

## 2022-12-21 DIAGNOSIS — E55.9 MILD VITAMIN D DEFICIENCY: ICD-10-CM

## 2022-12-21 PROBLEM — R05.8 POST-VIRAL COUGH SYNDROME: Status: RESOLVED | Noted: 2022-11-28 | Resolved: 2022-12-21

## 2022-12-21 PROCEDURE — 3078F DIAST BP <80 MM HG: CPT | Performed by: NURSE PRACTITIONER

## 2022-12-21 PROCEDURE — 99213 OFFICE O/P EST LOW 20 MIN: CPT | Performed by: NURSE PRACTITIONER

## 2022-12-21 PROCEDURE — 3008F BODY MASS INDEX DOCD: CPT | Performed by: NURSE PRACTITIONER

## 2022-12-21 PROCEDURE — 3074F SYST BP LT 130 MM HG: CPT | Performed by: NURSE PRACTITIONER

## 2022-12-21 RX ORDER — SERTRALINE HYDROCHLORIDE 50 MG/1
50 TABLET, FILM COATED ORAL
Qty: 90 TABLET | Refills: 1 | Status: SHIPPED | OUTPATIENT
Start: 2022-12-21 | End: 2023-06-21 | Stop reason: SDUPTHER

## 2022-12-21 RX ORDER — LOSARTAN POTASSIUM 25 MG/1
25 TABLET ORAL DAILY
Qty: 90 TABLET | Refills: 1 | Status: SHIPPED | OUTPATIENT
Start: 2022-12-21 | End: 2023-06-21 | Stop reason: ALTCHOICE

## 2022-12-21 RX ORDER — ALBUTEROL SULFATE 90 UG/1
2 AEROSOL, METERED RESPIRATORY (INHALATION) 4 TIMES DAILY PRN
Qty: 18 G | Refills: 1 | Status: SHIPPED | OUTPATIENT
Start: 2022-12-21 | End: 2023-12-18 | Stop reason: SDUPTHER

## 2022-12-21 RX ORDER — MONTELUKAST SODIUM 10 MG/1
10 TABLET ORAL
Qty: 90 TABLET | Refills: 1 | Status: SHIPPED | OUTPATIENT
Start: 2022-12-21 | End: 2023-06-21 | Stop reason: SDUPTHER

## 2022-12-21 RX ORDER — ROSUVASTATIN CALCIUM 5 MG/1
5 TABLET, COATED ORAL DAILY
COMMUNITY
Start: 2022-12-13

## 2022-12-21 ASSESSMENT — ENCOUNTER SYMPTOMS
MYALGIAS: 0
PALPITATIONS: 0
SHORTNESS OF BREATH: 0
INSOMNIA: 0
HYPERTENSION: 1
NERVOUS/ANXIOUS: 1

## 2022-12-21 ASSESSMENT — PAIN SCALES - GENERAL: PAINLEVEL: 0-NO PAIN

## 2022-12-21 ASSESSMENT — PATIENT HEALTH QUESTIONNAIRE - PHQ9: SUM OF ALL RESPONSES TO PHQ9 QUESTIONS 1 & 2: 0

## 2022-12-21 NOTE — ASSESSMENT & PLAN NOTE
Started on statin months ago.  --not at goal  Will check with Dr Sabillon and see if he wants her to increase to 10mg Rosuvastatin.

## 2022-12-21 NOTE — PROGRESS NOTES
[facility] at [address]  [place] [address]  [phone]  Fax [phone]      Patient ID: Madonna Tipton                              : 1965    Visit Date: 2022    Chief Complaint: Blood Pressure Check and Follow-up (Discuss lab results /)         Patient ID: Madonna Tipton is a 57 y.o. female.    Patient Active Problem List   Diagnosis   • At high risk for breast cancer   • History of hysterectomy for indication other than cancer   • Mild asthma   • Mild vitamin D deficiency   • Well woman exam with routine gynecological exam   • Cyst of left ovary   • Anxiety   • Tachycardia   • Urinary retention   • Family history of malignant neoplasm of breast   • Encounter for gynecological examination without abnormal finding   • Impaired fasting glucose   • Hypercholesterolemia   • Former smoker   • Benign essential HTN   • Pulmonary nodules   • Multiple thyroid nodules         Current Outpatient Medications:   •  budesonide-formoteroL (SYMBICORT) 80-4.5 mcg/actuation inhaler, INHALE 2 PUFFS INTO THE LUNGS TWICE A DAY RINSE MOUTH WITH WATER AFTER USE, DO NOT SWALLOW. Strength: 80-4.5 mcg/actuation, Disp: 10.2 g, Rfl: 1  •  cholecalciferol, vitamin D3, 1,000 unit (25 mcg) tablet, Take 1,000 Units by mouth daily., Disp: , Rfl:   •  hydrocortisone-iodoquinoL (VYTONE) 1-1 % cream, Apply topically 3 (three) times a day as needed (for itching)., Disp: 45 g, Rfl: 0  •  losartan (COZAAR) 25 mg tablet, Take 1 tablet (25 mg total) by mouth daily., Disp: 90 tablet, Rfl: 1  •  lycopene 10 mg capsule, Take by mouth., Disp: , Rfl:   •  montelukast (SINGULAIR) 10 mg tablet, Take 1 tablet (10 mg total) by mouth once daily., Disp: 90 tablet, Rfl: 1  •  PROAIR HFA 90 mcg/actuation inhaler, Inhale 2 puffs 4 (four) times a day as needed for shortness of breath., Disp: 18 g, Rfl: 1  •  rosuvastatin (CRESTOR) 5 mg tablet, Take 5 mg by mouth daily., Disp:  , Rfl:   •  sertraline (ZOLOFT) 50 mg tablet, Take 1 tablet (50 mg total) by mouth once daily., Disp: 90 tablet, Rfl: 1    Allergies   Allergen Reactions   • Feathers Shortness of breath   • Mold Shortness of breath   • Shellfish Derived GI intolerance       Social History     Tobacco Use   • Smoking status: Former     Types: Cigarettes   • Smokeless tobacco: Never   Substance Use Topics   • Alcohol use: Yes     Alcohol/week: 7.0 standard drinks     Types: 7 Glasses of wine per week     Comment: social   • Drug use: No       Health Maintenance   Topic Date Due   • Zoster Vaccine (1 of 2) Never done   • DTaP, Tdap, and Td Vaccines (2 - Td or Tdap) 06/27/2022   • Pneumococcal (1 - PCV) 12/21/2023 (Originally 5/27/1972)   • HIV Screening  05/22/2050 (Originally 5/27/1979)   • Depression Screening  12/21/2023   • Breast Cancer Screening  09/26/2024   • Colorectal Cancer Screening  03/31/2027   • Influenza Vaccine  Completed   • COVID-19 Vaccine  Completed   • Meningococcal ACWY  Aged Out   • HIB Vaccines  Aged Out   • IPV Vaccines  Aged Out   • HPV Vaccines  Aged Out   • Hepatitis B Vaccines  Discontinued   • Hepatitis C Screening  Discontinued       HPI  Routine follow up    Hypertension  This is a chronic problem. The current episode started more than 1 year ago. The problem is unchanged. The problem is controlled. Associated symptoms include anxiety. Pertinent negatives include no chest pain, palpitations, peripheral edema or shortness of breath. There are no associated agents to hypertension. Past treatments include angiotensin blockers. The current treatment provides significant improvement. There are no compliance problems.  There is no history of chronic renal disease.   Hyperlipidemia  This is a chronic problem. The current episode started more than 1 year ago. Lipid results: borderline high. She has no history of chronic renal disease, diabetes, hypothyroidism, liver disease, obesity or nephrotic syndrome.  "There are no known factors aggravating her hyperlipidemia. Pertinent negatives include no chest pain, myalgias or shortness of breath. Current antihyperlipidemic treatment includes statins. The current treatment provides significant improvement of lipids. There are no compliance problems.    Anxiety  Presents for follow-up visit. Symptoms include excessive worry and nervous/anxious behavior. Patient reports no chest pain, insomnia, palpitations, shortness of breath or suicidal ideas. Symptoms occur occasionally. The severity of symptoms is mild.     Compliance with medications is % (on Sertraline). Treatment side effects: none.       The following have been reviewed and updated as appropriate in this visit:   Allergies  Meds  Problems         Review of System  Review of Systems   Respiratory: Negative for shortness of breath.    Cardiovascular: Negative for chest pain and palpitations.   Musculoskeletal: Negative for myalgias.   Psychiatric/Behavioral: Negative for suicidal ideas. The patient is nervous/anxious. The patient does not have insomnia.        Objective     Vitals  Vitals:    12/21/22 0814   BP: 128/78   BP Location: Left upper arm   Patient Position: Sitting   Pulse: 98   Resp: 18   Temp: 36.6 °C (97.9 °F)   TempSrc: Temporal   SpO2: 99%   Weight: 72.6 kg (160 lb)   Height: 1.651 m (5' 5\")     Body mass index is 26.63 kg/m².      Physical Exam  Vitals reviewed.   Constitutional:       General: She is not in acute distress.     Appearance: Normal appearance. She is not diaphoretic.   Cardiovascular:      Rate and Rhythm: Normal rate and regular rhythm.      Heart sounds: No murmur heard.    No friction rub. No gallop.   Pulmonary:      Effort: Pulmonary effort is normal.      Breath sounds: Normal breath sounds. No wheezing, rhonchi or rales.   Musculoskeletal:      Right lower leg: No edema.      Left lower leg: No edema.   Neurological:      Mental Status: She is alert and oriented to person, " place, and time.   Psychiatric:         Mood and Affect: Mood and affect normal.         Speech: Speech normal.         Behavior: Behavior normal.         Thought Content: Thought content does not include suicidal ideation. Thought content does not include suicidal plan.         Assessment/Plan     Problem List Items Addressed This Visit     Mild asthma     Recent bronchitis  Better now.         Relevant Medications    PROAIR HFA 90 mcg/actuation inhaler    montelukast (SINGULAIR) 10 mg tablet    Mild vitamin D deficiency     D3 daily 1000IU--takes regularly.         Anxiety     Stable on Sertraline daily  Continue med  Follow up 6 mo.         Relevant Medications    sertraline (ZOLOFT) 50 mg tablet    Impaired fasting glucose     Recent glucose 102.  Continue to monitor.         Hypercholesterolemia     Started on statin months ago.  --not at goal  Will check with Dr Sabillon and see if he wants her to increase to 10mg Rosuvastatin.         Relevant Medications    rosuvastatin (CRESTOR) 5 mg tablet    Benign essential HTN - Primary     BP stable  Continue Losartan daily.  Follow up 6 months         Relevant Medications    losartan (COZAAR) 25 mg tablet    Pulmonary nodules     Plans to follow up in 6 months again.         Multiple thyroid nodules     Biopsy upcoming in January.                EMMA Miranda  12/21/2022

## 2023-02-01 ENCOUNTER — OFFICE VISIT (OUTPATIENT)
Dept: PRIMARY CARE | Facility: CLINIC | Age: 58
End: 2023-02-01
Payer: COMMERCIAL

## 2023-02-01 ENCOUNTER — TELEPHONE (OUTPATIENT)
Dept: GENETICS | Facility: HOSPITAL | Age: 58
End: 2023-02-01
Payer: COMMERCIAL

## 2023-02-01 VITALS
DIASTOLIC BLOOD PRESSURE: 80 MMHG | TEMPERATURE: 98 F | OXYGEN SATURATION: 99 % | BODY MASS INDEX: 27.46 KG/M2 | HEART RATE: 92 BPM | RESPIRATION RATE: 18 BRPM | HEIGHT: 65 IN | WEIGHT: 164.8 LBS | SYSTOLIC BLOOD PRESSURE: 130 MMHG

## 2023-02-01 DIAGNOSIS — E04.2 MULTIPLE THYROID NODULES: Primary | ICD-10-CM

## 2023-02-01 DIAGNOSIS — J45.20 MILD INTERMITTENT ASTHMA WITHOUT COMPLICATION: ICD-10-CM

## 2023-02-01 PROCEDURE — 3079F DIAST BP 80-89 MM HG: CPT | Performed by: NURSE PRACTITIONER

## 2023-02-01 PROCEDURE — 99212 OFFICE O/P EST SF 10 MIN: CPT | Performed by: NURSE PRACTITIONER

## 2023-02-01 PROCEDURE — 3075F SYST BP GE 130 - 139MM HG: CPT | Performed by: NURSE PRACTITIONER

## 2023-02-01 PROCEDURE — 3008F BODY MASS INDEX DOCD: CPT | Performed by: NURSE PRACTITIONER

## 2023-02-01 ASSESSMENT — ENCOUNTER SYMPTOMS
CONSTITUTIONAL NEGATIVE: 1
RESPIRATORY NEGATIVE: 1
CARDIOVASCULAR NEGATIVE: 1

## 2023-02-01 ASSESSMENT — PAIN SCALES - GENERAL: PAINLEVEL: 0-NO PAIN

## 2023-02-01 ASSESSMENT — PATIENT HEALTH QUESTIONNAIRE - PHQ9: SUM OF ALL RESPONSES TO PHQ9 QUESTIONS 1 & 2: 0

## 2023-02-01 NOTE — TELEPHONE ENCOUNTER
I left a message for patient to remind them of their genetic counseling appointment tomorrow. I provided directions and gave the number to my direct line.

## 2023-02-01 NOTE — ASSESSMENT & PLAN NOTE
Discussion regarding recent thyroid results and gene testing results, surgery, management of thyroid after surgery.  Biopsy recently + for follicular cancer  Refer to Dr Gunner Rand @ [place] for surgery.

## 2023-02-01 NOTE — PROGRESS NOTES
[facility] at [address]  [place] [address]  [phone]  Fax [phone]      Patient ID: Madonna Tipton                              : 1965    Visit Date: 2023    Chief Complaint: Follow-up (Wants to discuss biopsy results )         Patient ID: Madonna Tipton is a 57 y.o. female.    Patient Active Problem List   Diagnosis   • At high risk for breast cancer   • History of hysterectomy for indication other than cancer   • Mild asthma   • Mild vitamin D deficiency   • Well woman exam with routine gynecological exam   • Cyst of left ovary   • Anxiety   • Tachycardia   • Urinary retention   • Family history of malignant neoplasm of breast   • Encounter for gynecological examination without abnormal finding   • Impaired fasting glucose   • Hypercholesterolemia   • Former smoker   • Benign essential HTN   • Pulmonary nodules   • Multiple thyroid nodules         Current Outpatient Medications:   •  budesonide-formoteroL (SYMBICORT) 80-4.5 mcg/actuation inhaler, INHALE 2 PUFFS INTO THE LUNGS TWICE A DAY RINSE MOUTH WITH WATER AFTER USE, DO NOT SWALLOW. Strength: 80-4.5 mcg/actuation, Disp: 10.2 g, Rfl: 1  •  cholecalciferol, vitamin D3, 1,000 unit (25 mcg) tablet, Take 1,000 Units by mouth daily., Disp: , Rfl:   •  hydrocortisone-iodoquinoL (VYTONE) 1-1 % cream, Apply topically 3 (three) times a day as needed (for itching)., Disp: 45 g, Rfl: 0  •  losartan (COZAAR) 25 mg tablet, Take 1 tablet (25 mg total) by mouth daily., Disp: 90 tablet, Rfl: 1  •  lycopene 10 mg capsule, Take by mouth., Disp: , Rfl:   •  montelukast (SINGULAIR) 10 mg tablet, Take 1 tablet (10 mg total) by mouth once daily., Disp: 90 tablet, Rfl: 1  •  PROAIR HFA 90 mcg/actuation inhaler, Inhale 2 puffs 4 (four) times a day as needed for shortness of breath., Disp: 18 g, Rfl: 1  •  rosuvastatin (CRESTOR) 5 mg tablet, Take 5 mg by mouth daily., Disp: , Rfl:   •   sertraline (ZOLOFT) 50 mg tablet, Take 1 tablet (50 mg total) by mouth once daily., Disp: 90 tablet, Rfl: 1    Allergies   Allergen Reactions   • Feathers Shortness of breath   • Mold Shortness of breath   • Shellfish Derived GI intolerance       Social History     Tobacco Use   • Smoking status: Former     Types: Cigarettes   • Smokeless tobacco: Never   Substance Use Topics   • Alcohol use: Yes     Alcohol/week: 7.0 standard drinks     Types: 7 Glasses of wine per week     Comment: social   • Drug use: No       Health Maintenance   Topic Date Due   • Zoster Vaccine (1 of 2) Never done   • DTaP, Tdap, and Td Vaccines (2 - Td or Tdap) 06/27/2022   • Pneumococcal (1 - PCV) 12/21/2023 (Originally 5/27/1972)   • HIV Screening  05/22/2050 (Originally 5/27/1979)   • Depression Screening  02/01/2024   • Breast Cancer Screening  09/26/2024   • Colorectal Cancer Screening  03/31/2027   • Influenza Vaccine  Completed   • COVID-19 Vaccine  Completed   • Meningococcal ACWY  Aged Out   • HIB Vaccines  Aged Out   • IPV Vaccines  Aged Out   • HPV Vaccines  Aged Out   • Hepatitis B Vaccines  Discontinued   • Hepatitis C Screening  Discontinued       HPI  Discussion of recent thyroid biopsy results  Recently dx with thyroid cancer and would like opinion on next steps  Surgery is recommended  Follicular neoplasm on biopsy results with additional gene testing with increased likelihood of cancer.  Saw Dr Heller.  Asking about management of hypothyroidism once the thyroid gland is removed.      The following have been reviewed and updated as appropriate in this visit:   Allergies  Meds  Problems         Review of System  Review of Systems   Constitutional: Negative.    Respiratory: Negative.    Cardiovascular: Negative.        Objective     Vitals  Vitals:    02/01/23 1434   BP: 130/80   BP Location: Left upper arm   Patient Position: Sitting   Pulse: 92   Resp: 18   Temp: 36.7 °C (98 °F)   TempSrc: Temporal   SpO2: 99%   Weight:  "74.8 kg (164 lb 12.8 oz)   Height: 1.651 m (5' 5\")     Body mass index is 27.42 kg/m².     Physical Exam  Vitals reviewed.   Constitutional:       General: She is not in acute distress.     Appearance: Normal appearance. She is not ill-appearing, toxic-appearing or diaphoretic.   Cardiovascular:      Rate and Rhythm: Normal rate and regular rhythm.   Pulmonary:      Effort: Pulmonary effort is normal. No respiratory distress.   Neurological:      Mental Status: She is alert and oriented to person, place, and time.         Assessment/Plan     Problem List Items Addressed This Visit     Mild asthma     Stable on one puff of Symbicort daily  Keeps her from getting bronchitis in winter months.         Multiple thyroid nodules - Primary     Discussion regarding recent thyroid results and gene testing results, surgery, management of thyroid after surgery.  Biopsy recently + for follicular cancer  Refer to Dr Gunner Rand @ [place] for surgery.                EMMA Miranda  2/1/2023  " Significant other

## 2023-02-02 ENCOUNTER — TELEMEDICINE (OUTPATIENT)
Dept: GENETICS | Facility: HOSPITAL | Age: 58
End: 2023-02-02
Attending: OBSTETRICS & GYNECOLOGY
Payer: COMMERCIAL

## 2023-02-02 DIAGNOSIS — Z80.3 FHX: BREAST CANCER: ICD-10-CM

## 2023-02-02 DIAGNOSIS — Z80.3 FAMILY HISTORY OF BREAST CANCER: ICD-10-CM

## 2023-02-02 DIAGNOSIS — Z71.83 ENCOUNTER FOR NONPROCREATIVE GENETIC COUNSELING: Primary | ICD-10-CM

## 2023-02-02 PROCEDURE — 96040 HC GENETICS COUNSELING SESSIONS-TELEHEALTH: CPT | Performed by: GENETIC COUNSELOR, MS

## 2023-02-02 NOTE — LETTER
02/03/23    Amber Tipton  [address]  [facility] [address]    Dear Ms. Tipton,    Thank you for participating in the [facility].  It was a pleasure working with you. Attached is documentation from our discussion(s). It will also be sent to any physicians you indicated.      You may also choose to share this documentation with your family members. Because cancer risk is based on both personal and both maternal and paternal family history factors, as well as mutation status, your relatives are recommended to review their risks and genetic testing options (if applicable) with their own healthcare providers to derive a risk-appropriate, individualized plan.      If you have any questions, concerns, or updates to your personal/family history, please contact the [facility] Risk Assessment and Genetics Program at [phone] to further review your case.    Please see below for your encounter report.    Sincerely,         Kirby Duenas, [facility]        Encounter Note    Telemedicine Consent:  Prior to commencing the session, Amber Tipton provided verbal consent to have genetic counseling via telemedicine using [employer] Visit (Epic Video Client), which is a telemedicine platform being utilized.  Amber understands the session will be billed to insurance or to them directly if uninsured or if not covered by insurance. Amber was informed that the clinician is the only provider on?the video conference, sessions are not recorded by the clinician, and the patient is not permitted to record the session.? Amber was provided a unique meeting ID prior to session. The clinician confirmed identification of patient by name and birthdate, confirmed patient location, support person(s) present, and obtained a callback number in case disconnected.     Indication for Appointment:  Amber Tipton presented for genetic counseling and cancer risk assessment via a telemedicine encounter due to a  personal history of thyroid cancer and family history of breast and rectal cancer. Amber was referred by Ciro Monroe MD and presented to the session alone.    Personal History:   Amber is 57 y.o. female of  descent with primary visit diagnosis of Encounter for nonprocreative genetic counseling [Z71.83].    Amber reports that she underwent routine screening for lung cancer in the summer of 2022 due to her previous smoking history, which identified lung nodules. Follow up 6 months later incidentally identified thyroid nodules. Ultrasound of the thyroid, performed on 11/17/2023 confirmed the multiple nodules identified. Amber underwent a fine needle aspiration on 1/5/2023 which favored follicular neoplasm. 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 Treatment planning is in process.    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   • Urinary retention with incomplete bladder emptying       Past Medical History Pertinent Negatives:   Denies History Of: Date Noted   • Arthritis 09/08/2021   • Colon polyp 02/02/2023   • Hx of lipoma 02/02/2023   • Melanoma (CMS/HCC) 02/02/2023     Past Surgical History:   Procedure Laterality Date   • Breast biopsy      x2   • Colonoscopy  2016   • Dental surgery     • Hernia repair  12/2016   • Laparoscopic total hysterectomy N/A 06/28/2018    [facility], soren salpingectomy,  "cystoscopy     Past Surgical History Pertinent Negatives:   Denies History Of: Date Noted   • Oophorectomy 2023        Height/Weight: (previously recorded in physician's office)  Height: 1.651 m (5' 5\")  Weight: 74.8 kg (164 lb 12.8 oz)    Gynecologic History:  Menarche Age: 14 years  Age at first live birth: 29  Menopause Status: Post-Menopause  Age at menopause: 55  Use of hormonal contraceptives: Yes (6yrs)  Use of fertility medications: Yes (IVF x7)  Use of hormone replacement therapy: No  Use of Tamoxifen/Evista: No    Social History     Tobacco Use   • Smoking status: Former     Packs/day: 2.50     Years: 18.00     Pack years: 45.00     Types: Cigarettes     Quit date:      Years since quittin.1   • Smokeless tobacco: Never   Substance Use Topics   • Alcohol use: Not Currently     Alcohol/week: 14.0 standard drinks     Types: 14 Standard drinks or equivalent per week     Comment: social   • Drug use: No       Family History:  See completed family history in pedigree below.    Genetic Education/Risk Assessment/Counseling:  Information was provided about the relationship between genes and cancer.  The concept of hereditary cancer was defined.  Natural history, risks and inheritance patterns of  cancer-associated genes were reviewed, as related to Amber’s personal and/or family history.  Related psychosocial aspects were discussed.    Discussion of Genetic Testing:  The pros, cons, and limitations of testing for genetic susceptibility were discussed, including but not limited to test options, possible results, potential impact on management, and psychosocial aspects.  There may be limited data on the degree of cancer risk and/or no defined management guidelines associated with some genes.  If applicable, risk assessment models and/or published tables were used to provide a mutation probability estimate. Limitations of assessment were reviewed.    Given the reported personal and/or family history, " genetic testing was offered; the patient is undecided at this time.    Risk Assessment and Management:    In the absence of genetic testing, the cancer risks and guidelines reviewed are based on the personal and/or family history provided. As guidelines continually change and the efficacy of screening for some cancers remains under investigation, Amber is encouraged to review personal and family history with managing physician(s) regularly.  Site of Surveillence Coordinating Provider Recommendation Status   Breast Primary Care or Gynecology · Lifetime risk of developing breast cancer was calculated using the Tyrer-Cuzick model and is estimated to be 21.8%.  · Breast cancer risk is dynamic and can increase with age and other factors. A high lifetime risk for developing breast cancer is typically 20-25% or higher.  National Comprehensive Cancer Network (NCCN) guidelines for breast cancer screening include: monthly self-breast examinations, clinical breast examination performed by a physician every 6-12 months and annual mammogram to begin 10 years prior to the youngest breast cancer diagnosis in the family, but not less than age 30. The NCCN also suggests screening with breast MRI as an adjunct to mammogram in the high risk setting, with breast MRI screening beginning 10 years prior to the youngest breast cancer diagnosis in the family, but not less than age 25. As the lifetime risk for breast cancer exceeds 20%, screening, as well as the risks and benefits of risk reduction strategies should be discussed with managing physician(s).      Gynecologic Gynecology · Pelvic exam and pap test annually or as directed by physician.       Gastrointestinal PCP  Gastroenterology · Amber Tipton is advised to continue with colorectal cancer screening as directed by physician, or sooner if symptoms or changes in history warrant sooner evaluation.   · Based on the reported family history of colorectal cancer, Amber’s lifetime risk of  developing colorectal cancer is increased 1.5 fold over that of the general population.  General population risk is approximately 5-6%; thus, Amber’s risk is 7.5-9%. The patient was encouraged to review this history and risk with managing gastroenterologist to determine frequency of colonoscopy.      Skin PCP  Dermatology · Amber is encouraged to practice skin protective behaviors, such as:  · Limit direct sun exposure  · Use sunscreen  · Pursue annual skin screening by a physician        General Management PCP · Annual physical examination is encouraged.    · Adherence to a healthy lifestyle, including body mass index (BMI) <25 obtained through balanced diet and exercise,   · Limit intake of alcoholic beverages to less than 1 drink per day (serving equals 1 ounce of liquor, 6 ounces of wine or 8 ounces of beer)  · Continue smoking cessation.  US Preventive Services Task Force (USPSTF) currently recommends annual screening for lung cancer with low dose CT scan in adults aged 50-80 who have a 20+pack year smoking history and currently smoke or have quit within the past 15 years.  As lung cancer screening guidelines are evolving,Amber may contact the managing physician to discuss eligibility for screening.  · Of note, Amber Tipton is encouraged to discuss the potential side effects of the treatment rendered for curative intent of cancer including but not limited to psychosocial and physical effects, second cancer risk, other health concerns.        Plan:  Cancer risks are based on personal history, as well as on maternal and paternal family histories, mutation status, and other factors.  Relatives are encouraged to consider risk assessment and/or genetic evaluation to derive a risk-appropriate, individualized plan.  Should family member(s) be interested in genetic evaluation, the Cancer Risk Assessment and Genetics Program can provide consultation or help to find a genetic counselor in their area. Testing in relatives may  assist in cancer risk assessment.    The information provided reflects current practice guidelines and may change with new medical discoveries/technology/updated personal or family history information.  Amber was confirmed to have understood the aforementioned information and was assisted with decision making as needed.  Informational and supportive resources were provided. Consent was obtained to share chart note(s) with physicians.  Amber plans to discuss the above information with physicians to determine an optimal risk management plan.    Amber should contact the program with personal/family history updates as this could alter the guidelines provided and/or available test options and/or to inquire about new information specific to this case. Amber was encouraged to contact the genetics program at [phone] with any questions or concerns and/or to periodically review test options and related insurance coverage.    A total of 60 minutes was spent providing genetic counseling to Amber.

## 2023-02-02 NOTE — LETTER
02/03/23    Ciro Monroe MD  [address]  [facility] [address]    Re:  Patient Preferred Name: Amber Tipton  Patient Legal Name: Madonna Tipton    Dear Dr. Monroe,    Thank you for referring your patient, Amber Tipton, to receive care through my office. I have enclosed a summary of the care provided to Amber on 02/03/23.    Please contact me with any questions you may have regarding the visit.    Sincerely,             KRISTEN Esquivel    [address] [facility] [address] [phone]    CC: No Recipients

## 2023-02-02 NOTE — PROGRESS NOTES
Patient Name: Amber Tipton  Patient Legal Name: Madonna Tipton  : 1965       Telemedicine Consent:  Prior to commencing the session, Amber Tipton provided verbal consent to have genetic counseling via telemedicine using [employer] Video Visit (Epic Video Client), which is a telemedicine platform being utilized.  Amber understands the session will be billed to insurance or to them directly if uninsured or if not covered by insurance. Amber was informed that the clinician is the only provider on?the video conference, sessions are not recorded by the clinician, and the patient is not permitted to record the session.? Amber was provided a unique meeting ID prior to session. The clinician confirmed identification of patient by name and birthdate, confirmed patient location, support person(s) present, and obtained a callback number in case disconnected.     Indication for Appointment:  Amber Tipton presented for genetic counseling and cancer risk assessment via a telemedicine encounter due to a personal history of thyroid cancer and family history of breast and rectal cancer. Amber was referred by Ciro Monroe MD and presented to the session alone.    Personal History:   Amber is 57 y.o. female of  descent with primary visit diagnosis of Encounter for nonprocreative genetic counseling [Z71.83].    Amber reports that she underwent routine screening for lung cancer in the summer of  due to her previous smoking history, which identified lung nodules. Follow up 6 months later incidentally identified thyroid nodules. Ultrasound of the thyroid, performed on 2023 confirmed the multiple nodules identified. Amber underwent a fine needle aspiration on 2023 which favored follicular neoplasm. 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 Treatment planning is in process.    Past Medical  "History:   Diagnosis Date   • Abnormal Pap smear of cervix    • Anxiety    • Asthma    • Body mass index (BMI) greater than 99th percentile for age in overweight child    • Chlamydia    • Fibrocystic breast    • Fibroid    • H/O mammogram 2017   • Hypertension    • Lipid disorder    • Lung nodules    • Palpitations    • Personal history of urinary disorder    • Screening for breast cancer     annual mammogram and breast MRI   • Sensation of pressure in bladder area    • Thyroid cancer (CMS/HCC)     follicular carcinoma   • Urinary retention with incomplete bladder emptying       Past Medical History Pertinent Negatives:   Denies History Of: Date Noted   • Arthritis 2021   • Colon polyp 2023   • Hx of lipoma 2023   • Melanoma (CMS/HCC) 2023     Past Surgical History:   Procedure Laterality Date   • Breast biopsy      x2   • Colonoscopy     • Dental surgery     • Hernia repair  2016   • Laparoscopic total hysterectomy N/A 2018    [facility], soren salpingectomy, cystoscopy     Past Surgical History Pertinent Negatives:   Denies History Of: Date Noted   • Oophorectomy 2023        Height/Weight: (previously recorded in physician's office)  Height: 1.651 m (5' 5\")  Weight: 74.8 kg (164 lb 12.8 oz)    Gynecologic History:  Menarche Age: 14 years  Age at first live birth: 29  Menopause Status: Post-Menopause  Age at menopause: 55  Use of hormonal contraceptives: Yes (6yrs)  Use of fertility medications: Yes (IVF x7)  Use of hormone replacement therapy: No  Use of Tamoxifen/Evista: No    Social History     Tobacco Use   • Smoking status: Former     Packs/day: 2.50     Years: 18.00     Pack years: 45.00     Types: Cigarettes     Quit date:      Years since quittin.1   • Smokeless tobacco: Never   Substance Use Topics   • Alcohol use: Not Currently     Alcohol/week: 14.0 standard drinks     Types: 14 Standard drinks or equivalent per week     Comment: social   • Drug use: No "       Family History:  See completed family history in pedigree below.    Genetic Education/Risk Assessment/Counseling:  Information was provided about the relationship between genes and cancer.  The concept of hereditary cancer was defined.  Natural history, risks and inheritance patterns of  cancer-associated genes were reviewed, as related to Amber’s personal and/or family history.  Related psychosocial aspects were discussed.    Discussion of Genetic Testing:  The pros, cons, and limitations of testing for genetic susceptibility were discussed, including but not limited to test options, possible results, potential impact on management, and psychosocial aspects.  There may be limited data on the degree of cancer risk and/or no defined management guidelines associated with some genes.  If applicable, risk assessment models and/or published tables were used to provide a mutation probability estimate. Limitations of assessment were reviewed.    Given the reported personal and/or family history, genetic testing was offered; the patient is undecided at this time.    Risk Assessment and Management:     In the absence of genetic testing, the cancer risks and guidelines reviewed are based on the personal and/or family history provided. As guidelines continually change and the efficacy of screening for some cancers remains under investigation, Amber is encouraged to review personal and family history with managing physician(s) regularly.  Site of Surveillence Coordinating Provider Recommendation Status   Breast Primary Care or Gynecology · Lifetime risk of developing breast cancer was calculated using the Tyrer-Cuzick model and is estimated to be 21.8%.  · Breast cancer risk is dynamic and can increase with age and other factors. A high lifetime risk for developing breast cancer is typically 20-25% or higher.  National Comprehensive Cancer Network (NCCN) guidelines for breast cancer screening include: monthly self-breast  examinations, clinical breast examination performed by a physician every 6-12 months and annual mammogram to begin 10 years prior to the youngest breast cancer diagnosis in the family, but not less than age 30. The NCCN also suggests screening with breast MRI as an adjunct to mammogram in the high risk setting, with breast MRI screening beginning 10 years prior to the youngest breast cancer diagnosis in the family, but not less than age 25. As the lifetime risk for breast cancer exceeds 20%, screening, as well as the risks and benefits of risk reduction strategies should be discussed with managing physician(s).      Gynecologic Gynecology · Pelvic exam and pap test annually or as directed by physician.       Gastrointestinal PCP  Gastroenterology · Amber Tipton is advised to continue with colorectal cancer screening as directed by physician, or sooner if symptoms or changes in history warrant sooner evaluation.   · Based on the reported family history of colorectal cancer, Amber’s lifetime risk of developing colorectal cancer is increased 1.5 fold over that of the general population.  General population risk is approximately 5-6%; thus, Amber’s risk is 7.5-9%. The patient was encouraged to review this history and risk with managing gastroenterologist to determine frequency of colonoscopy.      Skin PCP  Dermatology · Amber is encouraged to practice skin protective behaviors, such as:  · Limit direct sun exposure  · Use sunscreen  · Pursue annual skin screening by a physician         General Management PCP · Annual physical examination is encouraged.    · Adherence to a healthy lifestyle, including body mass index (BMI) <25 obtained through balanced diet and exercise,   · Limit intake of alcoholic beverages to less than 1 drink per day (serving equals 1 ounce of liquor, 6 ounces of wine or 8 ounces of beer)  · Continue smoking cessation.  US Preventive Services Task Force (USPSTF) currently recommends annual screening for  lung cancer with low dose CT scan in adults aged 50-80 who have a 20+pack year smoking history and currently smoke or have quit within the past 15 years.  As lung cancer screening guidelines are evolving,Amber may contact the managing physician to discuss eligibility for screening.  · Of note, Amber Tipton is encouraged to discuss the potential side effects of the treatment rendered for curative intent of cancer including but not limited to psychosocial and physical effects, second cancer risk, other health concerns.        Plan:  Cancer risks are based on personal history, as well as on maternal and paternal family histories, mutation status, and other factors.  Relatives are encouraged to consider risk assessment and/or genetic evaluation to derive a risk-appropriate, individualized plan.  Should family member(s) be interested in genetic evaluation, the Cancer Risk Assessment and Genetics Program can provide consultation or help to find a genetic counselor in their area. Testing in relatives may assist in cancer risk assessment.    The information provided reflects current practice guidelines and may change with new medical discoveries/technology/updated personal or family history information.  Amber was confirmed to have understood the aforementioned information and was assisted with decision making as needed.  Informational and supportive resources were provided. Consent was obtained to share chart note(s) with physicians.  Amber plans to discuss the above information with physicians to determine an optimal risk management plan.    Amber should contact the program with personal/family history updates as this could alter the guidelines provided and/or available test options and/or to inquire about new information specific to this case. Amber was encouraged to contact the genetics program at [phone] with any questions or concerns and/or to periodically review test options and related insurance coverage.    A  total of 60 minutes was spent providing genetic counseling to Amber.

## 2023-06-21 ENCOUNTER — OFFICE VISIT (OUTPATIENT)
Dept: PRIMARY CARE | Facility: CLINIC | Age: 58
End: 2023-06-21
Payer: COMMERCIAL

## 2023-06-21 VITALS
HEIGHT: 65 IN | WEIGHT: 168 LBS | SYSTOLIC BLOOD PRESSURE: 122 MMHG | DIASTOLIC BLOOD PRESSURE: 88 MMHG | HEART RATE: 84 BPM | OXYGEN SATURATION: 99 % | TEMPERATURE: 97.7 F | RESPIRATION RATE: 18 BRPM | BODY MASS INDEX: 27.99 KG/M2

## 2023-06-21 DIAGNOSIS — J45.20 MILD INTERMITTENT ASTHMA WITHOUT COMPLICATION: ICD-10-CM

## 2023-06-21 DIAGNOSIS — R91.8 LUNG NODULES: ICD-10-CM

## 2023-06-21 DIAGNOSIS — E78.00 HYPERCHOLESTEROLEMIA: ICD-10-CM

## 2023-06-21 DIAGNOSIS — Z23 NEED FOR VACCINATION: ICD-10-CM

## 2023-06-21 DIAGNOSIS — F41.9 ANXIETY: ICD-10-CM

## 2023-06-21 DIAGNOSIS — J45.909 MILD ASTHMA, UNSPECIFIED WHETHER COMPLICATED, UNSPECIFIED WHETHER PERSISTENT: ICD-10-CM

## 2023-06-21 DIAGNOSIS — R73.01 IMPAIRED FASTING GLUCOSE: ICD-10-CM

## 2023-06-21 DIAGNOSIS — D49.7 FOLLICULAR NEOPLASM OF THYROID: ICD-10-CM

## 2023-06-21 DIAGNOSIS — I10 BENIGN ESSENTIAL HTN: Primary | ICD-10-CM

## 2023-06-21 PROBLEM — E04.2 MULTIPLE THYROID NODULES: Status: RESOLVED | Noted: 2022-11-28 | Resolved: 2023-06-21

## 2023-06-21 PROBLEM — Z01.419 WELL WOMAN EXAM WITH ROUTINE GYNECOLOGICAL EXAM: Status: RESOLVED | Noted: 2019-03-06 | Resolved: 2023-06-21

## 2023-06-21 PROBLEM — R33.9 URINARY RETENTION: Status: RESOLVED | Noted: 2019-08-28 | Resolved: 2023-06-21

## 2023-06-21 PROBLEM — R00.0 TACHYCARDIA: Status: RESOLVED | Noted: 2019-08-28 | Resolved: 2023-06-21

## 2023-06-21 PROCEDURE — 3074F SYST BP LT 130 MM HG: CPT | Performed by: NURSE PRACTITIONER

## 2023-06-21 PROCEDURE — 90750 HZV VACC RECOMBINANT IM: CPT | Performed by: NURSE PRACTITIONER

## 2023-06-21 PROCEDURE — 3008F BODY MASS INDEX DOCD: CPT | Performed by: NURSE PRACTITIONER

## 2023-06-21 PROCEDURE — 3079F DIAST BP 80-89 MM HG: CPT | Performed by: NURSE PRACTITIONER

## 2023-06-21 PROCEDURE — 90471 IMMUNIZATION ADMIN: CPT | Performed by: NURSE PRACTITIONER

## 2023-06-21 PROCEDURE — 99213 OFFICE O/P EST LOW 20 MIN: CPT | Mod: 25 | Performed by: NURSE PRACTITIONER

## 2023-06-21 RX ORDER — LOSARTAN POTASSIUM 50 MG/1
50 TABLET ORAL DAILY
COMMUNITY

## 2023-06-21 RX ORDER — LEVOTHYROXINE SODIUM 125 UG/1
137 TABLET ORAL
COMMUNITY
Start: 2023-06-15

## 2023-06-21 RX ORDER — MONTELUKAST SODIUM 10 MG/1
10 TABLET ORAL
Qty: 90 TABLET | Refills: 1 | Status: SHIPPED | OUTPATIENT
Start: 2023-06-21 | End: 2023-12-20 | Stop reason: SDUPTHER

## 2023-06-21 RX ORDER — SERTRALINE HYDROCHLORIDE 50 MG/1
50 TABLET, FILM COATED ORAL
Qty: 90 TABLET | Refills: 1 | Status: SHIPPED | OUTPATIENT
Start: 2023-06-21 | End: 2023-12-20 | Stop reason: SDUPTHER

## 2023-06-21 ASSESSMENT — ENCOUNTER SYMPTOMS
HYPERTENSION: 1
SHORTNESS OF BREATH: 0
DRY SKIN: 0
WEIGHT GAIN: 0
PALPITATIONS: 0
MYALGIAS: 0
WEIGHT LOSS: 0
HOARSE VOICE: 0
FATIGUE: 0

## 2023-06-21 ASSESSMENT — PAIN SCALES - GENERAL: PAINLEVEL: 0-NO PAIN

## 2023-06-21 NOTE — PROGRESS NOTES
[facility] at [address]  [place] [address]  [phone]  Fax [phone]      Patient ID: Madonna Tipton                              : 1965    Visit Date: 2023    Chief Complaint: Thyroid Problem         Patient ID: Madonna Tipton is a 58 y.o. female.    Patient Active Problem List   Diagnosis   • At high risk for breast cancer   • History of hysterectomy for indication other than cancer   • Mild asthma   • Mild vitamin D deficiency   • Cyst of left ovary   • Anxiety   • Family history of malignant neoplasm of breast   • Encounter for gynecological examination without abnormal finding   • Impaired fasting glucose   • Hypercholesterolemia   • Former smoker   • Benign essential HTN   • Lung nodules   • Follicular neoplasm of thyroid         Current Outpatient Medications:   •  budesonide-formoteroL (SYMBICORT) 80-4.5 mcg/actuation inhaler, INHALE 2 PUFFS INTO THE LUNGS TWICE A DAY RINSE MOUTH WITH WATER AFTER USE, DO NOT SWALLOW. Strength: 80-4.5 mcg/actuation, Disp: 10.2 g, Rfl: 1  •  cholecalciferol, vitamin D3, 1,000 unit (25 mcg) tablet, Take 1,000 Units by mouth daily., Disp: , Rfl:   •  hydrocortisone-iodoquinoL (VYTONE) 1-1 % cream, Apply topically 3 (three) times a day as needed (for itching)., Disp: 45 g, Rfl: 0  •  levothyroxine (SYNTHROID) 125 mcg tablet, Take 125 mcg by mouth daily before breakfast., Disp: , Rfl:   •  losartan (COZAAR) 50 mg tablet, Take 50 mg by mouth daily., Disp: , Rfl:   •  lycopene 10 mg capsule, Take by mouth., Disp: , Rfl:   •  montelukast (SINGULAIR) 10 mg tablet, Take 1 tablet (10 mg total) by mouth once daily., Disp: 90 tablet, Rfl: 1  •  PROAIR HFA 90 mcg/actuation inhaler, Inhale 2 puffs 4 (four) times a day as needed for shortness of breath., Disp: 18 g, Rfl: 1  •  rosuvastatin (CRESTOR) 5 mg tablet, Take 5 mg by mouth daily., Disp: , Rfl:   •  sertraline (ZOLOFT) 50 mg tablet, Take 1  tablet (50 mg total) by mouth once daily., Disp: 90 tablet, Rfl: 1    Allergies   Allergen Reactions   • Feathers Shortness of breath   • Mold Shortness of breath   • Shellfish Derived GI intolerance       Social History     Tobacco Use   • Smoking status: Former     Packs/day: 2.50     Years: 18.00     Pack years: 45.00     Types: Cigarettes     Quit date:      Years since quittin.4   • Smokeless tobacco: Never   Substance Use Topics   • Alcohol use: Not Currently     Alcohol/week: 14.0 standard drinks of alcohol     Types: 14 Standard drinks or equivalent per week     Comment: social   • Drug use: No       Health Maintenance   Topic Date Due   • Pneumococcal (1 - PCV) 2023 (Originally 1972)   • HIV Screening  2050 (Originally 1979)   • Zoster Vaccine (2 of 2) 2023   • Depression Screening  2024   • Breast Cancer Screening  2024   • Colorectal Cancer Screening  2027   • DTaP, Tdap, and Td Vaccines (3 - Td or Tdap) 2033   • Influenza Vaccine  Completed   • COVID-19 Vaccine  Completed   • Meningococcal ACWY  Aged Out   • HIB Vaccines  Aged Out   • IPV Vaccines  Aged Out   • HPV Vaccines  Aged Out   • Hepatitis B Vaccines  Discontinued   • Hepatitis C Screening  Discontinued       HPI  Routine med check  Recent thyroid surgery.    Thyroid Problem  Presents for follow-up (postsurgical hypothyroidism) visit. Patient reports no dry skin, fatigue, hoarse voice, leg swelling, palpitations, weight gain or weight loss. The symptoms have been stable. Her past medical history is significant for hyperlipidemia. There is no history of diabetes.   Hypertension  This is a chronic problem. The current episode started more than 1 year ago. The problem is controlled. Pertinent negatives include no chest pain, palpitations, peripheral edema or shortness of breath. There are no associated agents to hypertension. Past treatments include angiotensin blockers. The current  "treatment provides significant improvement. There are no compliance problems.  Identifiable causes of hypertension include a thyroid problem. There is no history of chronic renal disease.   Hyperlipidemia  This is a chronic problem. The current episode started more than 1 year ago. The problem is controlled. Recent lipid tests were reviewed and are normal. Exacerbating diseases include hypothyroidism. She has no history of chronic renal disease, diabetes, liver disease or obesity. Pertinent negatives include no chest pain, myalgias or shortness of breath. Current antihyperlipidemic treatment includes statins. The current treatment provides significant improvement of lipids. There are no compliance problems.        The following have been reviewed and updated as appropriate in this visit:          Review of System  Review of Systems   Constitutional: Negative for fatigue, weight gain and weight loss.   HENT: Negative for hoarse voice.    Respiratory: Negative for shortness of breath.    Cardiovascular: Negative for chest pain and palpitations.   Musculoskeletal: Negative for myalgias.       Objective     Vitals  Vitals:    06/21/23 0758   BP: 122/88   BP Location: Left upper arm   Patient Position: Sitting   Pulse: 84   Resp: 18   Temp: 36.5 °C (97.7 °F)   TempSrc: Temporal   SpO2: 99%   Weight: 76.2 kg (168 lb)   Height: 1.651 m (5' 5\")     Body mass index is 27.96 kg/m².      Physical Exam  Vitals reviewed.   Constitutional:       General: She is not in acute distress.     Appearance: Normal appearance. She is not diaphoretic.   Cardiovascular:      Rate and Rhythm: Normal rate and regular rhythm.      Heart sounds: No murmur heard.     No friction rub. No gallop.   Pulmonary:      Effort: Pulmonary effort is normal.      Breath sounds: Normal breath sounds. No wheezing, rhonchi or rales.   Musculoskeletal:      Right lower leg: No edema.      Left lower leg: No edema.   Neurological:      Mental Status: She is " alert and oriented to person, place, and time.         Assessment/Plan     Problem List Items Addressed This Visit     Mild asthma     Stable on current meds.         Relevant Medications    montelukast (SINGULAIR) 10 mg tablet    Anxiety     Stable on Sertraline daily.         Relevant Medications    sertraline (ZOLOFT) 50 mg tablet    Impaired fasting glucose     Stable.         Hypercholesterolemia     Cardiology follows.  Recent labs  Stable on statin.         Benign essential HTN - Primary     BP stable.  Continue Losartan.         Relevant Medications    losartan (COZAAR) 50 mg tablet    Lung nodules     Pulmonary consult for opinion on following recent CT abnormalities.         Relevant Medications    montelukast (SINGULAIR) 10 mg tablet    Other Relevant Orders    Ambulatory referral to Pulmonology    Follicular neoplasm of thyroid     Dr Heller following  Recent surgery.  They are managing hypothyroidism.         Relevant Medications    levothyroxine (SYNTHROID) 125 mcg tablet   Other Visit Diagnoses     Need for vaccination        Relevant Orders    Shingrix (Completed)              [facility]MA Miranda  6/21/2023

## 2023-08-29 DIAGNOSIS — Z80.3 FAMILY HISTORY OF MALIGNANT NEOPLASM OF BREAST: Primary | ICD-10-CM

## 2023-08-29 DIAGNOSIS — Z12.31 VISIT FOR SCREENING MAMMOGRAM: ICD-10-CM

## 2023-10-02 ENCOUNTER — HOSPITAL ENCOUNTER (OUTPATIENT)
Dept: RADIOLOGY | Age: 58
Discharge: HOME | End: 2023-10-02
Attending: NURSE PRACTITIONER
Payer: COMMERCIAL

## 2023-10-02 DIAGNOSIS — Z80.3 FAMILY HISTORY OF MALIGNANT NEOPLASM OF BREAST: ICD-10-CM

## 2023-10-02 DIAGNOSIS — Z12.31 VISIT FOR SCREENING MAMMOGRAM: ICD-10-CM

## 2023-10-02 PROCEDURE — 77063 BREAST TOMOSYNTHESIS BI: CPT

## 2023-10-26 ENCOUNTER — APPOINTMENT (OUTPATIENT)
Dept: URBAN - METROPOLITAN AREA CLINIC 203 | Age: 58
Setting detail: DERMATOLOGY
End: 2023-10-29

## 2023-10-26 DIAGNOSIS — L57.8 OTHER SKIN CHANGES DUE TO CHRONIC EXPOSURE TO NONIONIZING RADIATION: ICD-10-CM

## 2023-10-26 DIAGNOSIS — L21.8 OTHER SEBORRHEIC DERMATITIS: ICD-10-CM

## 2023-10-26 PROCEDURE — OTHER PRESCRIPTION MEDICATION MANAGEMENT: OTHER

## 2023-10-26 PROCEDURE — OTHER COUNSELING: OTHER

## 2023-10-26 PROCEDURE — OTHER SUNSCREEN RECOMMENDATIONS: OTHER

## 2023-10-26 PROCEDURE — 99214 OFFICE O/P EST MOD 30 MIN: CPT

## 2023-10-26 ASSESSMENT — LOCATION SIMPLE DESCRIPTION DERM
LOCATION SIMPLE: LEFT BREAST
LOCATION SIMPLE: LEFT EYEBROW
LOCATION SIMPLE: RIGHT EYEBROW

## 2023-10-26 ASSESSMENT — SEVERITY ASSESSMENT: HOW SEVERE IS THIS PATIENT'S CONDITION?: CLEAR

## 2023-10-26 ASSESSMENT — LOCATION DETAILED DESCRIPTION DERM
LOCATION DETAILED: LEFT MEDIAL BREAST 10-11:00 REGION
LOCATION DETAILED: LEFT MEDIAL BREAST 11-12:00 REGION
LOCATION DETAILED: LEFT CENTRAL EYEBROW
LOCATION DETAILED: RIGHT CENTRAL EYEBROW

## 2023-10-26 ASSESSMENT — LOCATION ZONE DERM
LOCATION ZONE: FACE
LOCATION ZONE: TRUNK

## 2023-11-07 ENCOUNTER — OFFICE VISIT (OUTPATIENT)
Dept: SURGERY | Facility: CLINIC | Age: 58
End: 2023-11-07
Payer: COMMERCIAL

## 2023-11-07 VITALS
DIASTOLIC BLOOD PRESSURE: 80 MMHG | TEMPERATURE: 97.6 F | WEIGHT: 155.6 LBS | OXYGEN SATURATION: 97 % | HEART RATE: 105 BPM | BODY MASS INDEX: 25.92 KG/M2 | SYSTOLIC BLOOD PRESSURE: 110 MMHG | HEIGHT: 65 IN

## 2023-11-07 DIAGNOSIS — Z91.89 AT HIGH RISK FOR BREAST CANCER: ICD-10-CM

## 2023-11-07 DIAGNOSIS — Z80.3 FAMILY HISTORY OF MALIGNANT NEOPLASM OF BREAST: Primary | ICD-10-CM

## 2023-11-07 PROCEDURE — 99213 OFFICE O/P EST LOW 20 MIN: CPT | Performed by: NURSE PRACTITIONER

## 2023-11-07 PROCEDURE — 3074F SYST BP LT 130 MM HG: CPT | Performed by: NURSE PRACTITIONER

## 2023-11-07 PROCEDURE — 3008F BODY MASS INDEX DOCD: CPT | Performed by: NURSE PRACTITIONER

## 2023-11-07 PROCEDURE — 3079F DIAST BP 80-89 MM HG: CPT | Performed by: NURSE PRACTITIONER

## 2023-11-07 NOTE — PROGRESS NOTES
[facility]  [facility]  [address] [place] [address]  Phone: [phone]  Fax: [phone]      Patient ID: Madonna Tipton                              : 1965    Visit Date: 2023  Referring Provider: No ref. provider found   PCP: Laura Balderas CRNP  GYN: Patient Care Team:  Kevin Vasquez MD as Consulting Physician (Gynecologic Oncology)    Subjective:  Amber returns to the office today for high risk screening.  She reports no breast complaints or changes to her self-exam.     Oncology History:    Cancer Staging   No matching staging information was found for the patient.         Oncology History   Submucous and subserous leiomyoma of uterus (Resolved)   2018 Initial Diagnosis    Submucous and subserous leiomyoma of uterus               Breast Health:  Age at menarche: 14  Age at first live birth: 29   Age of menopause: 56      Allergies: Feathers, Mold, and Shellfish derived    Current Medications: has a current medication list which includes the following prescription(s): budesonide-formoterol, cholecalciferol (vitamin d3), hydrocortisone-iodoquinol, levothyroxine, losartan, lycopene, montelukast, proair hfa, rosuvastatin, and sertraline.    Past Medical History:  has a past medical history of Abnormal Pap smear of cervix, Anxiety, Asthma, Body mass index (BMI) greater than 99th percentile for age in overweight child, Chlamydia, Fibrocystic breast, Fibroid, H/O mammogram (2017), Hypertension, Lipid disorder, Lung nodules, Palpitations, Personal history of urinary disorder, Screening for breast cancer, Sensation of pressure in bladder area, Thyroid cancer (CMS/HCC) (), and Urinary retention with incomplete bladder emptying.    She has no past medical history of Colon polyp, lipoma, or Melanoma (CMS/HCC).    Past Surgical History:  has a past surgical history that includes Colonoscopy (); Hernia repair (2016); Laparoscopic total  hysterectomy (N/A, 2018); Breast biopsy; and Dental surgery.    Social History:   Social History     Tobacco Use    Smoking status: Former     Packs/day: 2.50     Years: 18.00     Additional pack years: 0.00     Total pack years: 45.00     Types: Cigarettes     Quit date:      Years since quittin.8    Smokeless tobacco: Never   Vaping Use    Vaping Use: Never used   Substance Use Topics    Alcohol use: Not Currently     Alcohol/week: 14.0 standard drinks of alcohol     Types: 14 Standard drinks or equivalent per week     Comment: social    Drug use: No       Family History: family history includes Breast cancer in her biological mother; COPD in her maternal grandmother; Colon cancer in her maternal grandfather.        Physical Exam:    Vitals:   Visit Vitals  LMP 2018     There is no height or weight on file to calculate BMI.    Physical Exam  Physical Examination performed in the supine and sitting position  BREAST SIZE: Medium  SYMMETRY yes       SKIN - Skin color, texture, turgor normal. No rashes or lesions Skin is without tethering, dimpling, retraction or increased vascularity  AREOLA - normal    NIPPLES -  normal without retraction and without discharge  LYMPH NODES: infra, supra, cervical, parasternal and axillary nodes normal bilaterally  BREAST TISSUE: Right breast normal without discrete lesions. Left Breast  normal without discrete lesions.       Breast Imaging: I have personally reviewed the reports and images as follows.  Mammogram 10-23  Density category C  No suspicious findings in either breast.  Impression:  Increased risk of breast cancer   density category C    Recommendation and Plan:   This is a 58-year-old female who presented to the office today in high risk follow-up.  She is doing well.  We did review her exam and her imaging as well as indications which would warrant sooner follow-up or diagnostic evaluation.  She was provided with prescriptions for upcoming  screening.  MRI will be completed 6 months from her most recent mammogram and therefore will be due in April.  Follow-up mammogram in October 2024.  She will return to the office in 1 year or sooner with changes.  Lastly, she has been reminded to continue with her routine and preventative health care.    All of the questions were answered.  The patient is in agreement with the treatment plan.      No follow-ups on file.        11/7/2023   1:44 PM        EMMA Harris

## 2023-11-27 RX ORDER — BUDESONIDE AND FORMOTEROL FUMARATE DIHYDRATE 80; 4.5 UG/1; UG/1
AEROSOL RESPIRATORY (INHALATION)
Qty: 10.2 G | Refills: 1 | Status: SHIPPED | OUTPATIENT
Start: 2023-11-27 | End: 2024-01-02

## 2023-11-27 NOTE — TELEPHONE ENCOUNTER
Medicine Refill Request    Last Office Visit: 6/21/2023   Last Consult Visit: Visit date not found  Last Telemedicine Visit: 11/5/2021 Erma Guillen MD    Next Appointment: 12/20/2023      Current Outpatient Medications:     budesonide-formoteroL (SYMBICORT) 80-4.5 mcg/actuation inhaler, INHALE 2 PUFFS INTO THE LUNGS TWICE A DAY RINSE MOUTH WITH WATER AFTER USE, DO NOT SWALLOW. Strength: 80-4.5 mcg/actuation, Disp: 10.2 g, Rfl: 1    cholecalciferol, vitamin D3, 1,000 unit (25 mcg) tablet, Take 1,000 Units by mouth daily., Disp: , Rfl:     hydrocortisone-iodoquinoL (VYTONE) 1-1 % cream, Apply topically 3 (three) times a day as needed (for itching). (Patient not taking: Reported on 11/7/2023), Disp: 45 g, Rfl: 0    levothyroxine (SYNTHROID) 125 mcg tablet, Take 125 mcg by mouth daily before breakfast., Disp: , Rfl:     losartan (COZAAR) 50 mg tablet, Take 50 mg by mouth daily., Disp: , Rfl:     lycopene 10 mg capsule, Take by mouth., Disp: , Rfl:     montelukast (SINGULAIR) 10 mg tablet, Take 1 tablet (10 mg total) by mouth once daily., Disp: 90 tablet, Rfl: 1    PROAIR HFA 90 mcg/actuation inhaler, Inhale 2 puffs 4 (four) times a day as needed for shortness of breath., Disp: 18 g, Rfl: 1    rosuvastatin (CRESTOR) 5 mg tablet, Take 5 mg by mouth daily., Disp: , Rfl:     sertraline (ZOLOFT) 50 mg tablet, Take 1 tablet (50 mg total) by mouth once daily., Disp: 90 tablet, Rfl: 1      BP Readings from Last 3 Encounters:   11/07/23 110/80   06/21/23 122/88   02/01/23 130/80       Recent Lab results:  Lab Results   Component Value Date    CHOL 289 (H) 10/22/2021   ,   Lab Results   Component Value Date    HDL 88 10/22/2021   ,   Lab Results   Component Value Date    LDLCALC 185 (H) 10/22/2021   ,   Lab Results   Component Value Date    TRIG 96 10/22/2021        Lab Results   Component Value Date    GLUCOSE 111 (H) 10/22/2021   ,   Lab Results   Component Value Date    HGBA1C 5.3 10/22/2021         Lab Results    Component Value Date    CREATININE 0.64 10/22/2021       Lab Results   Component Value Date    TSH 1.230 11/07/2022           Lab Results   Component Value Date    HGBA1C 5.3 10/22/2021

## 2023-12-18 DIAGNOSIS — J45.20 MILD INTERMITTENT ASTHMA WITHOUT COMPLICATION: ICD-10-CM

## 2023-12-18 RX ORDER — ALBUTEROL SULFATE 90 UG/1
2 AEROSOL, METERED RESPIRATORY (INHALATION) 4 TIMES DAILY PRN
Qty: 18 G | Refills: 1 | Status: SHIPPED | OUTPATIENT
Start: 2023-12-18 | End: 2023-12-28

## 2023-12-18 NOTE — TELEPHONE ENCOUNTER
Medicine Refill Request    Last Office Visit: 6/21/2023   Last Consult Visit: Visit date not found  Last Telemedicine Visit: 11/5/2021 Erma Guillen MD    Next Appointment: 12/20/2023      Current Outpatient Medications:   •  budesonide-formoteroL (SYMBICORT) 80-4.5 mcg/actuation inhaler, INHALE 2 PUFFS INTO THE LUNGS TWICE A DAY RINSE MOUTH WITH WATER AFTER USE, DO NOT SWALLOW., Disp: 10.2 g, Rfl: 1  •  cholecalciferol, vitamin D3, 1,000 unit (25 mcg) tablet, Take 1,000 Units by mouth daily., Disp: , Rfl:   •  hydrocortisone-iodoquinoL (VYTONE) 1-1 % cream, Apply topically 3 (three) times a day as needed (for itching). (Patient not taking: Reported on 11/7/2023), Disp: 45 g, Rfl: 0  •  levothyroxine (SYNTHROID) 125 mcg tablet, Take 125 mcg by mouth daily before breakfast., Disp: , Rfl:   •  losartan (COZAAR) 50 mg tablet, Take 50 mg by mouth daily., Disp: , Rfl:   •  lycopene 10 mg capsule, Take by mouth., Disp: , Rfl:   •  montelukast (SINGULAIR) 10 mg tablet, Take 1 tablet (10 mg total) by mouth once daily., Disp: 90 tablet, Rfl: 1  •  PROAIR HFA 90 mcg/actuation inhaler, Inhale 2 puffs 4 (four) times a day as needed for shortness of breath., Disp: 18 g, Rfl: 1  •  rosuvastatin (CRESTOR) 5 mg tablet, Take 5 mg by mouth daily., Disp: , Rfl:   •  sertraline (ZOLOFT) 50 mg tablet, Take 1 tablet (50 mg total) by mouth once daily., Disp: 90 tablet, Rfl: 1      BP Readings from Last 3 Encounters:   11/07/23 110/80   06/21/23 122/88   02/01/23 130/80       Recent Lab results:  Lab Results   Component Value Date    CHOL 289 (H) 10/22/2021   ,   Lab Results   Component Value Date    HDL 88 10/22/2021   ,   Lab Results   Component Value Date    LDLCALC 185 (H) 10/22/2021   ,   Lab Results   Component Value Date    TRIG 96 10/22/2021        Lab Results   Component Value Date    GLUCOSE 111 (H) 10/22/2021   ,   Lab Results   Component Value Date    HGBA1C 5.3 10/22/2021         Lab Results   Component Value Date     CREATININE 0.64 10/22/2021       Lab Results   Component Value Date    TSH 1.230 11/07/2022           Lab Results   Component Value Date    HGBA1C 5.3 10/22/2021

## 2023-12-20 ENCOUNTER — OFFICE VISIT (OUTPATIENT)
Dept: PRIMARY CARE | Facility: CLINIC | Age: 58
End: 2023-12-20
Payer: COMMERCIAL

## 2023-12-20 VITALS
TEMPERATURE: 97.9 F | OXYGEN SATURATION: 99 % | WEIGHT: 154.6 LBS | RESPIRATION RATE: 18 BRPM | DIASTOLIC BLOOD PRESSURE: 82 MMHG | HEART RATE: 84 BPM | SYSTOLIC BLOOD PRESSURE: 130 MMHG | HEIGHT: 65 IN | BODY MASS INDEX: 25.76 KG/M2

## 2023-12-20 DIAGNOSIS — F41.9 ANXIETY: ICD-10-CM

## 2023-12-20 DIAGNOSIS — J45.909 MILD ASTHMA, UNSPECIFIED WHETHER COMPLICATED, UNSPECIFIED WHETHER PERSISTENT: ICD-10-CM

## 2023-12-20 DIAGNOSIS — R91.8 LUNG NODULES: ICD-10-CM

## 2023-12-20 DIAGNOSIS — E78.00 HYPERCHOLESTEROLEMIA: ICD-10-CM

## 2023-12-20 DIAGNOSIS — Z23 NEED FOR VACCINATION: ICD-10-CM

## 2023-12-20 DIAGNOSIS — R73.01 IMPAIRED FASTING GLUCOSE: ICD-10-CM

## 2023-12-20 DIAGNOSIS — D49.7 FOLLICULAR NEOPLASM OF THYROID: ICD-10-CM

## 2023-12-20 DIAGNOSIS — J45.20 MILD INTERMITTENT ASTHMA WITHOUT COMPLICATION: ICD-10-CM

## 2023-12-20 DIAGNOSIS — I10 BENIGN ESSENTIAL HTN: Primary | ICD-10-CM

## 2023-12-20 PROCEDURE — 90750 HZV VACC RECOMBINANT IM: CPT | Performed by: NURSE PRACTITIONER

## 2023-12-20 PROCEDURE — 90471 IMMUNIZATION ADMIN: CPT | Performed by: NURSE PRACTITIONER

## 2023-12-20 PROCEDURE — 99213 OFFICE O/P EST LOW 20 MIN: CPT | Mod: 25 | Performed by: NURSE PRACTITIONER

## 2023-12-20 PROCEDURE — 3079F DIAST BP 80-89 MM HG: CPT | Performed by: NURSE PRACTITIONER

## 2023-12-20 PROCEDURE — 3075F SYST BP GE 130 - 139MM HG: CPT | Performed by: NURSE PRACTITIONER

## 2023-12-20 PROCEDURE — 3008F BODY MASS INDEX DOCD: CPT | Performed by: NURSE PRACTITIONER

## 2023-12-20 RX ORDER — SERTRALINE HYDROCHLORIDE 50 MG/1
50 TABLET, FILM COATED ORAL
Qty: 90 TABLET | Refills: 1 | Status: SHIPPED | OUTPATIENT
Start: 2023-12-20 | End: 2024-07-01

## 2023-12-20 RX ORDER — MONTELUKAST SODIUM 10 MG/1
10 TABLET ORAL
Qty: 90 TABLET | Refills: 1 | Status: SHIPPED | OUTPATIENT
Start: 2023-12-20 | End: 2024-06-10

## 2023-12-20 ASSESSMENT — PAIN SCALES - GENERAL: PAINLEVEL: 0-NO PAIN

## 2023-12-20 ASSESSMENT — PATIENT HEALTH QUESTIONNAIRE - PHQ9: SUM OF ALL RESPONSES TO PHQ9 QUESTIONS 1 & 2: 0

## 2023-12-20 ASSESSMENT — ENCOUNTER SYMPTOMS
DEPRESSED MOOD: 0
SHORTNESS OF BREATH: 0
NERVOUS/ANXIOUS: 1
PALPITATIONS: 0

## 2023-12-20 NOTE — PROGRESS NOTES
[facility] at [address]  [place] [address]  [phone]  Fax [phone]      Patient ID: Madonna Tipton                              : 1965    Visit Date: 2023    Chief Complaint: No chief complaint on file.         Patient ID: Madonna Tipton is a 58 y.o. female.    Patient Active Problem List   Diagnosis   • At high risk for breast cancer   • History of hysterectomy for indication other than cancer   • Mild asthma   • Mild vitamin D deficiency   • Cyst of left ovary   • Anxiety   • Family history of malignant neoplasm of breast   • Encounter for gynecological examination without abnormal finding   • Impaired fasting glucose   • Hypercholesterolemia   • Former smoker   • Benign essential HTN   • Lung nodules   • Follicular neoplasm of thyroid         Current Outpatient Medications:   •  budesonide-formoteroL (SYMBICORT) 80-4.5 mcg/actuation inhaler, INHALE 2 PUFFS INTO THE LUNGS TWICE A DAY RINSE MOUTH WITH WATER AFTER USE, DO NOT SWALLOW., Disp: 10.2 g, Rfl: 1  •  cholecalciferol, vitamin D3, 1,000 unit (25 mcg) tablet, Take 1,000 Units by mouth daily., Disp: , Rfl:   •  hydrocortisone-iodoquinoL (VYTONE) 1-1 % cream, Apply topically 3 (three) times a day as needed (for itching)., Disp: 45 g, Rfl: 0  •  levothyroxine (SYNTHROID) 125 mcg tablet, Take 125 mcg by mouth daily before breakfast., Disp: , Rfl:   •  losartan (COZAAR) 50 mg tablet, Take 50 mg by mouth daily., Disp: , Rfl:   •  lycopene 10 mg capsule, Take by mouth., Disp: , Rfl:   •  montelukast (SINGULAIR) 10 mg tablet, Take 1 tablet (10 mg total) by mouth once daily., Disp: 90 tablet, Rfl: 1  •  PROAIR HFA 90 mcg/actuation inhaler, Inhale 2 puffs 4 (four) times a day as needed for shortness of breath., Disp: 18 g, Rfl: 1  •  rosuvastatin (CRESTOR) 5 mg tablet, Take 5 mg by mouth daily., Disp: , Rfl:   •  sertraline (ZOLOFT) 50 mg tablet, Take 1 tablet (50 mg  total) by mouth once daily., Disp: 90 tablet, Rfl: 1    Allergies   Allergen Reactions   • Feathers Shortness of breath   • Mold Shortness of breath   • Shellfish Derived GI intolerance       Social History     Tobacco Use   • Smoking status: Former     Packs/day: 2.50     Years: 18.00     Additional pack years: 0.00     Total pack years: 45.00     Types: Cigarettes     Quit date:      Years since quittin.9   • Smokeless tobacco: Never   Vaping Use   • Vaping Use: Never used   Substance Use Topics   • Alcohol use: Not Currently     Alcohol/week: 14.0 standard drinks of alcohol     Types: 14 Standard drinks or equivalent per week     Comment: social   • Drug use: No       Health Maintenance   Topic Date Due   • Zoster Vaccine (2 of 2) 2023   • Pneumococcal (1 - PCV) 2023 (Originally 1972)   • HIV Screening  2050 (Originally 1979)   • Breast Cancer Screening  10/02/2024   • Depression Screening  2024   • Colorectal Cancer Screening  2027   • DTaP, Tdap, and Td Vaccines (3 - Td or Tdap) 2033   • Influenza Vaccine  Completed   • COVID-19 Vaccine  Completed   • Meningococcal ACWY  Aged Out   • HIB Vaccines  Aged Out   • IPV Vaccines  Aged Out   • HPV Vaccines  Aged Out   • Hepatitis B Vaccines  Discontinued   • Hepatitis C Screening  Discontinued       HPI  Med check    Asthma  There is no shortness of breath. This is a chronic problem. The current episode started more than 1 year ago. The problem occurs intermittently. Progression since onset: stable. Pertinent negatives include no chest pain. Her symptoms are aggravated by URI. Her symptoms are alleviated by beta-agonist and steroid inhaler. She reports significant improvement on treatment. Her past medical history is significant for asthma.   Anxiety  Presents for follow-up visit. Symptoms include excessive worry and nervous/anxious behavior. Patient reports no chest pain, depressed mood, palpitations, shortness  "of breath or suicidal ideas. Symptoms occur occasionally. The severity of symptoms is mild. The quality of sleep is good.     Her past medical history is significant for asthma. Compliance with medications is % (on Sertraline). Treatment side effects: none.       The following have been reviewed and updated as appropriate in this visit:   Allergies  Meds  Problems         Review of System  Review of Systems   Respiratory: Negative for shortness of breath.    Cardiovascular: Negative for chest pain and palpitations.   Psychiatric/Behavioral: Negative for suicidal ideas. The patient is nervous/anxious.        Objective     Vitals  Vitals:    12/20/23 0803   BP: 130/82   BP Location: Left upper arm   Patient Position: Sitting   Pulse: 84   Resp: 18   Temp: 36.6 °C (97.9 °F)   TempSrc: Temporal   SpO2: 99%   Weight: 70.1 kg (154 lb 9.6 oz)   Height: 1.651 m (5' 5\")     Body mass index is 25.73 kg/m².      Physical Exam  Vitals reviewed.   Constitutional:       General: She is not in acute distress.     Appearance: Normal appearance. She is not ill-appearing, toxic-appearing or diaphoretic.   Cardiovascular:      Rate and Rhythm: Normal rate and regular rhythm.   Pulmonary:      Effort: Pulmonary effort is normal. No respiratory distress.   Musculoskeletal:      Right lower leg: No edema.      Left lower leg: No edema.   Neurological:      Mental Status: She is alert and oriented to person, place, and time.   Psychiatric:         Mood and Affect: Mood and affect normal.         Speech: Speech normal.         Behavior: Behavior normal.         Assessment/Plan     Problem List Items Addressed This Visit     Mild asthma     Stable on inhalers  Recent flare with illness but ok now.         Relevant Medications    montelukast (SINGULAIR) 10 mg tablet    Anxiety     Stable on Sertraline.  Continue daily med  Follow up 6 months         Relevant Medications    sertraline (ZOLOFT) 50 mg tablet    Impaired fasting glucose "     Recent glucose 104  Continue to monitor.         Hypercholesterolemia     Cardiology following  Stable on statin.         Benign essential HTN - Primary     BP stable on Losartan.         Lung nodules     Saw Pulmonary  Follow up CT May 2024 recommended.         Relevant Medications    montelukast (SINGULAIR) 10 mg tablet    Follicular neoplasm of thyroid     [facility]  Dr Heller following.        Other Visit Diagnoses     Need for vaccination        Relevant Orders    [facility] Amor  12/20/2023

## 2023-12-28 ENCOUNTER — TELEPHONE (OUTPATIENT)
Dept: PRIMARY CARE | Facility: CLINIC | Age: 58
End: 2023-12-28
Payer: COMMERCIAL

## 2023-12-28 RX ORDER — ALBUTEROL SULFATE 90 UG/1
2 INHALANT RESPIRATORY (INHALATION) EVERY 6 HOURS PRN
Qty: 18 G | Refills: 1 | Status: SHIPPED | OUTPATIENT
Start: 2023-12-28 | End: 2025-02-27

## 2023-12-28 NOTE — TELEPHONE ENCOUNTER
The following message was left in the Non-Urgent Clinical Team Voicemail:      Caller/Contact info:    [x] Patient -   [] Spouse -   [] Parent: -   [] Other -      Message:  Proair inhaler Laura prescribed is not available. Needs new rx sent to pharmacy    Voicemail Details: The voicemail was left @ 1:47p 12/28/23

## 2024-01-02 RX ORDER — BUDESONIDE AND FORMOTEROL FUMARATE DIHYDRATE 80; 4.5 UG/1; UG/1
AEROSOL RESPIRATORY (INHALATION)
Qty: 10.2 G | Refills: 1 | Status: SHIPPED | OUTPATIENT
Start: 2024-01-02 | End: 2024-11-13 | Stop reason: SDUPTHER

## 2024-01-02 NOTE — TELEPHONE ENCOUNTER
Medicine Refill Request    Last Office Visit: 12/20/2023   Last Consult Visit: Visit date not found  Last Telemedicine Visit: 11/5/2021 Erma Guillen MD    Next Appointment: 7/10/2024      Current Outpatient Medications:   •  albuterol HFA 90 mcg/actuation inhaler, Inhale 2 puffs every 6 (six) hours as needed for wheezing., Disp: 18 g, Rfl: 1  •  budesonide-formoteroL (SYMBICORT) 80-4.5 mcg/actuation inhaler, INHALE 2 PUFFS INTO THE LUNGS TWICE A DAY RINSE MOUTH WITH WATER AFTER USE, DO NOT SWALLOW., Disp: 10.2 g, Rfl: 1  •  cholecalciferol, vitamin D3, 1,000 unit (25 mcg) tablet, Take 1,000 Units by mouth daily., Disp: , Rfl:   •  hydrocortisone-iodoquinoL (VYTONE) 1-1 % cream, Apply topically 3 (three) times a day as needed (for itching)., Disp: 45 g, Rfl: 0  •  levothyroxine (SYNTHROID) 125 mcg tablet, Take 125 mcg by mouth daily before breakfast., Disp: , Rfl:   •  losartan (COZAAR) 50 mg tablet, Take 50 mg by mouth daily., Disp: , Rfl:   •  lycopene 10 mg capsule, Take by mouth., Disp: , Rfl:   •  montelukast (SINGULAIR) 10 mg tablet, Take 1 tablet (10 mg total) by mouth once daily., Disp: 90 tablet, Rfl: 1  •  rosuvastatin (CRESTOR) 5 mg tablet, Take 5 mg by mouth daily., Disp: , Rfl:   •  sertraline (ZOLOFT) 50 mg tablet, Take 1 tablet (50 mg total) by mouth once daily., Disp: 90 tablet, Rfl: 1      BP Readings from Last 3 Encounters:   12/20/23 130/82   11/07/23 110/80   06/21/23 122/88       Recent Lab results:  Lab Results   Component Value Date    CHOL 289 (H) 10/22/2021   ,   Lab Results   Component Value Date    HDL 88 10/22/2021   ,   Lab Results   Component Value Date    LDLCALC 185 (H) 10/22/2021   ,   Lab Results   Component Value Date    TRIG 96 10/22/2021        Lab Results   Component Value Date    GLUCOSE 111 (H) 10/22/2021   ,   Lab Results   Component Value Date    HGBA1C 5.3 10/22/2021         Lab Results   Component Value Date    CREATININE 0.64 10/22/2021       Lab Results   Component  Value Date    TSH 1.230 11/07/2022           Lab Results   Component Value Date    HGBA1C 5.3 10/22/2021

## 2024-01-29 NOTE — PROGRESS NOTES
"    [facility]  [facility]  [address] [place] [address]  Phone: [phone]  Fax: [phone]      Patient ID: Madonna Tipton                              : 1965    Visit Date: 2024  Referring Provider: No ref. provider found   PCP: Laura Balderas CRNP  GYN: Patient Care Team:  Kevin Vasquez MD as Consulting Physician (Gynecologic Oncology)    Subjective:  \" Amber\" is a high risk patient who presents today with a new palpable lump in her left breast.  She notes that she noticed it approximately 2 weeks ago however was away and therefore this was the soonest she could come in.  She does not feel like it is changed.  It is slightly tender on palpation.     Oncology History:    Cancer Staging   No matching staging information was found for the patient.         Oncology History   Submucous and subserous leiomyoma of uterus (Resolved)   2018 Initial Diagnosis    Submucous and subserous leiomyoma of uterus               Breast Health:  Age at menarche: 14  Age at first live birth: 29   Age of menopause: 56       Allergies: Feathers, Mold, and Shellfish derived    Current Medications: has a current medication list which includes the following prescription(s): budesonide-formoterol, albuterol hfa, cholecalciferol (vitamin d3), hydrocortisone-iodoquinol, levothyroxine, losartan, lycopene, montelukast, rosuvastatin, and sertraline.    Past Medical History:  has a past medical history of Abnormal Pap smear of cervix, Anxiety, Asthma, Body mass index (BMI) greater than 99th percentile for age in overweight child, Chlamydia, Fibrocystic breast, Fibroid, H/O mammogram (2017), Hypertension, Lipid disorder, Lung nodules, Palpitations, Personal history of urinary disorder, Screening for breast cancer, Sensation of pressure in bladder area, Thyroid cancer (CMS/HCC) (), and Urinary retention with incomplete bladder emptying.    She has no past medical history of " Colon polyp, lipoma, or Melanoma (CMS/HCC).    Past Surgical History:  has a past surgical history that includes Colonoscopy (2016); Hernia repair (2016); Laparoscopic total hysterectomy (N/A, 2018); Breast biopsy; Dental surgery; and Thyroidectomy.    Social History:   Social History     Tobacco Use   • Smoking status: Former     Packs/day: 2.50     Years: 18.00     Additional pack years: 0.00     Total pack years: 45.00     Types: Cigarettes     Quit date:      Years since quittin.0   • Smokeless tobacco: Never   Vaping Use   • Vaping Use: Never used   Substance Use Topics   • Alcohol use: Not Currently     Alcohol/week: 14.0 standard drinks of alcohol     Types: 14 Standard drinks or equivalent per week     Comment: social   • Drug use: No       Family History: family history includes Breast cancer in her biological mother; COPD in her maternal grandmother; Colon cancer in her maternal grandfather.        Physical Exam:    Vitals:   Visit Vitals  LMP 2018     There is no height or weight on file to calculate BMI.    Physical Exam  Physical Examination performed in the supine and sitting position  BREAST SIZE: Medium  SYMMETRY yes       SKIN - Skin color, texture, turgor normal. No rashes or lesions Skin is without tethering, dimpling, retraction or increased vascularity  AREOLA - normal    NIPPLES -  normal without retraction and without discharge  LYMPH NODES: infra, supra, cervical, parasternal and axillary nodes normal bilaterally  BREAST TISSUE: Right breast normal without discrete lesions. Left Breast 10 o'clock position retroareolar space there is a 5 to 6 mm nodule which is firm and potentially fixed.    Breast Imaging: I have personally reviewed the reports and images as follows.    Impression:  Left breast lump  Recommendation and Plan:   This is a 58-year-old female presented to the office today for a new palpable lesion.  I did appreciate the same lesion that she felt.   Diagnostic imaging was ordered and the patient was sent directly to the breast center.  I will call her with results when they become available to me.    All of the questions were answered.  The patient is in agreement with the treatment plan.      No follow-ups on file.        1/29/2024   3:15 PM        [facility]

## 2024-01-30 ENCOUNTER — HOSPITAL ENCOUNTER (OUTPATIENT)
Dept: RADIOLOGY | Age: 59
Discharge: HOME | End: 2024-01-30
Attending: NURSE PRACTITIONER
Payer: COMMERCIAL

## 2024-01-30 ENCOUNTER — OFFICE VISIT (OUTPATIENT)
Dept: SURGERY | Facility: CLINIC | Age: 59
End: 2024-01-30
Payer: COMMERCIAL

## 2024-01-30 ENCOUNTER — HOSPITAL ENCOUNTER (OUTPATIENT)
Dept: RADIOLOGY | Age: 59
Discharge: HOME | End: 2024-01-30
Attending: STUDENT IN AN ORGANIZED HEALTH CARE EDUCATION/TRAINING PROGRAM
Payer: COMMERCIAL

## 2024-01-30 VITALS
WEIGHT: 156 LBS | OXYGEN SATURATION: 98 % | DIASTOLIC BLOOD PRESSURE: 84 MMHG | BODY MASS INDEX: 25.99 KG/M2 | HEART RATE: 84 BPM | HEIGHT: 65 IN | SYSTOLIC BLOOD PRESSURE: 142 MMHG | TEMPERATURE: 97.8 F

## 2024-01-30 DIAGNOSIS — N63.22 MASS OF UPPER INNER QUADRANT OF LEFT BREAST: ICD-10-CM

## 2024-01-30 DIAGNOSIS — N63.22 MASS OF UPPER INNER QUADRANT OF LEFT BREAST: Primary | ICD-10-CM

## 2024-01-30 PROCEDURE — 76642 ULTRASOUND BREAST LIMITED: CPT | Mod: LT

## 2024-01-30 PROCEDURE — 3008F BODY MASS INDEX DOCD: CPT | Performed by: NURSE PRACTITIONER

## 2024-01-30 PROCEDURE — 3077F SYST BP >= 140 MM HG: CPT | Performed by: NURSE PRACTITIONER

## 2024-01-30 PROCEDURE — 99213 OFFICE O/P EST LOW 20 MIN: CPT | Performed by: NURSE PRACTITIONER

## 2024-01-30 PROCEDURE — G0279 TOMOSYNTHESIS, MAMMO: HCPCS | Mod: LT

## 2024-01-30 PROCEDURE — 3079F DIAST BP 80-89 MM HG: CPT | Performed by: NURSE PRACTITIONER

## 2024-01-31 DIAGNOSIS — N63.22 MASS OF UPPER INNER QUADRANT OF LEFT BREAST: Primary | ICD-10-CM

## 2024-02-01 ENCOUNTER — TELEPHONE (OUTPATIENT)
Dept: SURGERY | Facility: CLINIC | Age: 59
End: 2024-02-01
Payer: COMMERCIAL

## 2024-02-01 NOTE — TELEPHONE ENCOUNTER
LEFT PT A DETAILED VM MESSAGE WITH BREAST MRI AUTH# [id number] (VALID 1/31-4/29/24) TO BE DONE @ [facility]/.  PT WILL SCHEDULE APPT AND I TOLD HER TO CALL ME BACK IF I CAN HELP IN ANY WAY.  MMM

## 2024-02-19 ENCOUNTER — HOSPITAL ENCOUNTER (OUTPATIENT)
Dept: RADIOLOGY | Age: 59
Discharge: HOME | End: 2024-02-19
Attending: NURSE PRACTITIONER
Payer: COMMERCIAL

## 2024-02-19 DIAGNOSIS — N63.22 MASS OF UPPER INNER QUADRANT OF LEFT BREAST: ICD-10-CM

## 2024-02-19 RX ORDER — GADOBUTROL 604.72 MG/ML
7.1 INJECTION INTRAVENOUS ONCE
Status: COMPLETED | OUTPATIENT
Start: 2024-02-19 | End: 2024-02-19

## 2024-02-19 RX ADMIN — GADOBUTROL 7.1 ML: 604.72 INJECTION INTRAVENOUS at 11:31

## 2024-02-20 ENCOUNTER — TRANSCRIBE ORDERS (OUTPATIENT)
Dept: SURGERY | Facility: CLINIC | Age: 59
End: 2024-02-20

## 2024-02-20 ENCOUNTER — PATIENT OUTREACH (OUTPATIENT)
Dept: RADIOLOGY | Age: 59
End: 2024-02-20
Payer: COMMERCIAL

## 2024-02-20 DIAGNOSIS — R92.8 ABNORMAL MAMMOGRAM: Primary | ICD-10-CM

## 2024-02-26 ENCOUNTER — HOSPITAL ENCOUNTER (OUTPATIENT)
Dept: RADIOLOGY | Age: 59
Discharge: HOME | End: 2024-02-26
Attending: NURSE PRACTITIONER
Payer: COMMERCIAL

## 2024-02-26 VITALS — DIASTOLIC BLOOD PRESSURE: 72 MMHG | SYSTOLIC BLOOD PRESSURE: 164 MMHG

## 2024-02-26 DIAGNOSIS — R92.8 ABNORMAL MAMMOGRAM: ICD-10-CM

## 2024-02-26 PROCEDURE — 0HBU3ZX EXCISION OF LEFT BREAST, PERCUTANEOUS APPROACH, DIAGNOSTIC: ICD-10-PCS | Performed by: RADIOLOGY

## 2024-02-26 PROCEDURE — 25000000 HC PHARMACY GENERAL: Performed by: NURSE PRACTITIONER

## 2024-02-26 PROCEDURE — BH41ZZZ ULTRASONOGRAPHY OF LEFT BREAST: ICD-10-PCS | Performed by: RADIOLOGY

## 2024-02-26 PROCEDURE — 88305 TISSUE EXAM BY PATHOLOGIST: CPT | Performed by: NURSE PRACTITIONER

## 2024-02-26 PROCEDURE — 36100360 US GUIDED BREAST BIOPSY LEFT

## 2024-02-26 PROCEDURE — 77065 DX MAMMO INCL CAD UNI: CPT | Mod: LT

## 2024-02-26 RX ORDER — LIDOCAINE HYDROCHLORIDE AND EPINEPHRINE 10; 10 UG/ML; MG/ML
0-30 INJECTION, SOLUTION INFILTRATION; PERINEURAL ONCE
Status: COMPLETED | OUTPATIENT
Start: 2024-02-26 | End: 2024-02-26

## 2024-02-26 RX ORDER — LIDOCAINE HYDROCHLORIDE 10 MG/ML
0-10 INJECTION, SOLUTION EPIDURAL; INFILTRATION; INTRACAUDAL; PERINEURAL ONCE
Status: COMPLETED | OUTPATIENT
Start: 2024-02-26 | End: 2024-02-26

## 2024-02-26 RX ADMIN — LIDOCAINE HYDROCHLORIDE AND EPINEPHRINE 15 ML: 10; 10 INJECTION, SOLUTION INFILTRATION; PERINEURAL at 10:43

## 2024-02-26 RX ADMIN — LIDOCAINE HYDROCHLORIDE 7 ML: 10 INJECTION, SOLUTION EPIDURAL; INFILTRATION; INTRACAUDAL; PERINEURAL at 10:40

## 2024-02-26 NOTE — OR SURGEON
Pre-Procedure patient identification:  I am the primary operating surgeon/proceduralist and I have reviewed the applicable pathology reports and radiology studies for this procedure. I have identified the patient and confirmed laterality is left on 02/26/24 at 10:12 AM Piero Goodman MD

## 2024-02-26 NOTE — POST-PROCEDURE NOTE
Immediate Breast Interventional Postprocedure Note    Madonna Tipton     Attending: Piero Goodman MD    Assistant: None     Diagnosis: Breast Abnormality    Description of procedure: Imaging Guided Percutaneous Breast Biopsy     Laterality: Left    Anesthesia:  Local (Lidocaine)    Findings: Breast Abnormality    Complications: None    Estimated Blood Loss: Less than 100 ml    Specimens: Breast Tissue      2/26/2024 11:21 AM

## 2024-04-10 ENCOUNTER — OFFICE VISIT (OUTPATIENT)
Dept: PRIMARY CARE | Facility: CLINIC | Age: 59
End: 2024-04-10
Payer: COMMERCIAL

## 2024-04-10 VITALS
SYSTOLIC BLOOD PRESSURE: 122 MMHG | TEMPERATURE: 98.2 F | BODY MASS INDEX: 26.26 KG/M2 | HEART RATE: 83 BPM | DIASTOLIC BLOOD PRESSURE: 72 MMHG | WEIGHT: 157.6 LBS | HEIGHT: 65 IN | RESPIRATION RATE: 18 BRPM | OXYGEN SATURATION: 99 %

## 2024-04-10 DIAGNOSIS — J45.20 MILD INTERMITTENT ASTHMA WITHOUT COMPLICATION: ICD-10-CM

## 2024-04-10 DIAGNOSIS — B37.31 VAGINAL YEAST INFECTION: ICD-10-CM

## 2024-04-10 DIAGNOSIS — J01.00 ACUTE NON-RECURRENT MAXILLARY SINUSITIS: Primary | ICD-10-CM

## 2024-04-10 PROCEDURE — 3078F DIAST BP <80 MM HG: CPT | Performed by: NURSE PRACTITIONER

## 2024-04-10 PROCEDURE — 3008F BODY MASS INDEX DOCD: CPT | Performed by: NURSE PRACTITIONER

## 2024-04-10 PROCEDURE — 99213 OFFICE O/P EST LOW 20 MIN: CPT | Performed by: NURSE PRACTITIONER

## 2024-04-10 PROCEDURE — 3074F SYST BP LT 130 MM HG: CPT | Performed by: NURSE PRACTITIONER

## 2024-04-10 RX ORDER — FLUCONAZOLE 150 MG/1
TABLET ORAL
Qty: 2 TABLET | Refills: 0 | Status: SHIPPED | OUTPATIENT
Start: 2024-04-10

## 2024-04-10 RX ORDER — AMOXICILLIN AND CLAVULANATE POTASSIUM 875; 125 MG/1; MG/1
1 TABLET, FILM COATED ORAL 2 TIMES DAILY
Qty: 20 TABLET | Refills: 0 | Status: SHIPPED | OUTPATIENT
Start: 2024-04-10 | End: 2024-04-20

## 2024-04-10 ASSESSMENT — ENCOUNTER SYMPTOMS
COUGH: 0
SHORTNESS OF BREATH: 0
SWOLLEN GLANDS: 0
HOARSE VOICE: 0
SORE THROAT: 0
CHILLS: 0
HEADACHES: 1
DIAPHORESIS: 0
SINUS PRESSURE: 1
NECK PAIN: 0

## 2024-04-10 ASSESSMENT — PATIENT HEALTH QUESTIONNAIRE - PHQ9: SUM OF ALL RESPONSES TO PHQ9 QUESTIONS 1 & 2: 0

## 2024-04-10 ASSESSMENT — PAIN SCALES - GENERAL: PAINLEVEL: 0-NO PAIN

## 2024-04-10 NOTE — ASSESSMENT & PLAN NOTE
Augmentin BID #20 with meals  Push po fluids  Saline lavage  Ibuprofen PRN  Follow up if symptoms worsen/persist.

## 2024-04-10 NOTE — PROGRESS NOTES
[facility] at [address]  [place] [address]  [phone]  Fax [phone]      Patient ID: Madonna Tipton                              : 1965    Visit Date: 4/10/2024    Chief Complaint: Sinusitis         Patient ID: Madonna Tipton is a 58 y.o. female.    Patient Active Problem List   Diagnosis    At high risk for breast cancer    History of hysterectomy for indication other than cancer    Mild asthma    Mild vitamin D deficiency    Cyst of left ovary    Anxiety    Family history of malignant neoplasm of breast    Encounter for gynecological examination without abnormal finding    Impaired fasting glucose    Hypercholesterolemia    Former smoker    Benign essential HTN    Lung nodules    Follicular neoplasm of thyroid    Acute non-recurrent maxillary sinusitis         Current Outpatient Medications:     ALPRAZolam (XANAX) 0.5 mg tablet, Take 1 tablet (0.5 mg total) by mouth once as needed for anxiety. Take 1 tablet one hour prior to flight, Disp: 8 tablet, Rfl: 0    amoxicillin-pot clavulanate (AUGMENTIN) 875-125 mg per tablet, Take 1 tablet by mouth 2 (two) times a day for 10 days., Disp: 20 tablet, Rfl: 0    budesonide-formoteroL (SYMBICORT) 80-4.5 mcg/actuation inhaler, INHALE 2 PUFFS INTO THE LUNGS TWICE A DAY RINSE MOUTH WITH WATER AFTER USE, DO NOT SWALLOW, Disp: 10.2 g, Rfl: 1    cholecalciferol, vitamin D3, 1,000 unit (25 mcg) tablet, Take 1,000 Units by mouth daily., Disp: , Rfl:     fluconazole (DIFLUCAN) 150 mg tablet, 1 po today and repeat in 3 days, Disp: 2 tablet, Rfl: 0    hydrocortisone-iodoquinoL (VYTONE) 1-1 % cream, Apply topically 3 (three) times a day as needed (for itching)., Disp: 45 g, Rfl: 0    levothyroxine (SYNTHROID) 125 mcg tablet, Take 125 mcg by mouth daily before breakfast., Disp: , Rfl:     losartan (COZAAR) 50 mg tablet, Take 50 mg by mouth daily., Disp: , Rfl:     lycopene 10 mg capsule, Take by  mouth., Disp: , Rfl:     montelukast (SINGULAIR) 10 mg tablet, Take 1 tablet (10 mg total) by mouth once daily., Disp: 90 tablet, Rfl: 1    rosuvastatin (CRESTOR) 5 mg tablet, Take 5 mg by mouth daily., Disp: , Rfl:     sertraline (ZOLOFT) 50 mg tablet, Take 1 tablet (50 mg total) by mouth once daily., Disp: 90 tablet, Rfl: 1    albuterol HFA 90 mcg/actuation inhaler, Inhale 2 puffs every 6 (six) hours as needed for wheezing., Disp: 18 g, Rfl: 1    Allergies   Allergen Reactions    Feathers Shortness of breath    Mold Shortness of breath    Shellfish Derived GI intolerance       Social History     Tobacco Use    Smoking status: Former     Packs/day: 2.50     Years: 18.00     Additional pack years: 0.00     Total pack years: 45.00     Types: Cigarettes     Quit date:      Years since quittin.2    Smokeless tobacco: Never   Vaping Use    Vaping Use: Never used   Substance Use Topics    Alcohol use: Not Currently     Alcohol/week: 14.0 standard drinks of alcohol     Types: 14 Standard drinks or equivalent per week     Comment: social    Drug use: No       Health Maintenance   Topic Date Due    Pneumococcal (1 of 2 - PCV) 04/10/2025 (Originally 1971)    HIV Screening  2050 (Originally 1979)    Breast Cancer Screening  2025    Depression Screening  04/10/2025    Colorectal Cancer Screening  2027    DTaP, Tdap, and Td Vaccines (4 - Td or Tdap) 2033    Zoster Vaccine  Completed    Influenza Vaccine  Completed    COVID-19 Vaccine  Completed    Meningococcal ACWY  Aged Out    RSV <20 months  Aged Out    HIB Vaccines  Aged Out    IPV Vaccines  Aged Out    HPV Vaccines  Aged Out    Hepatitis B Vaccines  Discontinued    Hepatitis C Screening  Discontinued       HPI  Possible sinus infection    Sinusitis  This is a new problem. The current episode started 1 to 4 weeks ago. The problem has been gradually worsening since onset. There has been no fever. The pain is moderate. Associated  "symptoms include congestion, ear pain, headaches and sinus pressure. Pertinent negatives include no chills, coughing, diaphoresis, hoarse voice, neck pain, shortness of breath, sneezing, sore throat or swollen glands. Past treatments include saline nose sprays (NSAIDs). The treatment provided no relief.       The following have been reviewed and updated as appropriate in this visit:          Review of System  Review of Systems   Constitutional:  Negative for chills and diaphoresis.   HENT:  Positive for congestion, ear pain and sinus pressure. Negative for hoarse voice, sneezing and sore throat.    Respiratory:  Negative for cough and shortness of breath.    Musculoskeletal:  Negative for neck pain.   Neurological:  Positive for headaches.       Objective     Vitals  Vitals:    04/10/24 1537   BP: 122/72   BP Location: Left upper arm   Patient Position: Sitting   Pulse: 83   Resp: 18   Temp: 36.8 °C (98.2 °F)   TempSrc: Temporal   SpO2: 99%   Weight: 71.5 kg (157 lb 9.6 oz)   Height: 1.651 m (5' 5\")     Body mass index is 26.23 kg/m².    Physical Exam  Physical Exam  Vitals reviewed.   Constitutional:       General: She is not in acute distress.     Appearance: Normal appearance. She is not ill-appearing, toxic-appearing or diaphoretic.   HENT:      Right Ear: Ear canal and external ear normal. Tympanic membrane is bulging.      Left Ear: Ear canal and external ear normal. Tympanic membrane is bulging.      Nose: Mucosal edema and congestion present.      Comments: Purulent nasal discharge     Mouth/Throat:      Mouth: Mucous membranes are moist.      Pharynx: Oropharynx is clear. Posterior oropharyngeal erythema present. No pharyngeal swelling, oropharyngeal exudate or uvula swelling.      Comments: Purulent post nasal drip  Cardiovascular:      Rate and Rhythm: Normal rate and regular rhythm.   Pulmonary:      Effort: Pulmonary effort is normal.      Breath sounds: Normal breath sounds. No wheezing, rhonchi or " rales.   Neurological:      Mental Status: She is alert and oriented to person, place, and time.         Assessment/Plan     Problem List Items Addressed This Visit       Mild asthma     Stable  No exacerbation today.         Acute non-recurrent maxillary sinusitis - Primary     Augmentin BID #20 with meals  Push po fluids  Saline lavage  Ibuprofen PRN  Follow up if symptoms worsen/persist.           Relevant Medications    amoxicillin-pot clavulanate (AUGMENTIN) 875-125 mg per tablet     Other Visit Diagnoses       Vaginal yeast infection        Relevant Medications    fluconazole (DIFLUCAN) 150 mg tablet                [facility]  4/10/2024

## 2024-04-12 ENCOUNTER — TRANSCRIBE ORDERS (OUTPATIENT)
Dept: PULMONOLOGY | Facility: HOSPITAL | Age: 59
End: 2024-04-12

## 2024-04-12 ENCOUNTER — TRANSCRIBE ORDERS (OUTPATIENT)
Dept: SCHEDULING | Age: 59
End: 2024-04-12

## 2024-04-12 DIAGNOSIS — J21.9 ACUTE BRONCHIOLITIS, UNSPECIFIED: Primary | ICD-10-CM

## 2024-04-12 DIAGNOSIS — R91.8 OTHER NONSPECIFIC ABNORMAL FINDING OF LUNG FIELD: ICD-10-CM

## 2024-05-14 NOTE — TELEPHONE ENCOUNTER
5/4-5/6. Restarted 5/6.     Plan:  Follow on weekly growth labs on Thursdays-->next due 5/16  Per endo, 5/4 weaned CaCarb to 30 (60) and discontinued NaPhos (25)--> now every 8 hour dosing to coincide with his every 4 hour care.  Continue KCl 4 --> now every 8 hour dosing to coincide with his every 4 hour care  Continue NaCl  4 --> now every 8 hour dosing to coincide with his every 4 hour care   Called patient and emailed lab slip. Pelvic ultrasound results are in from 10/2019

## 2024-05-20 NOTE — TELEPHONE ENCOUNTER
Last OV was 9/2018
How Severe Is Your Rash?: moderate
Is This A New Presentation, Or A Follow-Up?: Rash

## 2024-05-30 ENCOUNTER — HOSPITAL ENCOUNTER (OUTPATIENT)
Dept: RADIOLOGY | Facility: HOSPITAL | Age: 59
Discharge: HOME | End: 2024-05-30
Attending: INTERNAL MEDICINE
Payer: COMMERCIAL

## 2024-05-30 DIAGNOSIS — R91.8 OTHER NONSPECIFIC ABNORMAL FINDING OF LUNG FIELD: ICD-10-CM

## 2024-05-30 DIAGNOSIS — J21.9 ACUTE BRONCHIOLITIS, UNSPECIFIED: ICD-10-CM

## 2024-05-30 PROCEDURE — 71250 CT THORAX DX C-: CPT

## 2024-06-09 DIAGNOSIS — J45.909 MILD ASTHMA, UNSPECIFIED WHETHER COMPLICATED, UNSPECIFIED WHETHER PERSISTENT: ICD-10-CM

## 2024-06-10 RX ORDER — MONTELUKAST SODIUM 10 MG/1
10 TABLET ORAL DAILY
Qty: 90 TABLET | Refills: 1 | Status: SHIPPED | OUTPATIENT
Start: 2024-06-10 | End: 2024-12-30

## 2024-06-10 NOTE — TELEPHONE ENCOUNTER
Medicine Refill Request    Last Office Visit: 4/10/2024   Last Consult Visit: Visit date not found  Last Telemedicine Visit: 11/5/2021 Erma Guillen MD    Next Appointment: 7/10/2024      Current Outpatient Medications:     albuterol HFA 90 mcg/actuation inhaler, Inhale 2 puffs every 6 (six) hours as needed for wheezing., Disp: 18 g, Rfl: 1    ALPRAZolam (XANAX) 0.5 mg tablet, Take 1 tablet (0.5 mg total) by mouth once as needed for anxiety. Take 1 tablet one hour prior to flight, Disp: 8 tablet, Rfl: 0    budesonide-formoteroL (SYMBICORT) 80-4.5 mcg/actuation inhaler, INHALE 2 PUFFS INTO THE LUNGS TWICE A DAY RINSE MOUTH WITH WATER AFTER USE, DO NOT SWALLOW, Disp: 10.2 g, Rfl: 1    cholecalciferol, vitamin D3, 1,000 unit (25 mcg) tablet, Take 1,000 Units by mouth daily., Disp: , Rfl:     fluconazole (DIFLUCAN) 150 mg tablet, 1 po today and repeat in 3 days, Disp: 2 tablet, Rfl: 0    hydrocortisone-iodoquinoL (VYTONE) 1-1 % cream, Apply topically 3 (three) times a day as needed (for itching)., Disp: 45 g, Rfl: 0    levothyroxine (SYNTHROID) 125 mcg tablet, Take 125 mcg by mouth daily before breakfast., Disp: , Rfl:     losartan (COZAAR) 50 mg tablet, Take 50 mg by mouth daily., Disp: , Rfl:     lycopene 10 mg capsule, Take by mouth., Disp: , Rfl:     montelukast (SINGULAIR) 10 mg tablet, Take 1 tablet (10 mg total) by mouth once daily., Disp: 90 tablet, Rfl: 1    rosuvastatin (CRESTOR) 5 mg tablet, Take 5 mg by mouth daily., Disp: , Rfl:     sertraline (ZOLOFT) 50 mg tablet, Take 1 tablet (50 mg total) by mouth once daily., Disp: 90 tablet, Rfl: 1      BP Readings from Last 3 Encounters:   04/10/24 122/72   02/26/24 (!) 164/72   01/30/24 (!) 142/84       Recent Lab results:  Lab Results   Component Value Date    CHOL 289 (H) 10/22/2021   ,   Lab Results   Component Value Date    HDL 88 10/22/2021   ,   Lab Results   Component Value Date    LDLCALC 185 (H) 10/22/2021   ,   Lab Results   Component Value Date    TRIG  96 10/22/2021        Lab Results   Component Value Date    GLUCOSE 111 (H) 10/22/2021   ,   Lab Results   Component Value Date    HGBA1C 5.3 10/22/2021         Lab Results   Component Value Date    CREATININE 0.64 10/22/2021       Lab Results   Component Value Date    TSH 1.230 11/07/2022           Lab Results   Component Value Date    HGBA1C 5.3 10/22/2021

## 2024-06-29 DIAGNOSIS — F41.9 ANXIETY: ICD-10-CM

## 2024-07-01 RX ORDER — SERTRALINE HYDROCHLORIDE 50 MG/1
50 TABLET, FILM COATED ORAL DAILY
Qty: 90 TABLET | Refills: 1 | Status: SHIPPED | OUTPATIENT
Start: 2024-07-01 | End: 2024-12-31

## 2024-07-01 NOTE — TELEPHONE ENCOUNTER
Medicine Refill Request    Last Office Visit: 4/10/2024   Last Consult Visit: Visit date not found  Last Telemedicine Visit: 11/5/2021 Erma Guillen MD    Next Appointment: 7/10/2024      Current Outpatient Medications:     albuterol HFA 90 mcg/actuation inhaler, Inhale 2 puffs every 6 (six) hours as needed for wheezing., Disp: 18 g, Rfl: 1    ALPRAZolam (XANAX) 0.5 mg tablet, Take 1 tablet (0.5 mg total) by mouth once as needed for anxiety. Take 1 tablet one hour prior to flight, Disp: 8 tablet, Rfl: 0    budesonide-formoteroL (SYMBICORT) 80-4.5 mcg/actuation inhaler, INHALE 2 PUFFS INTO THE LUNGS TWICE A DAY RINSE MOUTH WITH WATER AFTER USE, DO NOT SWALLOW, Disp: 10.2 g, Rfl: 1    cholecalciferol, vitamin D3, 1,000 unit (25 mcg) tablet, Take 1,000 Units by mouth daily., Disp: , Rfl:     fluconazole (DIFLUCAN) 150 mg tablet, 1 po today and repeat in 3 days, Disp: 2 tablet, Rfl: 0    hydrocortisone-iodoquinoL (VYTONE) 1-1 % cream, Apply topically 3 (three) times a day as needed (for itching)., Disp: 45 g, Rfl: 0    levothyroxine (SYNTHROID) 125 mcg tablet, Take 125 mcg by mouth daily before breakfast., Disp: , Rfl:     losartan (COZAAR) 50 mg tablet, Take 50 mg by mouth daily., Disp: , Rfl:     lycopene 10 mg capsule, Take by mouth., Disp: , Rfl:     montelukast (SINGULAIR) 10 mg tablet, TAKE 1 TABLET BY MOUTH EVERY DAY, Disp: 90 tablet, Rfl: 1    rosuvastatin (CRESTOR) 5 mg tablet, Take 5 mg by mouth daily., Disp: , Rfl:     sertraline (ZOLOFT) 50 mg tablet, Take 1 tablet (50 mg total) by mouth once daily., Disp: 90 tablet, Rfl: 1      BP Readings from Last 3 Encounters:   04/10/24 122/72   02/26/24 (!) 164/72   01/30/24 (!) 142/84       Recent Lab results:  Lab Results   Component Value Date    CHOL 289 (H) 10/22/2021   ,   Lab Results   Component Value Date    HDL 88 10/22/2021   ,   Lab Results   Component Value Date    LDLCALC 185 (H) 10/22/2021   ,   Lab Results   Component Value Date    TRIG 96 10/22/2021         Lab Results   Component Value Date    GLUCOSE 111 (H) 10/22/2021   ,   Lab Results   Component Value Date    HGBA1C 5.3 10/22/2021         Lab Results   Component Value Date    CREATININE 0.64 10/22/2021       Lab Results   Component Value Date    TSH 1.230 11/07/2022           Lab Results   Component Value Date    HGBA1C 5.3 10/22/2021

## 2024-07-10 ENCOUNTER — OFFICE VISIT (OUTPATIENT)
Dept: PRIMARY CARE | Facility: CLINIC | Age: 59
End: 2024-07-10
Payer: COMMERCIAL

## 2024-07-10 VITALS
WEIGHT: 157.6 LBS | RESPIRATION RATE: 18 BRPM | OXYGEN SATURATION: 99 % | SYSTOLIC BLOOD PRESSURE: 130 MMHG | HEIGHT: 65 IN | HEART RATE: 91 BPM | DIASTOLIC BLOOD PRESSURE: 82 MMHG | BODY MASS INDEX: 26.26 KG/M2 | TEMPERATURE: 97.9 F

## 2024-07-10 DIAGNOSIS — M62.89 PELVIC FLOOR DYSFUNCTION: ICD-10-CM

## 2024-07-10 DIAGNOSIS — N83.202 LEFT OVARIAN CYST: ICD-10-CM

## 2024-07-10 DIAGNOSIS — N39.3 STRESS INCONTINENCE OF URINE: ICD-10-CM

## 2024-07-10 DIAGNOSIS — Z01.419 ENCOUNTER FOR GYNECOLOGICAL EXAMINATION WITHOUT ABNORMAL FINDING: Primary | ICD-10-CM

## 2024-07-10 PROBLEM — J01.00 ACUTE NON-RECURRENT MAXILLARY SINUSITIS: Status: RESOLVED | Noted: 2024-04-10 | Resolved: 2024-07-10

## 2024-07-10 PROBLEM — R91.8 LUNG NODULES: Status: RESOLVED | Noted: 2021-12-22 | Resolved: 2024-07-10

## 2024-07-10 PROCEDURE — S0612 ANNUAL GYNECOLOGICAL EXAMINA: HCPCS | Performed by: NURSE PRACTITIONER

## 2024-07-10 ASSESSMENT — ENCOUNTER SYMPTOMS: CONSTITUTIONAL NEGATIVE: 1

## 2024-07-10 ASSESSMENT — PAIN SCALES - GENERAL: PAINLEVEL: 0-NO PAIN

## 2024-07-10 ASSESSMENT — PATIENT HEALTH QUESTIONNAIRE - PHQ9: SUM OF ALL RESPONSES TO PHQ9 QUESTIONS 1 & 2: 0

## 2024-07-10 NOTE — PROGRESS NOTES
[facility]  Laura OnofreyolandaEMMA  [address] [facility] [address]  [facility] [address]  [phone]  Fax [phone]      7/10/2024    Madonna Tipton is a 59 y.o. female who presents for annual exam.     The patient is sexually active. GYN screening history: last pap: was normal. Domestic violence: no.     History of abnormal Pap smear: no  Family history of uterine or ovarian cancer: no  Regular self breast exam: yes  History of abnormal mammogram: yes - 3 biopsys  Family history of breast cancer: yes - mother  History of abnormal lipids: no    Menstrual History:  OB History          2    Para   1    Term                AB   1    Living   1         SAB        IAB        Ectopic        Multiple        Live Births                    Patient's last menstrual period was 2018.         Past Medical History:   Diagnosis Date    Abnormal Pap smear of cervix     Anxiety     Asthma     Body mass index (BMI) greater than 99th percentile for age in overweight child     Chlamydia     Fibrocystic breast     Fibroid     H/O mammogram 2017    Hypertension     Lipid disorder     Lung nodules     Palpitations     Personal history of urinary disorder     Screening for breast cancer     annual mammogram and breast MRI    Sensation of pressure in bladder area     Thyroid cancer (CMS/HCC)     follicular carcinoma    Urinary retention with incomplete bladder emptying        Past Surgical History:   Procedure Laterality Date    BREAST BIOPSY      x2    COLONOSCOPY  2016    DENTAL SURGERY      HERNIA REPAIR  2016    LAPAROSCOPIC TOTAL HYSTERECTOMY N/A 2018    [facility], soren salpingectomy, cystoscopy    THYROIDECTOMY       Current Outpatient Medications on File Prior to Visit   Medication Sig Dispense Refill    ALPRAZolam (XANAX) 0.5 mg tablet Take 1 tablet (0.5 mg total) by mouth once as needed for anxiety. Take 1 tablet one hour prior to flight 8 tablet 0     budesonide-formoteroL (SYMBICORT) 80-4.5 mcg/actuation inhaler INHALE 2 PUFFS INTO THE LUNGS TWICE A DAY RINSE MOUTH WITH WATER AFTER USE, DO NOT SWALLOW 10.2 g 1    cholecalciferol, vitamin D3, 1,000 unit (25 mcg) tablet Take 1,000 Units by mouth daily.      fluconazole (DIFLUCAN) 150 mg tablet 1 po today and repeat in 3 days 2 tablet 0    hydrocortisone-iodoquinoL (VYTONE) 1-1 % cream Apply topically 3 (three) times a day as needed (for itching). 45 g 0    levothyroxine (SYNTHROID) 125 mcg tablet Take 125 mcg by mouth daily before breakfast.      losartan (COZAAR) 50 mg tablet Take 50 mg by mouth daily.      lycopene 10 mg capsule Take by mouth.      montelukast (SINGULAIR) 10 mg tablet TAKE 1 TABLET BY MOUTH EVERY DAY 90 tablet 1    rosuvastatin (CRESTOR) 5 mg tablet Take 5 mg by mouth daily.      sertraline (ZOLOFT) 50 mg tablet TAKE 1 TABLET BY MOUTH EVERY DAY 90 tablet 1    albuterol HFA 90 mcg/actuation inhaler Inhale 2 puffs every 6 (six) hours as needed for wheezing. 18 g 1     No current facility-administered medications on file prior to visit.     Patient Active Problem List   Diagnosis    At high risk for breast cancer    History of hysterectomy for indication other than cancer    Mild asthma    Mild vitamin D deficiency    Left ovarian cyst    Anxiety    Family history of malignant neoplasm of breast    Encounter for gynecological examination without abnormal finding    Impaired fasting glucose    Hypercholesterolemia    Former smoker    Benign essential HTN    Follicular neoplasm of thyroid    Stress incontinence of urine       Family History   Problem Relation Age of Onset    Colon cancer Maternal Grandfather     Breast cancer Biological Mother     COPD Maternal Grandmother        Social History     Tobacco Use    Smoking status: Former     Packs/day: 2.50     Years: 18.00     Additional pack years: 0.00     Total pack years: 45.00     Types: Cigarettes     Quit date: 1999     Years since quitting:  "25.5    Smokeless tobacco: Never   Vaping Use    Vaping Use: Never used   Substance Use Topics    Alcohol use: Not Currently     Alcohol/week: 14.0 standard drinks of alcohol     Types: 14 Standard drinks or equivalent per week     Comment: social    Drug use: No       The following have been reviewed and updated as appropriate in this visit:         HPI  Routine GYN exam  S/p hysterectomy--has no cervix  Denies any breast, pelvic or vaginal symptoms  Hx L ovarian cyst--last US 2022 was smaller.  Is not having any symptoms        Review Of Systems  Review of Systems   Constitutional: Negative.    Genitourinary: Negative.    Breast: Negative.       Visit Vitals  /82 (BP Location: Left upper arm, Patient Position: Sitting)   Pulse 91   Temp 36.6 °C (97.9 °F) (Temporal)   Resp 18   Ht 1.651 m (5' 5\")   Wt 71.5 kg (157 lb 9.6 oz)   LMP 05/31/2018   SpO2 99%   BMI 26.23 kg/m²       OB/GYN Physical Exam  Physical Exam  Constitutional:       General: She is not in acute distress.     Appearance: Normal appearance. She is not ill-appearing, toxic-appearing or diaphoretic.     Genitourinary:    Vagina, urethral meatus and external female genitalia normal.        Cervix is absent.     Uterus is absent.   Adnexa is: absent.      Right Adnexa: not tender, not full and no mass present.     Left Adnexa: no tenderness, not full and no mass present.  Cardiovascular:      Rate and Rhythm: Normal rate and regular rhythm.      Heart sounds: No murmur heard.     No friction rub. No gallop.   Pulmonary:      Effort: Pulmonary effort is normal.      Breath sounds: Normal breath sounds. No wheezing, rhonchi or rales.   Chest:   Breasts:     Right: Normal.      Left: Normal.   Abdominal:      General: Bowel sounds are normal. There is no distension.      Palpations: Abdomen is soft. There is no mass.      Tenderness: There is no abdominal tenderness. There is no right CVA tenderness or left CVA tenderness.   Musculoskeletal:      " Cervical back: Neck supple. No rigidity or tenderness.      Right lower leg: No edema.      Left lower leg: No edema.   Lymphadenopathy:      Cervical: No cervical adenopathy.      Upper Body:      Right upper body: No axillary adenopathy.      Left upper body: No axillary adenopathy.   Neurological:      Mental Status: She is alert and oriented to person, place, and time.   Vitals reviewed.           Problem List Items Addressed This Visit       Left ovarian cyst    Overview     Asked patient to follow up with pelvic ultrasound in 4 weeks to further monitor left ovarian cyst.   March 2021 - Overa test - Low Risk  Pelvic US - 4.4cm complex cyst 1 cm smaller           Current Assessment & Plan     Check US pelvis         Relevant Orders    US PELVIS TRANSABDOMINAL & TRANSVAGINAL    Encounter for gynecological examination without abnormal finding - Primary    Current Assessment & Plan     No PAP collected  NL exam  US pelvis to check ovarian cyst  Mammogram UTD  Self breast exams monthly.         Stress incontinence of urine    Current Assessment & Plan     Pelvic floor PT  Referral given 12 visits         Relevant Orders    Ambulatory referral to Physical Therapy     Other Visit Diagnoses       Pelvic floor dysfunction        Relevant Orders    Ambulatory referral to Physical Therapy                  EMMA Miranda  7/10/2024

## 2024-07-10 NOTE — ASSESSMENT & PLAN NOTE
No PAP collected  NL exam  US pelvis to check ovarian cyst  Mammogram UTD  Self breast exams monthly.

## 2024-07-24 DIAGNOSIS — D24.2 FIBROADENOMA OF LEFT BREAST: Primary | ICD-10-CM

## 2024-07-31 ENCOUNTER — HOSPITAL ENCOUNTER (OUTPATIENT)
Dept: RADIOLOGY | Facility: HOSPITAL | Age: 59
Discharge: HOME | End: 2024-07-31
Attending: NURSE PRACTITIONER
Payer: COMMERCIAL

## 2024-07-31 DIAGNOSIS — N83.202 LEFT OVARIAN CYST: ICD-10-CM

## 2024-07-31 PROCEDURE — 76856 US EXAM PELVIC COMPLETE: CPT

## 2024-08-21 ENCOUNTER — HOSPITAL ENCOUNTER (OUTPATIENT)
Dept: RADIOLOGY | Age: 59
Discharge: HOME | End: 2024-08-21
Attending: NURSE PRACTITIONER
Payer: COMMERCIAL

## 2024-08-21 DIAGNOSIS — D24.2 FIBROADENOMA OF LEFT BREAST: ICD-10-CM

## 2024-08-21 PROCEDURE — 76642 ULTRASOUND BREAST LIMITED: CPT | Mod: LT

## 2024-09-11 ENCOUNTER — OFFICE VISIT (OUTPATIENT)
Dept: SURGERY | Facility: CLINIC | Age: 59
End: 2024-09-11
Payer: COMMERCIAL

## 2024-09-11 VITALS
HEIGHT: 65 IN | OXYGEN SATURATION: 98 % | SYSTOLIC BLOOD PRESSURE: 110 MMHG | TEMPERATURE: 98.1 F | BODY MASS INDEX: 26.66 KG/M2 | HEART RATE: 72 BPM | WEIGHT: 160 LBS | DIASTOLIC BLOOD PRESSURE: 78 MMHG

## 2024-09-11 DIAGNOSIS — Z12.31 VISIT FOR SCREENING MAMMOGRAM: Primary | ICD-10-CM

## 2024-09-11 PROCEDURE — 3008F BODY MASS INDEX DOCD: CPT | Performed by: NURSE PRACTITIONER

## 2024-09-11 PROCEDURE — 3078F DIAST BP <80 MM HG: CPT | Performed by: NURSE PRACTITIONER

## 2024-09-11 PROCEDURE — 99214 OFFICE O/P EST MOD 30 MIN: CPT | Performed by: NURSE PRACTITIONER

## 2024-09-11 PROCEDURE — 3074F SYST BP LT 130 MM HG: CPT | Performed by: NURSE PRACTITIONER

## 2024-09-11 NOTE — PROGRESS NOTES
[facility]  EMMA Lo  [address] [place] [address]  Phone: [phone]  Fax: [phone]      Patient ID: Madonna Tipton                              : 1965    Visit Date: 2024  Referring Provider: No ref. provider found   PCP: Laura Balderas CRNP  GYN: Patient Care Team:  Kevin Vasquez MD as Consulting Physician (Gynecologic Oncology)    Subjective:  Amber returns to the office today in annual follow-up.  She is seen in high risk clinic secondary to family history of breast cancer in her mother with an elevated risk of 21.4%.  And most recently had a biopsy of a palpable lump in her left breast which ultimately returned as a fibroadenoma.  Today she notes no changes to her self-exam or breast complaints at present.     Oncology History:    Cancer Staging   No matching staging information was found for the patient.         Oncology History   Submucous and subserous leiomyoma of uterus (Resolved)   2018 Initial Diagnosis    Submucous and subserous leiomyoma of uterus               Breast Health:  Age at menarche: 14  Age at first live birth: 29   Age of menopause: 56       Allergies: Feathers, Mold, and Shellfish derived    Current Medications: has a current medication list which includes the following prescription(s): albuterol hfa, alprazolam, budesonide-formoterol, cholecalciferol (vitamin d3), fluconazole, levothyroxine, losartan, lycopene, montelukast, rosuvastatin, sertraline, and hydrocortisone-iodoquinol.    Past Medical History:  has a past medical history of Abnormal Pap smear of cervix, Anxiety, Asthma, Body mass index (BMI) greater than 99th percentile for age in overweight child, Chlamydia, Fibrocystic breast, Fibroid, H/O mammogram (2017), Hypertension, Lipid disorder, Lung nodules, Palpitations, Personal history of urinary disorder, Screening for breast cancer, Sensation of pressure in bladder area, Thyroid cancer (CMS/HCC) (),  "and Urinary retention with incomplete bladder emptying.    She has no past medical history of Colon polyp, lipoma, or Melanoma (CMS/HCC).    Past Surgical History:  has a past surgical history that includes Colonoscopy (2016); Hernia repair (2016); Laparoscopic total hysterectomy (N/A, 2018); Breast biopsy; Dental surgery; and Thyroidectomy.    Social History:   Social History     Tobacco Use    Smoking status: Former     Packs/day: 2.50     Years: 18.00     Additional pack years: 0.00     Total pack years: 45.00     Types: Cigarettes     Quit date:      Years since quittin.7    Smokeless tobacco: Never   Vaping Use    Vaping Use: Never used   Substance Use Topics    Alcohol use: Not Currently     Alcohol/week: 14.0 standard drinks of alcohol     Types: 14 Standard drinks or equivalent per week     Comment: social    Drug use: No       Family History: family history includes Breast cancer in her biological mother; COPD in her maternal grandmother; Colon cancer in her maternal grandfather.        Physical Exam:    Vitals: Visit Vitals  /78   Pulse 72   Temp 36.7 °C (98.1 °F)   Ht 1.651 m (5' 5\")   Wt 72.6 kg (160 lb) Comment: with shoes on   LMP 2018   SpO2 98%   BMI 26.63 kg/m²     Body mass index is 26.63 kg/m².    Physical Exam  Physical Examination performed in the supine and sitting position  BREAST SIZE: Medium  SYMMETRY yes       SKIN - Skin color, texture, turgor normal. No rashes or lesions Skin is without tethering, dimpling, retraction or increased vascularity  AREOLA - normal    NIPPLES -  normal without retraction and without discharge  LYMPH NODES: infra, supra, cervical, parasternal and axillary nodes normal bilaterally  BREAST TISSUE: Right breast normal without discrete lesions. Left Breast the previously biopsied site is again palpable at the 10 o'clock position near the retroareolar space this is unchanged.  The rest of the breast is normal without discrete lesions. "       Breast Imaging: I have personally reviewed the reports and images as follows.  1/30/2024  Narrative & Impression   CLINICAL HISTORY: New palpable left breast lump near the nipple.     COMPARISON: Prior available studies.     TECHNIQUE:  Full field left digital mammography was performed including digital breast  tomosynthesis imaging. Computer assisted detection was utilized during the  interpretation of this examination. Targeted left breast ultrasound was  performed.     BREAST PARENCHYMAL COMPOSITION: The breast tissue is heterogeneously dense,  which may obscure small masses (breast density type C).     COMMENT:  Diagnostic Left Mammogram  No suspicious findings. In the area of the palpable marker, there is a biopsy  clip associated with a subtle asymmetry reflecting a biopsy-proven fibroadenoma.     Diagnostic Left Ultrasound  Targeted ultrasound was performed. In the left breast at the area palpable  abnormality. There is a hypoechoic circumscribed mass measuring 5 x 6 x 6 mm  which corresponds to the previously biopsied mass which measured 6 x 6 x 5 mm on  ultrasound from the biopsy on 12/20/2021. There is been no suspicious interval  change. The patient reports that following the biopsy she felt that the mass was  less palpable and that this area feels more palpable today. Given the history of  benign biopsy of this mass, short-term follow-up with ultrasound is recommended  in 6 months for stability.     --  IMPRESSION: Left breast mass at 10:00 in the retroareolar region which is  favored to represent the previously biopsied benign fibroadenoma on ultrasound  from December 2021 and is stable on imaging. In discussion with the patient, she  reports that this area feels different to her today. Therefore, short-term  follow-up at 6 months with ultrasound can be performed for stability. The  patient also plans to discuss the management with her surgeon and will consider  elective biopsy after that  discussion.     These results and recommendations were discussed with and acknowledged by the  patient and were given to the patient in writing at the time of the examination.     FINAL ASSESSMENT BIRADS CATEGORY 3 - PROBABLY BENIGN, SHORT INTERVAL FOLLOW-UP  IS RECOMMENDED ([id number])  HETEROGENEOUS        Impression:  Increased risk of breast cancer  Family history of breast cancer  Recommendation and Plan:   This is a 59 year old female who presented to the office today in annual follow up .  We reviewed her exam and *indications which would warrant sooner follow-up or diagnostic evaluation.  She was given prescription for screening and will return to the office in 1 year or sooner with changes.  Lastly, she has been reminded to continue with her routine and preventative health care.    All of the questions were answered.  The patient is in agreement with the treatment plan.      No follow-ups on file.        9/11/2024   10:51 AM        EMMA Harris

## 2024-10-14 ENCOUNTER — HOSPITAL ENCOUNTER (OUTPATIENT)
Dept: RADIOLOGY | Age: 59
Discharge: HOME | End: 2024-10-14
Attending: NURSE PRACTITIONER
Payer: COMMERCIAL

## 2024-10-14 DIAGNOSIS — Z80.3 FAMILY HISTORY OF MALIGNANT NEOPLASM OF BREAST: ICD-10-CM

## 2024-10-14 DIAGNOSIS — Z91.89 AT HIGH RISK FOR BREAST CANCER: ICD-10-CM

## 2024-10-14 PROCEDURE — 77063 BREAST TOMOSYNTHESIS BI: CPT

## 2024-11-13 RX ORDER — BUDESONIDE AND FORMOTEROL FUMARATE DIHYDRATE 80; 4.5 UG/1; UG/1
AEROSOL RESPIRATORY (INHALATION)
Qty: 10.2 G | Refills: 1 | Status: SHIPPED | OUTPATIENT
Start: 2024-11-13

## 2024-11-13 NOTE — TELEPHONE ENCOUNTER
Medicine Refill Request    Last Office Visit: 7/10/2024   Last Consult Visit: Visit date not found  Last Telemedicine Visit: 11/5/2021 Erma Guillen MD    Next Appointment: Visit date not found      Current Outpatient Medications:     albuterol HFA 90 mcg/actuation inhaler, Inhale 2 puffs every 6 (six) hours as needed for wheezing., Disp: 18 g, Rfl: 1    ALPRAZolam (XANAX) 0.5 mg tablet, Take 1 tablet (0.5 mg total) by mouth once as needed for anxiety. Take 1 tablet one hour prior to flight, Disp: 8 tablet, Rfl: 0    budesonide-formoteroL (SYMBICORT) 80-4.5 mcg/actuation inhaler, INHALE 2 PUFFS INTO THE LUNGS TWICE A DAY RINSE MOUTH WITH WATER AFTER USE, DO NOT SWALLOW, Disp: 10.2 g, Rfl: 1    cholecalciferol, vitamin D3, 1,000 unit (25 mcg) tablet, Take 1,000 Units by mouth daily., Disp: , Rfl:     fluconazole (DIFLUCAN) 150 mg tablet, 1 po today and repeat in 3 days, Disp: 2 tablet, Rfl: 0    hydrocortisone-iodoquinoL (VYTONE) 1-1 % cream, Apply topically 3 (three) times a day as needed (for itching). (Patient not taking: Reported on 9/11/2024), Disp: 45 g, Rfl: 0    levothyroxine (SYNTHROID) 125 mcg tablet, Take 137 mcg by mouth daily before breakfast., Disp: , Rfl:     losartan (COZAAR) 50 mg tablet, Take 50 mg by mouth daily., Disp: , Rfl:     lycopene 10 mg capsule, Take by mouth., Disp: , Rfl:     montelukast (SINGULAIR) 10 mg tablet, TAKE 1 TABLET BY MOUTH EVERY DAY, Disp: 90 tablet, Rfl: 1    rosuvastatin (CRESTOR) 5 mg tablet, Take 5 mg by mouth daily., Disp: , Rfl:     sertraline (ZOLOFT) 50 mg tablet, TAKE 1 TABLET BY MOUTH EVERY DAY, Disp: 90 tablet, Rfl: 1      BP Readings from Last 3 Encounters:   09/11/24 110/78   07/10/24 130/82   04/10/24 122/72       Recent Lab results:  Lab Results   Component Value Date    CHOL 289 (H) 10/22/2021   ,   Lab Results   Component Value Date    HDL 88 10/22/2021   ,   Lab Results   Component Value Date    LDLCALC 185 (H) 10/22/2021   ,   Lab Results   Component  Value Date    TRIG 96 10/22/2021        Lab Results   Component Value Date    GLUCOSE 111 (H) 10/22/2021   ,   Lab Results   Component Value Date    HGBA1C 5.3 10/22/2021         Lab Results   Component Value Date    CREATININE 0.64 10/22/2021       Lab Results   Component Value Date    TSH 1.230 11/07/2022           Lab Results   Component Value Date    HGBA1C 5.3 10/22/2021

## 2024-12-02 ENCOUNTER — NURSE TRIAGE (OUTPATIENT)
Dept: PRIMARY CARE | Facility: CLINIC | Age: 59
End: 2024-12-02
Payer: COMMERCIAL

## 2024-12-02 NOTE — TELEPHONE ENCOUNTER
Patient transferred to the Primary Care Telephonic Nurse Triage Line with complaints of     HPI:  11/22 had severe headache after exercising - went to ER  BP elevated in ER: 171/89  CT Head negative   Was sent home and advised follow up     Has been monitoring BP at home since   Usually around 135-150/80-90  Takes Losartan 50mg daily - no missed doses     Was out walking/exercising today   Got a bad headache again with exertion  Pain 8/10 - localized to frontal - throbbing   BP at home 173/104 around 230p  No hx of headaches   No neuro issues - denies weakness, numbness, tingling, vision/speech issues   Denies CP, SOB, dizziness, lightheadedness, etc  No blood thinners, Afebrile   Took Motrin before hike for muscle soreness   Advised ok to take tylenol at this time     Disposition:  Go to ED Now (or to Office With PCP Approval) - has f/u on Friday - asking to be seen sooner     Care Advice:  Care Advice Given    Patient/Caregiver understands and will follow care advice?: Yes, plans to follow advice      Given By Given At Evelyn Giles 12/2/2024  3:06 PM Yes           CALL BACK IF:  * You become worse               Reason for Disposition   SEVERE headache, sudden-onset (i.e., reaching maximum intensity within seconds to 1 hour)    Protocols used: Headache-Adult-[place]

## 2024-12-02 NOTE — TELEPHONE ENCOUNTER
Regarding: severe headache  ----- Message from Jeevan MURDOCK sent at 12/2/2024  2:46 PM EST -----  Red Flag: Patient is having severe headaches with elevated bp.

## 2024-12-04 ENCOUNTER — OFFICE VISIT (OUTPATIENT)
Dept: PRIMARY CARE | Facility: CLINIC | Age: 59
End: 2024-12-04
Payer: COMMERCIAL

## 2024-12-04 VITALS
OXYGEN SATURATION: 99 % | WEIGHT: 163 LBS | RESPIRATION RATE: 18 BRPM | HEART RATE: 83 BPM | TEMPERATURE: 98.2 F | HEIGHT: 65 IN | DIASTOLIC BLOOD PRESSURE: 78 MMHG | BODY MASS INDEX: 27.16 KG/M2 | SYSTOLIC BLOOD PRESSURE: 130 MMHG

## 2024-12-04 DIAGNOSIS — I10 BENIGN ESSENTIAL HTN: Primary | ICD-10-CM

## 2024-12-04 DIAGNOSIS — G44.84 PRIMARY EXERTIONAL HEADACHE: ICD-10-CM

## 2024-12-04 PROCEDURE — 99214 OFFICE O/P EST MOD 30 MIN: CPT | Performed by: NURSE PRACTITIONER

## 2024-12-04 PROCEDURE — 3008F BODY MASS INDEX DOCD: CPT | Performed by: NURSE PRACTITIONER

## 2024-12-04 PROCEDURE — 3078F DIAST BP <80 MM HG: CPT | Performed by: NURSE PRACTITIONER

## 2024-12-04 PROCEDURE — 3075F SYST BP GE 130 - 139MM HG: CPT | Performed by: NURSE PRACTITIONER

## 2024-12-04 RX ORDER — LEVOTHYROXINE SODIUM 137 UG/1
137 TABLET ORAL
COMMUNITY
Start: 2024-11-07 | End: 2024-12-04 | Stop reason: SDUPTHER

## 2024-12-04 ASSESSMENT — ENCOUNTER SYMPTOMS
NAUSEA: 1
VOMITING: 1
PALPITATIONS: 0
SHORTNESS OF BREATH: 0
HYPERTENSION: 1
HEADACHES: 1
MIGRAINE HEADACHES: 0
NUMBNESS: 0
BLURRED VISION: 0
TINGLING: 0
DIZZINESS: 0

## 2024-12-04 ASSESSMENT — PATIENT HEALTH QUESTIONNAIRE - PHQ9: SUM OF ALL RESPONSES TO PHQ9 QUESTIONS 1 & 2: 0

## 2024-12-04 ASSESSMENT — PAIN SCALES - GENERAL: PAINLEVEL_OUTOF10: 0-NO PAIN

## 2024-12-04 NOTE — ASSESSMENT & PLAN NOTE
BP came down once pt sat for 10 minutes  Her machine is running high compared to zahra cuff.  Same med for now  May change to stronger ARB if needed.

## 2024-12-04 NOTE — PROGRESS NOTES
[facility]  [address] [place] [address] [address]  [place] [address]  [phone]  Fax [phone]      Patient ID: Madonna Tipton                              : 1965    Visit Date: 2024    Chief Complaint: ER follow up (Was exercising - got worse headache ever /Went to ER /Since last seen in the ER this has happened 2 more times but not as bad /Not passing out)         Patient ID: Madonna Tipton is a 59 y.o. female.    Patient Active Problem List   Diagnosis    At high risk for breast cancer    History of hysterectomy for indication other than cancer    Mild asthma    Mild vitamin D deficiency    Left ovarian cyst    Anxiety    Family history of malignant neoplasm of breast    Encounter for gynecological examination without abnormal finding    Impaired fasting glucose    Hypercholesterolemia    Former smoker    Benign essential HTN    Follicular neoplasm of thyroid    Stress incontinence of urine    Primary exertional headache         Current Outpatient Medications:     ALPRAZolam (XANAX) 0.5 mg tablet, Take 1 tablet (0.5 mg total) by mouth once as needed for anxiety. Take 1 tablet one hour prior to flight, Disp: 8 tablet, Rfl: 0    budesonide-formoteroL (SYMBICORT) 80-4.5 mcg/actuation inhaler, INHALE 2 PUFFS INTO THE LUNGS TWICE A DAY RINSE MOUTH WITH WATER AFTER USE, DO NOT SWALLOW, Disp: 10.2 g, Rfl: 1    cholecalciferol, vitamin D3, 1,000 unit (25 mcg) tablet, Take 1,000 Units by mouth daily., Disp: , Rfl:     fluconazole (DIFLUCAN) 150 mg tablet, 1 po today and repeat in 3 days, Disp: 2 tablet, Rfl: 0    levothyroxine (SYNTHROID) 125 mcg tablet, Take 137 mcg by mouth daily before breakfast., Disp: , Rfl:     losartan (COZAAR) 50 mg tablet, Take 50 mg by mouth daily., Disp: , Rfl:     lycopene 10 mg capsule, Take by mouth., Disp: , Rfl:     montelukast (SINGULAIR) 10 mg tablet, TAKE 1 TABLET BY MOUTH EVERY DAY, Disp: 90 tablet, Rfl: 1     rosuvastatin (CRESTOR) 5 mg tablet, Take 5 mg by mouth daily., Disp: , Rfl:     sertraline (ZOLOFT) 50 mg tablet, TAKE 1 TABLET BY MOUTH EVERY DAY, Disp: 90 tablet, Rfl: 1    albuterol HFA 90 mcg/actuation inhaler, Inhale 2 puffs every 6 (six) hours as needed for wheezing., Disp: 18 g, Rfl: 1    Allergies   Allergen Reactions    Feathers Shortness of breath    Mold Shortness of breath    Shellfish Derived GI intolerance       Social History     Tobacco Use    Smoking status: Former     Current packs/day: 0.00     Average packs/day: 2.5 packs/day for 18.0 years (45.0 ttl pk-yrs)     Types: Cigarettes     Start date:      Quit date:      Years since quittin.9    Smokeless tobacco: Never   Vaping Use    Vaping status: Never Used   Substance Use Topics    Alcohol use: Not Currently     Alcohol/week: 14.0 standard drinks of alcohol     Types: 14 Standard drinks or equivalent per week     Comment: social    Drug use: No       Health Maintenance   Topic Date Due    Pneumococcal (1 of 2 - PCV) 04/10/2025 (Originally 1972)    HIV Screening  2050 (Originally 1979)    Breast Cancer Screening  10/14/2025    Depression Screening  2025    Colorectal Cancer Screening  2027    DTaP, Tdap, and Td Vaccines (4 - Td or Tdap) 2033    RSV Vaccine (1 - 1-dose 75+ series) 2041    Zoster Vaccine  Completed    Influenza Vaccine  Completed    COVID-19 Vaccine  Completed    Meningococcal ACWY  Aged Out    RSV <20 months  Aged Out    HIB Vaccines  Aged Out    IPV Vaccines  Aged Out    HPV Vaccines  Aged Out    Hepatitis B Vaccines  Discontinued    Hepatitis C Screening  Discontinued       HPI  Follow up ER visit last week for headache and elevated BP  Still getting headaches with exercise--not as intense as last week.  BP running high at home at times  Is taking her Losartan  No CP, SOB, no vision change, not really dizzy  Using NSAIDs when she gets symptoms with some relief.    Headache  "  This is a new problem. The current episode started 1 to 4 weeks ago. The pain is located in the Bilateral region. The pain does not radiate. The pain quality is not similar to prior headaches. The quality of the pain is described as dull and throbbing. The pain is at a severity of 10/10. Associated symptoms include nausea and vomiting. Pertinent negatives include no blurred vision, dizziness, numbness or tingling. The symptoms are aggravated by activity. She has tried NSAIDs (Toradol in ER) for the symptoms. The treatment provided significant relief. Her past medical history is significant for hypertension. There is no history of migraine headaches, recent head traumas or sinus disease.   Hypertension  This is a chronic problem. The current episode started more than 1 year ago. The problem is unchanged. Associated symptoms include headaches. Pertinent negatives include no blurred vision, malaise/fatigue, palpitations, peripheral edema or shortness of breath. Agents associated with hypertension include NSAIDs. Past treatments include angiotensin blockers. The current treatment provides moderate improvement. There are no compliance problems.        The following have been reviewed and updated as appropriate in this visit:   Allergies  Meds  Problems         Review of System  Review of Systems   Constitutional:  Negative for malaise/fatigue.   Eyes:  Negative for blurred vision.   Respiratory:  Negative for shortness of breath.    Cardiovascular:  Negative for palpitations.   Gastrointestinal:  Positive for nausea and vomiting.   Neurological:  Positive for headaches. Negative for dizziness, tingling and numbness.       Objective     Vitals  Vitals:    12/04/24 0858 12/04/24 0917   BP: (!) 142/98 130/78   BP Location: Left upper arm    Patient Position: Sitting    Pulse: 83    Resp: 18    Temp: 36.8 °C (98.2 °F)    TempSrc: Temporal    SpO2: 99%    Weight: 73.9 kg (163 lb)    Height: 1.651 m (5' 5\")      Body mass " index is 27.12 kg/m².    Physical Exam  Physical Exam  Vitals reviewed.   Constitutional:       General: She is not in acute distress.     Appearance: Normal appearance. She is not ill-appearing, toxic-appearing or diaphoretic.   HENT:      Right Ear: Tympanic membrane, ear canal and external ear normal.      Left Ear: Tympanic membrane, ear canal and external ear normal.   Eyes:      Extraocular Movements: Extraocular movements intact.      Conjunctiva/sclera: Conjunctivae normal.      Pupils: Pupils are equal, round, and reactive to light.   Cardiovascular:      Rate and Rhythm: Normal rate and regular rhythm.      Heart sounds: No murmur heard.     No friction rub. No gallop.   Pulmonary:      Effort: Pulmonary effort is normal.      Breath sounds: Normal breath sounds. No wheezing, rhonchi or rales.   Musculoskeletal:      Right lower leg: No edema.      Left lower leg: No edema.   Neurological:      Mental Status: She is alert and oriented to person, place, and time.         9 d ago    TSH  0.45 - 5.33 uIU/mL 2.88   pecimen: Serum specimen (specimen)  Component  Ref Range & Units 12 d ago Comments   SODIUM  136 - 145 mmol/L 138    POTASSIUM  3.5 - 5.1 mmol/L 4.1    CHLORIDE  98 - 107 mmol/L 102    CARBON DIOXIDE  21 - 31 mmol/L 26    CREATININE  0.5 - 1.1 mg/dL 0.61    BUN  7 - 18 mg/dL 21 High     GLUCOSE  70 - 99 mg/dL 143 High     LDH, TOTAL  90 - 180 IU/L 163    ALT  7 - 52 IU/L 15    TOTAL BILI  0.3 - 1.0 mg/dL 0.7    URIC ACID  2.3 - 7.6 mg/dL 3.9    CALCIUM  8.4 - 10.6 mg/dL 9.6    PHOSPHORUS  2.5 - 4.5 mg/dL 3.1    ALK PHOS  34 - 104 IU/L 50    TOTAL PROTEIN  6.4 - 8.3 g/dL 7.8    AST  13 - 39 IU/L 17    MAGNESIUM  1.8 - 2.6 mg/dL 2.0    Anion Gap  2 - 11 mmol/L 10.0    HEMOLYSIS NO VISIBLE HEMOLYSIS    eGFR  >60 mL/min/1.73m2 103.6      Component  Ref Range & Units 12 d ago   WBC  3.50 - 10.00 10*3/uL 6.54   RBC  3.70 - 5.20 10*6/uL 4.37   HGB  11.7 - 15.7 g/dL 14.1   HCT  34.0 - 47.0 % 42.6    MCV  81.0 - 100.0 fL 97.5   MCH  27.0 - 33.0 pg 32.3   MCHC  32 - 36 g/dL 33.1   RDW  11.5 - 14.5 % 12.2   PLATELET CNT  150 - 400 10*3/uL 252   NRBC  0 - 0.2 /100 WBCS 0.0   ABS NRBC  0 - 0.012 10*3/uL 0.00   DIFFERENTIAL TYPE AUTOMATED   NEUTROPHIL  40.0 - 75.0 % 52   LYMPHOCYTE  14.0 - 45.0 % 37   MONOCYTE  4.0 - 13.0 % 8   EOSINOPHIL  0.0 - 6.0 % 1   BASOPHIL  0.0 - 3.0 % 1   ABS NEUTROPHIL  1.58 - 7.50 10*3/uL 3.45   ABS LYMPHOCYTE  0.49 - 4.50 10*3/uL 2.39   ABS MONOCYTE  0.15 - 1.30 10*3/uL 0.52   ABS EOSINOPHIL  0.0 - 0.60 10*3/uL 0.08   ABS BASOPHIL  0.0 - 0.30 10*3/uL 0.05   IMMATURE GRANULOCYTES  0 - 2 % 0.8   ABS IMMATURE GRANULOCYTES  0.00 - 0.20 10*3/uL 0.05   Resulting Agency [facility]     HEAD_NECK -- Computed Tomography     Impression        No acute intracranial abnormality.  Narrative    CT HEAD WITHOUT CONTRAST    CLINICAL INFORMATION: 51-year-old with headache.    PROCEDURE: Routine unenhanced brain CT.    COMPARISON: None    FINDINGS:    No acute intracranial hemorrhage, extracerebral fluid collection, midline shift or mass effect.  No CT evidence of major territorial infarct  Ventricles are normal. Cerebral volume is age appropriate.    No focal osseous abnormality.  Visualized paranasal sinuses are clear. Mastoid air cells and middle ear cavities are clear.  Visualized orbits are normal.    XR CHEST 1 VIEW    Anatomical Region Laterality Modality   CHEST -- Digital Radiography     Impression        No active disease in the chest.  Narrative    CLINICAL INFORMATION: Syncope    PROCEDURE: Single AP view of the chest.    COMPARISON: None.    FINDINGS:    Support tubes, lines, devices, surgical material: None.    Lungs and Pleura: Clear lungs. No pleural effusion. No pneumothorax.    Cardiovascular and Mediastinum: The cardiac silhouette is within limits of normal. Mediastinal contour is within limits of normal.    Skeleton, other: No acute osseous abnormalities.    Assessment/Plan      Problem List Items Addressed This Visit       Benign essential HTN - Primary     BP came down once pt sat for 10 minutes  Her machine is running high compared to zahra cuff.  Same med for now  May change to stronger ARB if needed.           Relevant Orders    MRI BRAIN WITH AND WITHOUT CONTRAST    Primary exertional headache     CT was negative  Persistent symptoms  BP is stable today  MRI brain with and without contrast ordered.  Low impact activity only for now  To ER with syncope, vision change, worsening pain.         Relevant Orders    MRI BRAIN WITH AND WITHOUT CONTRAST           [facility] Miranda  12/4/2024

## 2024-12-04 NOTE — ASSESSMENT & PLAN NOTE
CT was negative  Persistent symptoms  BP is stable today  MRI brain with and without contrast ordered.  Low impact activity only for now  To ER with syncope, vision change, worsening pain.

## 2024-12-05 ENCOUNTER — TELEPHONE (OUTPATIENT)
Dept: PRIMARY CARE | Facility: CLINIC | Age: 59
End: 2024-12-05
Payer: COMMERCIAL

## 2024-12-11 ENCOUNTER — HOSPITAL ENCOUNTER (OUTPATIENT)
Dept: RADIOLOGY | Facility: HOSPITAL | Age: 59
Discharge: HOME | End: 2024-12-11
Attending: NURSE PRACTITIONER
Payer: COMMERCIAL

## 2024-12-11 DIAGNOSIS — I10 BENIGN ESSENTIAL HTN: ICD-10-CM

## 2024-12-11 DIAGNOSIS — G44.84 PRIMARY EXERTIONAL HEADACHE: ICD-10-CM

## 2024-12-11 RX ORDER — GADOBUTROL 604.72 MG/ML
7.3 INJECTION INTRAVENOUS ONCE
Status: COMPLETED | OUTPATIENT
Start: 2024-12-11 | End: 2024-12-11

## 2024-12-11 RX ADMIN — GADOBUTROL 7.3 ML: 604.72 INJECTION INTRAVENOUS at 08:21

## 2024-12-28 DIAGNOSIS — J45.909 MILD ASTHMA, UNSPECIFIED WHETHER COMPLICATED, UNSPECIFIED WHETHER PERSISTENT: ICD-10-CM

## 2024-12-28 DIAGNOSIS — F41.9 ANXIETY: ICD-10-CM

## 2024-12-30 RX ORDER — MONTELUKAST SODIUM 10 MG/1
10 TABLET ORAL DAILY
Qty: 30 TABLET | Refills: 5 | Status: SHIPPED | OUTPATIENT
Start: 2024-12-30

## 2024-12-30 NOTE — TELEPHONE ENCOUNTER
Medicine Refill Request    Last Office Visit: 12/4/2024   Last Consult Visit: Visit date not found  Last Telemedicine Visit: 11/5/2021 Erma Guillen MD    Next Appointment: Visit date not found      Current Outpatient Medications:     albuterol HFA 90 mcg/actuation inhaler, Inhale 2 puffs every 6 (six) hours as needed for wheezing., Disp: 18 g, Rfl: 1    ALPRAZolam (XANAX) 0.5 mg tablet, Take 1 tablet (0.5 mg total) by mouth once as needed for anxiety. Take 1 tablet one hour prior to flight, Disp: 8 tablet, Rfl: 0    budesonide-formoteroL (SYMBICORT) 80-4.5 mcg/actuation inhaler, INHALE 2 PUFFS INTO THE LUNGS TWICE A DAY RINSE MOUTH WITH WATER AFTER USE, DO NOT SWALLOW, Disp: 10.2 g, Rfl: 1    cholecalciferol, vitamin D3, 1,000 unit (25 mcg) tablet, Take 1,000 Units by mouth daily., Disp: , Rfl:     fluconazole (DIFLUCAN) 150 mg tablet, 1 po today and repeat in 3 days, Disp: 2 tablet, Rfl: 0    levothyroxine (SYNTHROID) 125 mcg tablet, Take 137 mcg by mouth daily before breakfast., Disp: , Rfl:     losartan (COZAAR) 50 mg tablet, Take 50 mg by mouth daily., Disp: , Rfl:     lycopene 10 mg capsule, Take by mouth., Disp: , Rfl:     montelukast (SINGULAIR) 10 mg tablet, TAKE 1 TABLET BY MOUTH EVERY DAY, Disp: 30 tablet, Rfl: 5    rosuvastatin (CRESTOR) 5 mg tablet, Take 5 mg by mouth daily., Disp: , Rfl:     sertraline (ZOLOFT) 50 mg tablet, TAKE 1 TABLET BY MOUTH EVERY DAY, Disp: 90 tablet, Rfl: 1      BP Readings from Last 3 Encounters:   12/04/24 130/78   09/11/24 110/78   07/10/24 130/82       Recent Lab results:  Lab Results   Component Value Date    CHOL 289 (H) 10/22/2021   ,   Lab Results   Component Value Date    HDL 88 10/22/2021   ,   Lab Results   Component Value Date    LDLCALC 185 (H) 10/22/2021   ,   Lab Results   Component Value Date    TRIG 96 10/22/2021        Lab Results   Component Value Date    GLUCOSE 111 (H) 10/22/2021   ,   Lab Results   Component Value Date    HGBA1C 5.3 10/22/2021         Lab  Results   Component Value Date    CREATININE 0.64 10/22/2021       Lab Results   Component Value Date    TSH 1.230 11/07/2022           Lab Results   Component Value Date    HGBA1C 5.3 10/22/2021

## 2024-12-30 NOTE — TELEPHONE ENCOUNTER
Medicine Refill Request    Last Office Visit: 12/4/2024   Last Consult Visit: Visit date not found  Last Telemedicine Visit: 11/5/2021 Erma Guillen MD    Next Appointment: Visit date not found      Current Outpatient Medications:     albuterol HFA 90 mcg/actuation inhaler, Inhale 2 puffs every 6 (six) hours as needed for wheezing., Disp: 18 g, Rfl: 1    ALPRAZolam (XANAX) 0.5 mg tablet, Take 1 tablet (0.5 mg total) by mouth once as needed for anxiety. Take 1 tablet one hour prior to flight, Disp: 8 tablet, Rfl: 0    budesonide-formoteroL (SYMBICORT) 80-4.5 mcg/actuation inhaler, INHALE 2 PUFFS INTO THE LUNGS TWICE A DAY RINSE MOUTH WITH WATER AFTER USE, DO NOT SWALLOW, Disp: 10.2 g, Rfl: 1    cholecalciferol, vitamin D3, 1,000 unit (25 mcg) tablet, Take 1,000 Units by mouth daily., Disp: , Rfl:     fluconazole (DIFLUCAN) 150 mg tablet, 1 po today and repeat in 3 days, Disp: 2 tablet, Rfl: 0    levothyroxine (SYNTHROID) 125 mcg tablet, Take 137 mcg by mouth daily before breakfast., Disp: , Rfl:     losartan (COZAAR) 50 mg tablet, Take 50 mg by mouth daily., Disp: , Rfl:     lycopene 10 mg capsule, Take by mouth., Disp: , Rfl:     montelukast (SINGULAIR) 10 mg tablet, TAKE 1 TABLET BY MOUTH EVERY DAY, Disp: 90 tablet, Rfl: 1    rosuvastatin (CRESTOR) 5 mg tablet, Take 5 mg by mouth daily., Disp: , Rfl:     sertraline (ZOLOFT) 50 mg tablet, TAKE 1 TABLET BY MOUTH EVERY DAY, Disp: 90 tablet, Rfl: 1      BP Readings from Last 3 Encounters:   12/04/24 130/78   09/11/24 110/78   07/10/24 130/82       Recent Lab results:  Lab Results   Component Value Date    CHOL 289 (H) 10/22/2021   ,   Lab Results   Component Value Date    HDL 88 10/22/2021   ,   Lab Results   Component Value Date    LDLCALC 185 (H) 10/22/2021   ,   Lab Results   Component Value Date    TRIG 96 10/22/2021        Lab Results   Component Value Date    GLUCOSE 111 (H) 10/22/2021   ,   Lab Results   Component Value Date    HGBA1C 5.3 10/22/2021         Lab  Results   Component Value Date    CREATININE 0.64 10/22/2021       Lab Results   Component Value Date    TSH 1.230 11/07/2022           Lab Results   Component Value Date    HGBA1C 5.3 10/22/2021

## 2024-12-31 RX ORDER — SERTRALINE HYDROCHLORIDE 50 MG/1
50 TABLET, FILM COATED ORAL DAILY
Qty: 30 TABLET | Refills: 5 | Status: SHIPPED | OUTPATIENT
Start: 2024-12-31 | End: 2025-01-29 | Stop reason: SDUPTHER

## 2025-01-29 DIAGNOSIS — F41.9 ANXIETY: ICD-10-CM

## 2025-01-29 RX ORDER — SERTRALINE HYDROCHLORIDE 50 MG/1
50 TABLET, FILM COATED ORAL DAILY
Qty: 30 TABLET | Refills: 5 | Status: SHIPPED | OUTPATIENT
Start: 2025-01-29

## 2025-01-29 NOTE — TELEPHONE ENCOUNTER
Medicine Refill Request    Last Office Visit: 12/4/2024   Last Consult Visit: Visit date not found  Last Telemedicine Visit: 11/5/2021 Erma Guillen MD    Next Appointment: Visit date not found      Current Outpatient Medications:     albuterol HFA 90 mcg/actuation inhaler, Inhale 2 puffs every 6 (six) hours as needed for wheezing., Disp: 18 g, Rfl: 1    ALPRAZolam (XANAX) 0.5 mg tablet, Take 1 tablet (0.5 mg total) by mouth once as needed for anxiety. Take 1 tablet one hour prior to flight, Disp: 8 tablet, Rfl: 0    budesonide-formoteroL (SYMBICORT) 80-4.5 mcg/actuation inhaler, INHALE 2 PUFFS INTO THE LUNGS TWICE A DAY RINSE MOUTH WITH WATER AFTER USE, DO NOT SWALLOW, Disp: 10.2 g, Rfl: 1    cholecalciferol, vitamin D3, 1,000 unit (25 mcg) tablet, Take 1,000 Units by mouth daily., Disp: , Rfl:     fluconazole (DIFLUCAN) 150 mg tablet, 1 po today and repeat in 3 days, Disp: 2 tablet, Rfl: 0    levothyroxine (SYNTHROID) 125 mcg tablet, Take 137 mcg by mouth daily before breakfast., Disp: , Rfl:     losartan (COZAAR) 50 mg tablet, Take 50 mg by mouth daily., Disp: , Rfl:     lycopene 10 mg capsule, Take by mouth., Disp: , Rfl:     montelukast (SINGULAIR) 10 mg tablet, TAKE 1 TABLET BY MOUTH EVERY DAY, Disp: 30 tablet, Rfl: 5    rosuvastatin (CRESTOR) 5 mg tablet, Take 5 mg by mouth daily., Disp: , Rfl:     sertraline (ZOLOFT) 50 mg tablet, TAKE 1 TABLET BY MOUTH EVERY DAY, Disp: 30 tablet, Rfl: 5      BP Readings from Last 3 Encounters:   12/04/24 130/78   09/11/24 110/78   07/10/24 130/82       Recent Lab results:  Lab Results   Component Value Date    CHOL 289 (H) 10/22/2021   ,   Lab Results   Component Value Date    HDL 88 10/22/2021   ,   Lab Results   Component Value Date    LDLCALC 185 (H) 10/22/2021   ,   Lab Results   Component Value Date    TRIG 96 10/22/2021        Lab Results   Component Value Date    GLUCOSE 111 (H) 10/22/2021   ,   Lab Results   Component Value Date    HGBA1C 5.3 10/22/2021         Lab  Results   Component Value Date    CREATININE 0.64 10/22/2021       Lab Results   Component Value Date    TSH 1.230 11/07/2022           Lab Results   Component Value Date    HGBA1C 5.3 10/22/2021

## 2025-02-27 ENCOUNTER — OFFICE VISIT (OUTPATIENT)
Dept: NEUROLOGY | Facility: CLINIC | Age: 60
End: 2025-02-27
Payer: COMMERCIAL

## 2025-02-27 VITALS
HEIGHT: 65 IN | HEART RATE: 78 BPM | DIASTOLIC BLOOD PRESSURE: 90 MMHG | SYSTOLIC BLOOD PRESSURE: 150 MMHG | OXYGEN SATURATION: 99 % | WEIGHT: 160 LBS | BODY MASS INDEX: 26.66 KG/M2

## 2025-02-27 DIAGNOSIS — R51.9 WORST HEADACHE OF LIFE: Primary | ICD-10-CM

## 2025-02-27 PROCEDURE — 3008F BODY MASS INDEX DOCD: CPT | Performed by: STUDENT IN AN ORGANIZED HEALTH CARE EDUCATION/TRAINING PROGRAM

## 2025-02-27 PROCEDURE — 3080F DIAST BP >= 90 MM HG: CPT | Performed by: STUDENT IN AN ORGANIZED HEALTH CARE EDUCATION/TRAINING PROGRAM

## 2025-02-27 PROCEDURE — 3077F SYST BP >= 140 MM HG: CPT | Performed by: STUDENT IN AN ORGANIZED HEALTH CARE EDUCATION/TRAINING PROGRAM

## 2025-02-27 PROCEDURE — 99204 OFFICE O/P NEW MOD 45 MIN: CPT | Performed by: STUDENT IN AN ORGANIZED HEALTH CARE EDUCATION/TRAINING PROGRAM

## 2025-02-27 NOTE — PROGRESS NOTES
Neurology Outpatient Encounter  [facility]    Patient Name: Madonna Tipton   Patient : 1965  Patient MRN: [id number]  Date of service: 25     Summary:   2024 exercising with a , felt a sudden headache develop (described as WHOL)  Had nausea.  No focal neurologic deficit.  Went to ED, received CT scan within 4 hours from symptom onset which was negative    Headache returned later that night.  For about 1 wk afterwards would develop similar headache.     No headaches since this event.  Has been exercising since without issue.     MRI brain 2024 w/wo contrast was unremarkable     No fam hx of aneurysm     Assessment & Plan:  Pt presented following a sudden onset WHOL in 2024  CT scan obtained < 4 hrs after HA onset showed no e/o SAH  No aneurysm risk factors  MRI brain negative    Will check CTA for thoroughness     Examination:   Vitals:    25 1421   BP: (!) 150/90   Pulse: 78   SpO2: 99%     Mental status: Wide awake, alert.  Normal comprehension, attention.    Cranial nerves: Pupils equal, reactive.  Full EOM.  Face symmetric.  Speech clear.    Motor: No focal weakness.  Normal tone.  No abnormal movements.    Sensation: Intact to LT throughout.    Reflexes: 2+ biceps, triceps, patellar.    Cerebellar: No dysmetria FNF.  No truncal ataxia.    No orders of the defined types were placed in this encounter.    Past Medical History:   Diagnosis Date    Abnormal Pap smear of cervix     Anxiety     Asthma     Body mass index (BMI) greater than 99th percentile for age in overweight child     Chlamydia     Fibrocystic breast     Fibroid     H/O mammogram 2017    Hypertension     Lipid disorder     Lung nodules     Palpitations     Personal history of urinary disorder     Screening for breast cancer     annual mammogram and breast MRI    Sensation of pressure in bladder area     Thyroid cancer (CMS/HCC)     follicular carcinoma    Urinary retention with incomplete  bladder emptying      Current Outpatient Medications   Medication Sig Dispense Refill    albuterol HFA 90 mcg/actuation inhaler Inhale 2 puffs every 6 (six) hours as needed for wheezing. 18 g 1    ALPRAZolam (XANAX) 0.5 mg tablet Take 1 tablet (0.5 mg total) by mouth once as needed for anxiety. Take 1 tablet one hour prior to flight 8 tablet 0    budesonide-formoteroL (SYMBICORT) 80-4.5 mcg/actuation inhaler INHALE 2 PUFFS INTO THE LUNGS TWICE A DAY RINSE MOUTH WITH WATER AFTER USE, DO NOT SWALLOW 10.2 g 1    cholecalciferol, vitamin D3, 1,000 unit (25 mcg) tablet Take 1,000 Units by mouth daily.      fluconazole (DIFLUCAN) 150 mg tablet 1 po today and repeat in 3 days 2 tablet 0    levothyroxine (SYNTHROID) 125 mcg tablet Take 137 mcg by mouth daily before breakfast.      losartan (COZAAR) 50 mg tablet Take 50 mg by mouth daily.      lycopene 10 mg capsule Take by mouth.      montelukast (SINGULAIR) 10 mg tablet TAKE 1 TABLET BY MOUTH EVERY DAY 30 tablet 5    rosuvastatin (CRESTOR) 5 mg tablet Take 5 mg by mouth daily.      sertraline (ZOLOFT) 50 mg tablet Take 1 tablet (50 mg total) by mouth daily. 30 tablet 5     No current facility-administered medications for this visit.     Noah Johnson MD  General Neurology

## 2025-03-26 ENCOUNTER — HOSPITAL ENCOUNTER (OUTPATIENT)
Dept: RADIOLOGY | Facility: HOSPITAL | Age: 60
Discharge: HOME | End: 2025-03-26
Attending: STUDENT IN AN ORGANIZED HEALTH CARE EDUCATION/TRAINING PROGRAM
Payer: COMMERCIAL

## 2025-03-26 ENCOUNTER — RESULTS FOLLOW-UP (OUTPATIENT)
Dept: NEUROLOGY | Facility: CLINIC | Age: 60
End: 2025-03-26

## 2025-03-26 DIAGNOSIS — R51.9 WORST HEADACHE OF LIFE: ICD-10-CM

## 2025-03-26 PROCEDURE — 63600105 HC IODINE BASED CONTRAST: Mod: JZ | Performed by: STUDENT IN AN ORGANIZED HEALTH CARE EDUCATION/TRAINING PROGRAM

## 2025-03-26 PROCEDURE — 70496 CT ANGIOGRAPHY HEAD: CPT

## 2025-03-26 RX ORDER — IOPAMIDOL 755 MG/ML
100 INJECTION, SOLUTION INTRAVASCULAR
Status: COMPLETED | OUTPATIENT
Start: 2025-03-26 | End: 2025-03-26

## 2025-03-26 RX ADMIN — IOPAMIDOL 100 ML: 755 INJECTION, SOLUTION INTRAVENOUS at 08:11

## 2025-04-08 RX ORDER — BUDESONIDE AND FORMOTEROL FUMARATE DIHYDRATE 80; 4.5 UG/1; UG/1
AEROSOL RESPIRATORY (INHALATION)
Qty: 10.2 G | Refills: 1 | Status: SHIPPED | OUTPATIENT
Start: 2025-04-08

## 2025-04-08 NOTE — TELEPHONE ENCOUNTER
Medicine Refill Request    Last Office Visit: 12/4/2024   Last Consult Visit: Visit date not found  Last Telemedicine Visit: 11/5/2021 Erma Guillen MD    Next Appointment: Visit date not found      Current Outpatient Medications:     albuterol HFA 90 mcg/actuation inhaler, Inhale 2 puffs every 6 (six) hours as needed for wheezing., Disp: 18 g, Rfl: 1    ALPRAZolam (XANAX) 0.5 mg tablet, Take 1 tablet (0.5 mg total) by mouth once as needed for anxiety. Take 1 tablet one hour prior to flight, Disp: 8 tablet, Rfl: 0    budesonide-formoteroL (SYMBICORT) 80-4.5 mcg/actuation inhaler, INHALE 2 PUFFS INTO THE LUNGS TWICE A DAY RINSE MOUTH WITH WATER AFTER USE, DO NOT SWALLOW, Disp: 10.2 g, Rfl: 1    cholecalciferol, vitamin D3, 1,000 unit (25 mcg) tablet, Take 1,000 Units by mouth daily., Disp: , Rfl:     fluconazole (DIFLUCAN) 150 mg tablet, 1 po today and repeat in 3 days, Disp: 2 tablet, Rfl: 0    levothyroxine (SYNTHROID) 125 mcg tablet, Take 137 mcg by mouth daily before breakfast., Disp: , Rfl:     losartan (COZAAR) 50 mg tablet, Take 50 mg by mouth daily., Disp: , Rfl:     lycopene 10 mg capsule, Take by mouth., Disp: , Rfl:     montelukast (SINGULAIR) 10 mg tablet, TAKE 1 TABLET BY MOUTH EVERY DAY, Disp: 30 tablet, Rfl: 5    rosuvastatin (CRESTOR) 5 mg tablet, Take 5 mg by mouth daily., Disp: , Rfl:     sertraline (ZOLOFT) 50 mg tablet, Take 1 tablet (50 mg total) by mouth daily., Disp: 30 tablet, Rfl: 5    BP Readings from Last 3 Encounters:   02/27/25 (!) 150/90   12/04/24 130/78   09/11/24 110/78       Recent Lab results:  Lab Results   Component Value Date    CHOL 289 (H) 10/22/2021   ,   Lab Results   Component Value Date    HDL 88 10/22/2021   ,   Lab Results   Component Value Date    LDLCALC 185 (H) 10/22/2021   ,   Lab Results   Component Value Date    TRIG 96 10/22/2021        Lab Results   Component Value Date    GLUCOSE 111 (H) 10/22/2021   ,   Lab Results   Component Value Date    HGBA1C 5.3  10/22/2021         Lab Results   Component Value Date    CREATININE 0.64 10/22/2021       Lab Results   Component Value Date    TSH 1.230 11/07/2022           Lab Results   Component Value Date    HGBA1C 5.3 10/22/2021

## 2025-04-09 ENCOUNTER — DOCUMENTATION (OUTPATIENT)
Dept: PRIMARY CARE | Facility: CLINIC | Age: 60
End: 2025-04-09
Payer: COMMERCIAL

## 2025-04-09 NOTE — PROGRESS NOTES
Sherry Hughes MA has completed a chart review for Amber Tipton and have determined that the care gap has been satisfied.    Care Gap Source: [facility]    Care Gap(s) Identified: Colorectal Cancer Screening    Chart Review Completed: Yes              Colonoscopy completed on 3/31/17.

## 2025-09-01 DIAGNOSIS — J45.909 MILD ASTHMA, UNSPECIFIED WHETHER COMPLICATED, UNSPECIFIED WHETHER PERSISTENT: ICD-10-CM

## 2025-09-02 RX ORDER — MONTELUKAST SODIUM 10 MG/1
10 TABLET ORAL DAILY
Qty: 30 TABLET | Refills: 5 | Status: SHIPPED | OUTPATIENT
Start: 2025-09-02

## (undated) DEVICE — COVER BACK TABLE REINFORCED

## (undated) DEVICE — BLOWER HAND STERILE LATEX FREE

## (undated) DEVICE — PENCIL ESU HANDSWITCHING W/HOL

## (undated) DEVICE — TUBE SUCTION 1/4INX20FT STERILE

## (undated) DEVICE — GLOVE SZ 8 LINER PROTEXIS PI BL

## (undated) DEVICE — TROCAR 1ST ENTRY 5 X 100MM ADV FIX

## (undated) DEVICE — ADHESIVE SKIN DERMABOND ADVANCED 0.7ML

## (undated) DEVICE — TUBING INSUFFLATION PNEUMOSURE HEATED HIGH FLOW

## (undated) DEVICE — APPLICATOR CHLORAPREP 26ML ORANGE TINT

## (undated) DEVICE — PAD POSITIONING XL W/ARM PROTECTORS

## (undated) DEVICE — SKIN MARKER SURGICAL

## (undated) DEVICE — ***USE 127961*** SUTURE VICRYL 0 J740D CR CT-1 18IN VIOLET

## (undated) DEVICE — ***USE 113691***PACK DC TLH

## (undated) DEVICE — SEALANT SURGICAL FLOSEAL 5ML STERILE PREP RECOTHERM

## (undated) DEVICE — TROCAR 1ST ENTRY 12 X 100MM ADV FIX

## (undated) DEVICE — BLADE SCALPEL #11

## (undated) DEVICE — SANI-SERVE SLUSH DRAPE SUBSTIT

## (undated) DEVICE — CORD LAPAROSCOPIC DISP STERILE

## (undated) DEVICE — PAD GROUND ELECTROSURGICAL W/CORD

## (undated) DEVICE — SYSTEM UTERINE MANIPULATION RUMI II 3.0CM

## (undated) DEVICE — Device

## (undated) DEVICE — TRAY URINE METER FOLEY SILVER 16 FR 5CC 2 W

## (undated) DEVICE — APPLICATOR FLOSEAL ENDOSCOPIC

## (undated) DEVICE — TROCAR SLEEVE W/ BALLOON 5MM

## (undated) DEVICE — EVACUATOR PLUME-AWAY LAP SMOKE PKG

## (undated) DEVICE — SPONGE LAP DISPOSABLE 18X18

## (undated) DEVICE — ***USE 124736***SYRINGE TOOMEY

## (undated) DEVICE — STRYKEFLOW 2 W/ DISP TIP

## (undated) DEVICE — MANIFOLD SINGLE PORT NEPTUNE

## (undated) DEVICE — TIPS SCISSOR MICROLINE LAP

## (undated) DEVICE — ***USE 69386*** APPLIER ENDO CLIP LIGAMAX 5MM

## (undated) DEVICE — TIP RUMI BLUE 6.7MM X 8CM

## (undated) DEVICE — GLOVE SZ 8 PROTEXIS PI

## (undated) DEVICE — PACK GYN LAPAROSCOPY TLH

## (undated) DEVICE — GOWN SURGICAL REINFORCED X-LAR

## (undated) DEVICE — SEALER/DIVIDER LIGASURE BLUNT TIP LAPAROSCOPIC 5 MM

## (undated) DEVICE — SOLN IRRIG .9%SOD 1000ML

## (undated) DEVICE — GLOVE SURG PROTEXIS PF 7.0 2D72NS70X

## (undated) DEVICE — SYRINGE DISP LUER-LOK 10 CC

## (undated) DEVICE — JELLY LUBRICATING STERILE 3GM PACKET

## (undated) DEVICE — NEEDLE DISP HYPO 25GX1-1/2IN

## (undated) DEVICE — ***USE 138819***HANDPIECE HARMONIC HARH36 LAPAROSCOPIC 36CM

## (undated) DEVICE — APPLICATOR COTTON TIP 18IN

## (undated) DEVICE — TUBING CYSTO BLADDER IRRIG

## (undated) DEVICE — *T* HARMONIC HD 1001 SHEARS 36CM

## (undated) DEVICE — ***USE 138525*** SUTURE VICRYL 0 J106T TIES 18IN VIOLET

## (undated) DEVICE — GOWN SURG X-LARGE MICROCOOL